# Patient Record
Sex: MALE | Race: WHITE | Employment: OTHER | URBAN - NONMETROPOLITAN AREA
[De-identification: names, ages, dates, MRNs, and addresses within clinical notes are randomized per-mention and may not be internally consistent; named-entity substitution may affect disease eponyms.]

---

## 2017-04-18 ENCOUNTER — HOSPITAL ENCOUNTER (OUTPATIENT)
Dept: CT IMAGING | Age: 62
Discharge: HOME | End: 2017-04-18
Payer: COMMERCIAL

## 2017-04-18 DIAGNOSIS — C65.9 MALIGNANT NEOPLASM OF RENAL PELVIS, UNSPECIFIED LATERALITY (HCC): ICD-10-CM

## 2017-04-18 LAB
GFR NON-AFRICAN AMERICAN: 44
PERFORMED ON: ABNORMAL
POC CREATININE: 1.6 MG/DL (ref 0.3–1.3)
POC SAMPLE TYPE: ABNORMAL

## 2017-04-18 PROCEDURE — 82565 ASSAY OF CREATININE: CPT

## 2017-04-18 PROCEDURE — 6360000004 HC RX CONTRAST MEDICATION: Performed by: INTERNAL MEDICINE

## 2017-04-18 PROCEDURE — 74177 CT ABD & PELVIS W/CONTRAST: CPT

## 2017-04-18 RX ADMIN — IOVERSOL 50 ML: 741 INJECTION INTRA-ARTERIAL; INTRAVENOUS at 09:45

## 2017-11-29 ENCOUNTER — HOSPITAL ENCOUNTER (OUTPATIENT)
Dept: CT IMAGING | Age: 62
Discharge: HOME | End: 2017-11-29
Payer: COMMERCIAL

## 2017-11-29 DIAGNOSIS — C65.9 MALIGNANT NEOPLASM OF RENAL PELVIS, UNSPECIFIED LATERALITY (HCC): ICD-10-CM

## 2017-11-29 LAB
GFR NON-AFRICAN AMERICAN: 41
PERFORMED ON: ABNORMAL
POC CREATININE: 1.7 MG/DL (ref 0.3–1.3)
POC SAMPLE TYPE: ABNORMAL

## 2017-11-29 PROCEDURE — 71260 CT THORAX DX C+: CPT

## 2017-11-29 PROCEDURE — 6360000004 HC RX CONTRAST MEDICATION: Performed by: INTERNAL MEDICINE

## 2017-11-29 PROCEDURE — 74177 CT ABD & PELVIS W/CONTRAST: CPT

## 2017-11-29 PROCEDURE — 82565 ASSAY OF CREATININE: CPT

## 2017-11-29 RX ADMIN — IOPAMIDOL 50 ML: 755 INJECTION, SOLUTION INTRAVENOUS at 08:24

## 2018-11-26 ENCOUNTER — HOSPITAL ENCOUNTER (OUTPATIENT)
Dept: CT IMAGING | Age: 63
Discharge: HOME | End: 2018-11-26
Payer: MEDICARE

## 2018-11-26 DIAGNOSIS — C65.1 MALIGNANT NEOPLASM OF RIGHT RENAL PELVIS (HCC): ICD-10-CM

## 2018-11-26 LAB
GFR NON-AFRICAN AMERICAN: 47
PERFORMED ON: ABNORMAL
POC CREATININE: 1.5 MG/DL (ref 0.3–1.3)
POC SAMPLE TYPE: ABNORMAL

## 2018-11-26 PROCEDURE — 71260 CT THORAX DX C+: CPT

## 2018-11-26 PROCEDURE — 82565 ASSAY OF CREATININE: CPT

## 2018-11-26 PROCEDURE — 74177 CT ABD & PELVIS W/CONTRAST: CPT

## 2018-11-26 PROCEDURE — 6360000004 HC RX CONTRAST MEDICATION: Performed by: INTERNAL MEDICINE

## 2018-11-26 RX ADMIN — IOPAMIDOL 50 ML: 755 INJECTION, SOLUTION INTRAVENOUS at 09:39

## 2019-05-07 ENCOUNTER — HOSPITAL ENCOUNTER (OUTPATIENT)
Dept: CT IMAGING | Age: 64
Discharge: HOME | End: 2019-05-07
Payer: MEDICARE

## 2019-05-07 DIAGNOSIS — C65.1 MALIGNANT NEOPLASM OF RIGHT RENAL PELVIS (HCC): ICD-10-CM

## 2019-05-07 PROCEDURE — 82565 ASSAY OF CREATININE: CPT

## 2019-05-07 PROCEDURE — 71260 CT THORAX DX C+: CPT

## 2019-05-07 PROCEDURE — 6360000004 HC RX CONTRAST MEDICATION: Performed by: INTERNAL MEDICINE

## 2019-05-07 PROCEDURE — 74177 CT ABD & PELVIS W/CONTRAST: CPT

## 2019-05-07 RX ADMIN — IOPAMIDOL 60 ML: 755 INJECTION, SOLUTION INTRAVENOUS at 09:44

## 2019-08-02 ENCOUNTER — HOSPITAL ENCOUNTER (OUTPATIENT)
Dept: INFUSION THERAPY | Age: 64
Discharge: HOME | End: 2019-08-02
Payer: MEDICARE

## 2019-08-20 ENCOUNTER — HOSPITAL ENCOUNTER (OUTPATIENT)
Dept: VASCULAR LAB | Age: 64
Discharge: HOME | End: 2019-08-20
Payer: MEDICARE

## 2019-08-20 DIAGNOSIS — I65.23 BILATERAL CAROTID ARTERY STENOSIS: Primary | ICD-10-CM

## 2019-08-20 PROCEDURE — 93880 EXTRACRANIAL BILAT STUDY: CPT

## 2019-11-11 ENCOUNTER — OFFICE VISIT (OUTPATIENT)
Dept: HEMATOLOGY | Age: 64
End: 2019-11-11
Payer: MEDICARE

## 2019-11-11 ENCOUNTER — HOSPITAL ENCOUNTER (OUTPATIENT)
Dept: INFUSION THERAPY | Age: 64
Discharge: HOME | End: 2019-11-11
Payer: MEDICARE

## 2019-11-11 VITALS
WEIGHT: 192.7 LBS | HEART RATE: 74 BPM | HEIGHT: 67 IN | SYSTOLIC BLOOD PRESSURE: 150 MMHG | BODY MASS INDEX: 30.25 KG/M2 | OXYGEN SATURATION: 96 % | DIASTOLIC BLOOD PRESSURE: 88 MMHG

## 2019-11-11 DIAGNOSIS — I87.8 POOR VENOUS ACCESS: Primary | ICD-10-CM

## 2019-11-11 DIAGNOSIS — C68.9 UROTHELIAL CANCER (HCC): ICD-10-CM

## 2019-11-11 DIAGNOSIS — C68.9 UROTHELIAL CANCER (HCC): Primary | ICD-10-CM

## 2019-11-11 DIAGNOSIS — Z08 ENCOUNTER FOR FOLLOW-UP SURVEILLANCE OF UROTHELIAL CARCINOMA OF UPPER URINARY TRACT: ICD-10-CM

## 2019-11-11 DIAGNOSIS — Z85.50 ENCOUNTER FOR FOLLOW-UP SURVEILLANCE OF UROTHELIAL CARCINOMA OF UPPER URINARY TRACT: ICD-10-CM

## 2019-11-11 PROCEDURE — G8599 NO ASA/ANTIPLAT THER USE RNG: HCPCS | Performed by: INTERNAL MEDICINE

## 2019-11-11 PROCEDURE — 4004F PT TOBACCO SCREEN RCVD TLK: CPT | Performed by: INTERNAL MEDICINE

## 2019-11-11 PROCEDURE — 85025 COMPLETE CBC W/AUTO DIFF WBC: CPT

## 2019-11-11 PROCEDURE — 4040F PNEUMOC VAC/ADMIN/RCVD: CPT | Performed by: INTERNAL MEDICINE

## 2019-11-11 PROCEDURE — 1123F ACP DISCUSS/DSCN MKR DOCD: CPT | Performed by: INTERNAL MEDICINE

## 2019-11-11 PROCEDURE — G8427 DOCREV CUR MEDS BY ELIG CLIN: HCPCS | Performed by: INTERNAL MEDICINE

## 2019-11-11 PROCEDURE — 2580000003 HC RX 258: Performed by: NURSE PRACTITIONER

## 2019-11-11 PROCEDURE — G8417 CALC BMI ABV UP PARAM F/U: HCPCS | Performed by: INTERNAL MEDICINE

## 2019-11-11 PROCEDURE — G8484 FLU IMMUNIZE NO ADMIN: HCPCS | Performed by: INTERNAL MEDICINE

## 2019-11-11 PROCEDURE — 6360000002 HC RX W HCPCS: Performed by: NURSE PRACTITIONER

## 2019-11-11 PROCEDURE — 3017F COLORECTAL CA SCREEN DOC REV: CPT | Performed by: INTERNAL MEDICINE

## 2019-11-11 PROCEDURE — 99213 OFFICE O/P EST LOW 20 MIN: CPT | Performed by: INTERNAL MEDICINE

## 2019-11-11 PROCEDURE — 96523 IRRIG DRUG DELIVERY DEVICE: CPT

## 2019-11-11 RX ORDER — HEPARIN SODIUM (PORCINE) LOCK FLUSH IV SOLN 100 UNIT/ML 100 UNIT/ML
500 SOLUTION INTRAVENOUS PRN
Status: DISCONTINUED | OUTPATIENT
Start: 2019-11-11 | End: 2019-11-12 | Stop reason: HOSPADM

## 2019-11-11 RX ORDER — HEPARIN SODIUM (PORCINE) LOCK FLUSH IV SOLN 100 UNIT/ML 100 UNIT/ML
500 SOLUTION INTRAVENOUS PRN
Status: CANCELLED | OUTPATIENT
Start: 2019-11-11

## 2019-11-11 RX ORDER — SODIUM CHLORIDE 0.9 % (FLUSH) 0.9 %
10 SYRINGE (ML) INJECTION PRN
Status: CANCELLED | OUTPATIENT
Start: 2019-11-11

## 2019-11-11 RX ORDER — SODIUM CHLORIDE 0.9 % (FLUSH) 0.9 %
20 SYRINGE (ML) INJECTION PRN
Status: DISCONTINUED | OUTPATIENT
Start: 2019-11-11 | End: 2019-11-12 | Stop reason: HOSPADM

## 2019-11-11 RX ORDER — ANTIOX #8/OM3/DHA/EPA/LUT/ZEAX 250-2.5 MG
CAPSULE ORAL 2 TIMES DAILY
COMMUNITY

## 2019-11-11 RX ORDER — SODIUM CHLORIDE 0.9 % (FLUSH) 0.9 %
20 SYRINGE (ML) INJECTION PRN
Status: CANCELLED | OUTPATIENT
Start: 2019-11-11

## 2019-11-11 RX ADMIN — Medication 20 ML: at 11:17

## 2019-11-11 RX ADMIN — Medication 500 UNITS: at 11:17

## 2020-02-04 VITALS
DIASTOLIC BLOOD PRESSURE: 80 MMHG | HEIGHT: 68 IN | BODY MASS INDEX: 28.95 KG/M2 | WEIGHT: 191 LBS | SYSTOLIC BLOOD PRESSURE: 134 MMHG | HEART RATE: 78 BPM

## 2020-02-04 RX ORDER — LIDOCAINE AND PRILOCAINE 25; 25 MG/G; MG/G
CREAM TOPICAL PRN
COMMUNITY
End: 2020-06-26 | Stop reason: SDUPTHER

## 2020-02-06 ENCOUNTER — HOSPITAL ENCOUNTER (OUTPATIENT)
Dept: INFUSION THERAPY | Age: 65
Discharge: HOME | End: 2020-02-06
Payer: MEDICARE

## 2020-02-06 DIAGNOSIS — I87.8 POOR VENOUS ACCESS: Primary | ICD-10-CM

## 2020-02-06 PROCEDURE — 6360000002 HC RX W HCPCS: Performed by: INTERNAL MEDICINE

## 2020-02-06 PROCEDURE — 2580000003 HC RX 258: Performed by: NURSE PRACTITIONER

## 2020-02-06 PROCEDURE — 96523 IRRIG DRUG DELIVERY DEVICE: CPT

## 2020-02-06 RX ORDER — SODIUM CHLORIDE 0.9 % (FLUSH) 0.9 %
20 SYRINGE (ML) INJECTION PRN
Status: CANCELLED | OUTPATIENT
Start: 2020-02-06

## 2020-02-06 RX ORDER — SODIUM CHLORIDE 0.9 % (FLUSH) 0.9 %
10 SYRINGE (ML) INJECTION PRN
Status: CANCELLED | OUTPATIENT
Start: 2020-02-06

## 2020-02-06 RX ORDER — SODIUM CHLORIDE 0.9 % (FLUSH) 0.9 %
20 SYRINGE (ML) INJECTION PRN
Status: DISCONTINUED | OUTPATIENT
Start: 2020-02-06 | End: 2020-02-07 | Stop reason: HOSPADM

## 2020-02-06 RX ORDER — HEPARIN SODIUM (PORCINE) LOCK FLUSH IV SOLN 100 UNIT/ML 100 UNIT/ML
300 SOLUTION INTRAVENOUS PRN
Status: DISCONTINUED | OUTPATIENT
Start: 2020-02-06 | End: 2020-02-07 | Stop reason: HOSPADM

## 2020-02-06 RX ORDER — HEPARIN SODIUM (PORCINE) LOCK FLUSH IV SOLN 100 UNIT/ML 100 UNIT/ML
500 SOLUTION INTRAVENOUS PRN
Status: CANCELLED | OUTPATIENT
Start: 2020-02-06

## 2020-02-06 RX ADMIN — HEPARIN 300 UNITS: 100 SYRINGE at 13:40

## 2020-02-06 RX ADMIN — SODIUM CHLORIDE, PRESERVATIVE FREE 20 ML: 5 INJECTION INTRAVENOUS at 13:40

## 2020-03-30 ENCOUNTER — HOSPITAL ENCOUNTER (OUTPATIENT)
Dept: CT IMAGING | Age: 65
Discharge: HOME | End: 2020-03-30
Payer: MEDICARE

## 2020-03-30 LAB
GFR NON-AFRICAN AMERICAN: 38
PERFORMED ON: ABNORMAL
POC CREATININE: 1.8 MG/DL (ref 0.3–1.3)
POC SAMPLE TYPE: ABNORMAL

## 2020-03-30 PROCEDURE — 74177 CT ABD & PELVIS W/CONTRAST: CPT

## 2020-03-30 PROCEDURE — 6360000004 HC RX CONTRAST MEDICATION: Performed by: INTERNAL MEDICINE

## 2020-03-30 PROCEDURE — 82565 ASSAY OF CREATININE: CPT

## 2020-03-30 PROCEDURE — 71260 CT THORAX DX C+: CPT

## 2020-03-30 RX ADMIN — IOPAMIDOL 75 ML: 755 INJECTION, SOLUTION INTRAVENOUS at 08:36

## 2020-04-03 ENCOUNTER — HOSPITAL ENCOUNTER (OUTPATIENT)
Dept: NUCLEAR MEDICINE | Age: 65
Discharge: HOME | End: 2020-04-05
Payer: MEDICARE

## 2020-04-03 LAB
GLUCOSE BLD-MCNC: 130 MG/DL (ref 70–99)
PERFORMED ON: ABNORMAL

## 2020-04-03 PROCEDURE — 82947 ASSAY GLUCOSE BLOOD QUANT: CPT

## 2020-04-03 PROCEDURE — 3430000000 HC RX DIAGNOSTIC RADIOPHARMACEUTICAL: Performed by: INTERNAL MEDICINE

## 2020-04-03 PROCEDURE — 78815 PET IMAGE W/CT SKULL-THIGH: CPT

## 2020-04-03 PROCEDURE — A9552 F18 FDG: HCPCS | Performed by: INTERNAL MEDICINE

## 2020-04-03 RX ORDER — FLUDEOXYGLUCOSE F 18 200 MCI/ML
10 INJECTION, SOLUTION INTRAVENOUS
Status: COMPLETED | OUTPATIENT
Start: 2020-04-03 | End: 2020-04-03

## 2020-04-03 RX ADMIN — FLUDEOXYGLUCOSE F 18 10 MILLICURIE: 200 INJECTION, SOLUTION INTRAVENOUS at 09:41

## 2020-04-03 NOTE — PROGRESS NOTES
Giuliano Duque   1955 4/6/2020     Chief Complaint   Patient presents with    Follow-up     Urothelial cancer        INTERVAL HISTORY/HISTORY OF PRESENT ILLNESS:  Diagnosis   High-grade urothelial carcinoma of the left kidney stage IV, 2010. Recurrence July 2013   Second recurrence October 2015  Treatment summary  1/5/2010-right radical nephrectomy   Relapse February 2013-retroperitoneal soft tissue mass. Gemcitabine/cisplatin completed July 2013 with good partial response, followed by RT 5040 cGy retroperitoneal residual disease, completed October 2013 October 2015- chest wall recurrence, treated with RT   Currently RADHIKA    Interval history  Mr Lexa Guerrero is a pleasant 75years old male with a history of urothelial carcinoma of the right renal pelvis. He was found to have an abnormal surveillance CT scan of the abdomen and therefore a PET scan was requested. The patient denies any abdominal symptoms. He denies any weight loss. He has a good appetite. He is here to discuss the results of his scans and further treatment decision. He has quit smoking more than 1 year ago. Weight has been stable. He denies any hematuria. Cancer history-High-grade Urothelial carcinoma left renal pelvis  Mr Lexa Guerrero was seen in initial oncology consultation on 2/2/2017 as a referral by Dr. Catalina Finnegan office to establish continuity of care of his history of urothelial carcinoma. 7/1/20090119-[id number] right renal cyst measuring 2.9 x 3.4 x 2.8 cm.   12/4/20092713-[id number] biopsy of a right kidney mass was consistent with poorly differentiated adenocarcinoma. 2/24/2010-the patient was evaluated by Dr. Aster Gamboa for a right renal tumor. 1/5/2010- He underwent a right radical nephrectomy with the findings of a poorly differentiated adenocarcinoma involving the mid pole of the right kidney measures 6.5 cm in greatest dimension.  The tumor extended beyond the renal capsule into the perinephric tissues, but did not extend beyond a course of RT to the retroperitoneal area receiving a total dose of 5040 cGy to the periaortic aortic lymph nodes. 11/18/2013-a CT scan of the abdomen pelvis showed slight diminishment in size in the in nodularity within the retrocaval region. 11/20/2015- He was again seen at [facility] and again resection was not recommended. 10/19/2015-a CT scan of the chest/abdomen pelvis showed new area of soft tissue abnormality in the posterior right chest wall. It measured 2.3 cm and was just medial to the right 11th rib. Another 2.7 cm density anterior to the right 12th rib suggest metastatic disease. 11/24/2015- the patient had a biopsy-proven recurrence on the right chest wall compatible with metastatic carcinoma with glandular differentiation, high-grade. Tissue was sent to [facility] and consistent with metastatic poorly differentiated renal cell carcinoma. Treated with radiation therapy only   12/16/2015- abnormal metabolic activity noted between right 11th rib as described on CT scan for October. Consistent metastatic disease.  -------------- Clinical/radiological remission April 2016 --------------  4/7/2016-patient had CT scans of the chest/abdomen/pelvis which did not show any evidence of new disease compatible with complete clinical remission. 6/8/2016- he was last seen by Dr. Tobin Castañeda on this date who planned for surveillance scans in October 2016.   10/07/2016- CT scan of the chest/abdomen and pelvis did not show any evidence of metastatic disease, compatible with ongoing complete clinical remission   2/2/2017- PET/CT scan requested and Insurance denied. 3/16/2017- PET/CT scan requested but declined again. 4/18/2017 CT scan of the abdomen and pelvis-showed a stable 1.6 cm retroperitoneal adenopathy. No new evidence of metastatic disease within the abdomen pelvis.    11/29/2017-CT chest/abdomen/pelvis showed no evidence of metastatic disease, compatible with ongoing complete [place] who requested imaging to be reviewed. CKD stage III-Rickey also has chronic kidney disease stage III. He continues to deny any urinary symptoms to include hematuria. His creatinine is stable at 1.8/GFR 38  Smoke cessation-He has quit and was appraised of his achievement    Plan  Mail CT /PET scan to [facility] today    I have seen, examined and reviewed this patient medication list, appropriate labs and imaging studies. I reviewed relevant medical records and others physicians notes. I discussed the plans of care with the patient. I answered all the questions to the patients satisfaction  (Please note that portions of this note were completed with a voice recognition program. Efforts were made to edit the dictations but occasionally words are mis-transcribed.)  Please note that portions of this note may have been completed with a voice recognition program. Efforts were made to edit the dictations, but occasionally words are missed transcribed. ?      Follow Up:     Return in about 8 weeks (around 6/1/2020) for port flush every 8 weeks, we will call with next Dr. Adrian grewal. [employer] Astudillo am scribing for Liza Feng MD. Electronically signed by Tram Astudillo on 4/6/2020 at 3:46 PM.   I, Dr Jesse Villagran, personally performed the services described in this documentation as scribed by Tram Astudillo MA in my presence and is both accurate and complete. Over 50% of the total visit time of 25 minutes in face to face encounter with the patient, out of which more than 50% of the time was spent in counseling patient or family and coordination of care. Counseling included but was not limited to time spent reviewing labs, imaging studies/ treatment plan and answering questions.

## 2020-04-06 ENCOUNTER — HOSPITAL ENCOUNTER (OUTPATIENT)
Dept: INFUSION THERAPY | Age: 65
Discharge: HOME | End: 2020-04-06
Payer: MEDICARE

## 2020-04-06 ENCOUNTER — OFFICE VISIT (OUTPATIENT)
Dept: HEMATOLOGY | Age: 65
End: 2020-04-06
Payer: MEDICARE

## 2020-04-06 VITALS
OXYGEN SATURATION: 98 % | SYSTOLIC BLOOD PRESSURE: 142 MMHG | WEIGHT: 189.1 LBS | DIASTOLIC BLOOD PRESSURE: 76 MMHG | BODY MASS INDEX: 29.68 KG/M2 | HEIGHT: 67 IN | HEART RATE: 78 BPM | TEMPERATURE: 98.4 F

## 2020-04-06 DIAGNOSIS — C68.9 UROTHELIAL CANCER (HCC): ICD-10-CM

## 2020-04-06 DIAGNOSIS — I87.8 POOR VENOUS ACCESS: Primary | ICD-10-CM

## 2020-04-06 PROCEDURE — G8427 DOCREV CUR MEDS BY ELIG CLIN: HCPCS | Performed by: INTERNAL MEDICINE

## 2020-04-06 PROCEDURE — G8417 CALC BMI ABV UP PARAM F/U: HCPCS | Performed by: INTERNAL MEDICINE

## 2020-04-06 PROCEDURE — 99214 OFFICE O/P EST MOD 30 MIN: CPT | Performed by: INTERNAL MEDICINE

## 2020-04-06 PROCEDURE — 4004F PT TOBACCO SCREEN RCVD TLK: CPT | Performed by: INTERNAL MEDICINE

## 2020-04-06 PROCEDURE — 85025 COMPLETE CBC W/AUTO DIFF WBC: CPT

## 2020-04-06 PROCEDURE — 4040F PNEUMOC VAC/ADMIN/RCVD: CPT | Performed by: INTERNAL MEDICINE

## 2020-04-06 PROCEDURE — 6360000002 HC RX W HCPCS: Performed by: INTERNAL MEDICINE

## 2020-04-06 PROCEDURE — 3017F COLORECTAL CA SCREEN DOC REV: CPT | Performed by: INTERNAL MEDICINE

## 2020-04-06 PROCEDURE — 96523 IRRIG DRUG DELIVERY DEVICE: CPT

## 2020-04-06 PROCEDURE — 1123F ACP DISCUSS/DSCN MKR DOCD: CPT | Performed by: INTERNAL MEDICINE

## 2020-04-06 PROCEDURE — 2580000003 HC RX 258: Performed by: NURSE PRACTITIONER

## 2020-04-06 RX ORDER — HEPARIN SODIUM (PORCINE) LOCK FLUSH IV SOLN 100 UNIT/ML 100 UNIT/ML
300 SOLUTION INTRAVENOUS PRN
Status: DISCONTINUED | OUTPATIENT
Start: 2020-04-06 | End: 2020-04-07 | Stop reason: HOSPADM

## 2020-04-06 RX ORDER — SODIUM CHLORIDE 0.9 % (FLUSH) 0.9 %
20 SYRINGE (ML) INJECTION PRN
Status: CANCELLED | OUTPATIENT
Start: 2020-04-06

## 2020-04-06 RX ORDER — SODIUM CHLORIDE 0.9 % (FLUSH) 0.9 %
10 SYRINGE (ML) INJECTION PRN
Status: DISCONTINUED | OUTPATIENT
Start: 2020-04-06 | End: 2020-04-07 | Stop reason: HOSPADM

## 2020-04-06 RX ORDER — SODIUM CHLORIDE 0.9 % (FLUSH) 0.9 %
10 SYRINGE (ML) INJECTION PRN
Status: CANCELLED | OUTPATIENT
Start: 2020-04-06

## 2020-04-06 RX ORDER — SODIUM CHLORIDE 0.9 % (FLUSH) 0.9 %
20 SYRINGE (ML) INJECTION PRN
Status: DISCONTINUED | OUTPATIENT
Start: 2020-04-06 | End: 2020-04-07 | Stop reason: HOSPADM

## 2020-04-06 RX ORDER — AMLODIPINE BESYLATE 5 MG/1
5 TABLET ORAL 2 TIMES DAILY
Qty: 30 TABLET | Status: SHIPPED | COMMUNITY
Start: 2020-04-06

## 2020-04-06 RX ORDER — HEPARIN SODIUM (PORCINE) LOCK FLUSH IV SOLN 100 UNIT/ML 100 UNIT/ML
300 SOLUTION INTRAVENOUS PRN
Status: CANCELLED | OUTPATIENT
Start: 2020-04-06

## 2020-04-06 RX ADMIN — HEPARIN 300 UNITS: 100 SYRINGE at 09:18

## 2020-04-06 RX ADMIN — SODIUM CHLORIDE, PRESERVATIVE FREE 20 ML: 5 INJECTION INTRAVENOUS at 09:17

## 2020-04-27 NOTE — PROGRESS NOTES
greatest dimension. The tumor extended beyond the renal capsule into the perinephric tissues, but did not extend beyond Gerota's fascia. Margins of resection were negative. Perineural invasion present. No lymph nodes were found in the specimen. No renal vein invasion. Right ureter negative for malignancy. Right adrenal gland negative for malignancy. No lymph nodes identified. Final pathologic staging [id number] stage III. IHC staining performed at [facility] and [facility] showed malignant cells to express PAX-8, p 504s and focal , with negative CK 7 and p63. The case was reviewed by Dr. Mckenna Brewer [facility], where GATA3 was performed and reported as positive in a significant number of cells. Thus, in his consultation report, Dr Michele Escamilla favored urothelial carcinoma over collecting duct carcinoma. 2/24/2010- he was seen by Dr. Valere Goodell and offer a consultation at [facility] for clinical trial, but declined. He was placed on surveillance follow-up with surveillance imaging. He had several follow-up CT scans performed in April 2010, October 2010, January 2011, July 2011, January 2012, August 2012 that were all unremarkable for disease recurrence. ------------------------------ Relapse February 2013--------------------------  2/7/2013-CT scan of the abdomen pelvis revealed retroperitoneal soft tissue density behind the inferior vena cava (2.8 x 2.7 cm) increased in size from prior CT scan on 7/27/2012 02/19/2013- PET/CT scan showed a mass extending along the pericaval lymph node chain from T12-L1 level with SUV 4. Extensive pericaval adenopathy (SUV 5.4). 3/27/2013-he was seen in consultation at [facility]. The retroperitoneal mass could not be resected. He was recommended chemotherapy.    He completed palliative chemotherapy with cisplatin/gemcitabine completed on 7/26/2013.   8/7/2013-interval improvement consistent with a positive response to pelvis. 11/29/2017-CT chest/abdomen/pelvis showed no evidence of metastatic disease, compatible with ongoing complete clinical remission. 11/26/2018-CT chest, abdomen, pelvis unremarkable for recurrent disease. 3/30/2020-Ct Chest with contrast A stable CT scan of the chest. No evidence of a neoplastic/metastatic disease. Severe atheromatous changes of coronary arteries similar to the previous study. No evidence of mediastinal or intrathoracic adenopathy. 3/30/2020-CT abdomen pelvis with contrast showed 21 x 17 mm aortocaval lymph node adjacent to the right nephrectomy bed. 4/3/2020- Pet scan showed metastatic lymphadenopathy in the right paraspinal level at T12 and in the aortocaval region. Maximum SUV is in the aortocaval lymph node and measures 14.2. 2. Indeterminate precarinal lymph node demonstrating a maximum SUV of 4.2. This can be followed with subsequent exams. 4/24/2020- EGD/[facility]US at [facility] and FNA biopsy consistent with recurrent metastatic urothelial carcinoma. IHC positive for PAX-8, PAULY-3 and AE1/AE3. Insufficient cellularity on cellblock for ancillary molecular studies. 4/29/2020-recommended palliative/definitive RT and immunotherapy with pembrolizumab.         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)     Metastatic disease (HCC)     Retinopathy     Urothelial cancer ([facility] [place] [address] 4/29/2013          PAST SURGICAL HISTORY:  Past Surgical History:   Procedure Laterality Date    COLONOSCOPY  2010    Dr Belle Rojas  12/04/2009    LEG SURGERY      PARTIAL NEPHRECTOMY Right 01/05/2012    VASCULAR SURGERY  04- [facility]    us guided cannulation of right internal jugular vein, right internal imaging studies/ treatment plan and answering questions.

## 2020-04-29 ENCOUNTER — TELEPHONE (OUTPATIENT)
Dept: HEMATOLOGY | Age: 65
End: 2020-04-29

## 2020-04-29 ENCOUNTER — TELEPHONE (OUTPATIENT)
Dept: RADIATION ONCOLOGY | Facility: HOSPITAL | Age: 65
End: 2020-04-29

## 2020-04-29 ENCOUNTER — HOSPITAL ENCOUNTER (OUTPATIENT)
Dept: INFUSION THERAPY | Age: 65
Discharge: HOME | End: 2020-04-29
Payer: MEDICARE

## 2020-04-29 ENCOUNTER — OFFICE VISIT (OUTPATIENT)
Dept: HEMATOLOGY | Age: 65
End: 2020-04-29
Payer: MEDICARE

## 2020-04-29 VITALS
WEIGHT: 182.9 LBS | TEMPERATURE: 98.5 F | SYSTOLIC BLOOD PRESSURE: 144 MMHG | HEART RATE: 71 BPM | OXYGEN SATURATION: 98 % | BODY MASS INDEX: 28.71 KG/M2 | DIASTOLIC BLOOD PRESSURE: 86 MMHG | HEIGHT: 67 IN

## 2020-04-29 PROCEDURE — 99213 OFFICE O/P EST LOW 20 MIN: CPT

## 2020-04-29 PROCEDURE — 4040F PNEUMOC VAC/ADMIN/RCVD: CPT | Performed by: INTERNAL MEDICINE

## 2020-04-29 PROCEDURE — 85025 COMPLETE CBC W/AUTO DIFF WBC: CPT

## 2020-04-29 PROCEDURE — 99214 OFFICE O/P EST MOD 30 MIN: CPT | Performed by: INTERNAL MEDICINE

## 2020-04-29 PROCEDURE — 4004F PT TOBACCO SCREEN RCVD TLK: CPT | Performed by: INTERNAL MEDICINE

## 2020-04-29 PROCEDURE — G8417 CALC BMI ABV UP PARAM F/U: HCPCS | Performed by: INTERNAL MEDICINE

## 2020-04-29 PROCEDURE — 3017F COLORECTAL CA SCREEN DOC REV: CPT | Performed by: INTERNAL MEDICINE

## 2020-04-29 PROCEDURE — 1123F ACP DISCUSS/DSCN MKR DOCD: CPT | Performed by: INTERNAL MEDICINE

## 2020-04-29 PROCEDURE — G8427 DOCREV CUR MEDS BY ELIG CLIN: HCPCS | Performed by: INTERNAL MEDICINE

## 2020-05-01 ENCOUNTER — HOSPITAL ENCOUNTER (OUTPATIENT)
Dept: RADIATION ONCOLOGY | Facility: HOSPITAL | Age: 65
Setting detail: RADIATION/ONCOLOGY SERIES
End: 2020-05-01

## 2020-05-03 PROBLEM — Z92.3 HISTORY OF RADIATION THERAPY: Status: ACTIVE | Noted: 2020-05-03

## 2020-05-03 PROBLEM — F17.200 CURRENT EVERY DAY SMOKER: Status: ACTIVE | Noted: 2020-05-03

## 2020-05-03 NOTE — PROGRESS NOTES
[facility] Bibiana Christensen BSN, PA-C  ________________________________________  [facility]  Department of Radiation Oncology  [address] [place] [id number]  Office:  [phone]  Fax: [phone]    DATE:  05/04/2020  PATIENT: Abebe Moncada  1955                         MEDICAL RECORD #:  [id number]                                                       REASON FOR CONSULTATION:  Abebe Moncada is a very pleasant male with a history of urothelial carcinoma of right kidney metastatic to abdominal nodes and now being referred to our office by Ethel Vega MD to discuss radiotherapy recommendations for adenopathy noted on CT scan. Denies activity change, appetite change, unexpected weight change, fatigue, nausea/vomiting, diarrhea, light-headedness, weakness, and headaches. He follows .     History of Present Illness:  Diagnosed in December 2009 with High-grade urothelial carcinoma of the right kidney. Underwent right radical nephrectomy with right adrenal gland on 01/05/2010, pathology revealed poorly differentiated adenocarcinoma involving mid pole of right kidney, 6.5 cm, tumor extends through the renal capsule into perinephric tissues but does not extend beyond Gerota's fascia, ureteral, vascular and parenchymal margins of surgical resection are negative, perineural invasion present, LVI negative. Right adrenal gland, negative for malignancy.  No lymph nodes identified. Final pathologic staging [id number] stage III.   · Recurrence:  02/07/2013 - CT Abdomen/Pelvis revealed a retroperitoneal soft tissue located posterior to IVC appears to be slightly increased, PET Scan was positive for paracaval lymph nodes present extending from T12-L1 level SUV 4.6-5 representing metastatic disease. Unresectable, recommended chemotherapy. Treated with palliative Cisplatin/Gemzar completed July 2013 with good partial response, followed by  RT 5040 cGy retroperitoneal residual disease, completed October 2013  · Recurrence: October 2015-CT scan of the chest showed 2.3 cm soft tissue abnormality in posterior right chest wall medial to right 11th rib and 2.7 cm density anterior to right 12th rib. Biopsy was positive for metastatic carcinoma with glandular differentiation, consistent with metastatic poorly differentiated renal cell carcinoma. Treated with radiation therapy  · Recurrence : 3/30/2020-CT abdomen pelvis showed 21 x 17 mm aortocaval lymph node adjacent to the right nephrectomy bed. Pet scan revealed metastatic lymphadenopathy in the right paraspinal level at T12 and in the aortocaval region. SUV 14.2, indeterminate precarinal lymph node demonstrating a maximum SUV of 4.2. Underwent EGD/EUS at [facility] on 4/24/2020, biopsy was consistent with recurrent metastatic urothelial carcinoma. IHC positive for PAX-8, HEIDE-3 and AE1/AE3. Follows Dr. Vega who recommends palliative/definitive RT and immunotherapy with pembrolizumab.    07/01/2009 - Bilateral Renal Ultrasound:  • Benign appearing right renal cyst measuring 2.9 x 3.4 x 3.8cm    12/04/2009 - Right kidney FNA and biopsy:  Right kidney biopsy:  • Positive for malignancy.  • Consistent with poorly differentiated adenocarcinoma (see outside [facility] report).  FNA kidney:  • Positive for malignancy.  • Consistent with poorly differentiated adenocarcinoma.    01/05/2010 - Radical nephrectomy specimen, right, with right adrenal gland (frozen section control):    • Poorly differentiated adenocarcinoma involving mid pole of right kidney (6.5 cm in greatest dimension).   • The tumor extends through the renal capsule into perinephric tissues but does not extend beyond Gerota's fascia.    • The ureteral, vascular and parenchymal margins of surgical resection are negative for malignancy.    • No renal vein invasion identified.    • Perineural invasion present.    • Chronic pyelonephritis.     • Arteriole-arteriolar nephrosclerosis.    • Right ureter, negative for malignancy.   • Right adrenal gland, negative for malignancy.    • No lymph nodes identified.    Comment: IHC staining performed at [facility] and [facility] [place] showed malignant cells to express PAX-8, p 504s and focal , with negative CK 7 and p63. The case was reviewed by Dr. Yayo Villasenor [facility], where GATA3 was performed and reported as positive in a significant number of cells. Thus, in his consultation report, Dr Bradley favored urothelial carcinoma over collecting duct carcinoma.    02/24/2010- Appointment with Dr. Cade Olvera:  • Offer a consultation at [place] for clinical trial, but declined.   • He was placed on surveillance follow-up with surveillance imaging.     04/28/2010 - CT Abdomen/Pelvis:  • Findings consistent with right nephrectomy and retroperitoneal lymph node dissection with soft tissue surrounding the surgical slips within right renal fossa - possibly scarring, continued close follow-up recommended.  • Stable low density lesions within the right and left hepatic lobes, possibly metastatic, cysts, or hemangiomas - close f/u recommended  • Diffuse bladder wall thickening of uncertain etiology, enlargement of the prostate suggesting underlying bladder outlet obstruction.      10/27/2010 - CT Abdomen/Pelvis:  • Bladder wall thickening stable  • Vague low attenutation defects in the left and right lobe of the liver, more prominent in right    01/27/2011 - CT Abdomen/Pelvis:  • Unremarkable, initially contrast ordered, patient refused    07/27/2011 - CT Abdomen/Pelvis:  • Multiboluated soft issue region within right nephrectomy bed, posterior to the inferior vena cava, stable over prior examinations, likely representing remnant of the renal vein rather than lymphadenopathy - close f/u recommended  • 2 regions within right hepatic lobe stable, benign process favored  • Bladder  wall thickening, chronic obstruction or inflammation     01/27/2012 - CT Abdomen/Pelvis:  • Density posterior to inferior vena cava essentially unchanged  • Hypodense lesions within right lobe of liver, unchanged - hemangiomas?  • Mild thickening of urinary bladder wall - chronic cystitis or mild bladder outlet obstruction    08/03/2012 - CT Abdomen/Pelvis:  • 2 small vague hypodense lesions in liver are stable - likely benign  • Minimal soft tissue posterior to inferior vena cava remains stable  • Continued mild bladder wall thickening     02/07/2013 - CT Abdomen/Pelvis:  • Retroperitoneal soft tissue located posterior to IVC appears to be slightly increased, difficult to accurately measure bc margins are difficult to differentiated from adjacent soft tissues, PET recommended    02/19/2013 - PET Scan:  • Paracaval lymph nodes present extending from T12-L1 level SUV 4.6-5 representing metastatic disease  • SUV 3 in left hip joint - muscular activity or degenerative change    03/22/2013 - Bone Scan:  • Single focus of suspicious uptake in left ilium at upper SI joint level - appears sclerotic  • No abnormal activity in left hip     03/27/2013 - Consultation at [facility]:  • The retroperitoneal mass could not be resected.   • He was recommended chemotherapy.     04/2013 - 07/26/2013 - Chemotherapy course:  • Palliative Cisplatin/Gemzar    05/02/2013 - Mediport placement    07/02/2013 - CT Chest:  • Negative    07/02/2013 - Abdomen/Pelvis:  • Stable hypodense regions in the liver back to 2010 likely benign, stable retroperitoneal lymphadenopathy, diffuse thickening of urinary bladder wall    08/07/2013 - PET Scan:  • Interval decrease in size and number of pericaval nodes indicating response to therapy    09/23/2013 - 10/30/2013 - Completed radiation therapy:  • Received 5040 cGy in 28 fractions to para-aortic lymph nodes via external beam radiation therapy.    02/04/2014 - CT Chest:  • Old granulomatous  disease, otherwise negative chest.   • No interval change from previous exams.    02/04/2014 - CT Abdomen/Pelvis:  • Overall stable appearance of the abdomen and pelvis from a previous study of 11/18/2013. There are two subtle hypodensities within the liver, as described above, as well as some soft tissue fullness in the retrocrural aspect of the right upper retroperitoneum. No new lesions are present. The patient is status post right nephrectomy.  • Atheromatous calcification of the abdominal aorta and iliac arteries with no evidence of aneurysm formation.  • Mild constipation.     06/25/2014 - CT Abdomen/Pelvis:  • No obvious acute pathology in the abdomen or pelvis. Findings in the region of the right diaphragm and liver appear stable.  • Atherosclerotic changes as described.  • Normal appendix.  • Stable left kidney and ureter.  • Prostate enlargement and calcification.    06/25/2014 - Chest x-ray:  • Infusion catheter insertion.  • No active disease seen.    12/10/2014 - CT Abdomen/Pelvis:  • Prior right nephrectomy.   • No significant interval change in the appearance of the abdomen and pelvis when compared to multiple prior exams.   • There is a stable subcentimeter low density in the posterior aspect of the right lobe of the liver.   • There is a stable soft tissuedensity seen posterior to the mandy of the right hemidiaphragm.    12/10/2014 - Chest x-ray:  • Port catheter present, no change from prior exam.     06/18/2015 - CT Abdomen/Pelvis:  • There has been interval development of a prominent interaortocaval node on image 36 of series 0. Although this remains below the threshold for a pathologic node by CT criteria it is more conspicuous than on the previous study and considering the history of renal cell neoplasm, would warrant follow-up on the subsequent CT. There is again some prominence of a retrocrural soft tissue density but that is unchanged from the previous exam. There is also a stable ill-defined  hypodensity within the posterior segment of the right lobe of the liver.  • The left kidney remains normal in appearance with homogeneous enhancement and no evidence of mass. No evidence of nephrolithiasis or obstructive uropathy.  • Heavy atheromatous calcification of the abdominal aorta and iliac arteries with no evidence of aneurysm.   • Mild to moderate constipation.  • Status post right nephrectomy.  • Small hiatal hernia is present.    06/18/2015 - CT Chest:  • 1 cm right paratracheal lymph node more conspicuous compared to the 2/4/2014 examination. Close imaging follow-up suggested.  • Remote granulomatous disease.  • Otherwise negative CT evaluation of the chest.    10/19/2015 - CT Chest:  • No metastatic disease is seen. A 1 cm right paratracheal lymph node is stable.  • Emphysematous appearance of the lungs.  • Please see the abdomen and pelvis CT report separately    10/19/2015 - CT Abdomen/Pelvis:  • New areas of soft tissue abnormality in the posterior right chest wall. One area measures 2.3 cm just medial to the right eleventh rib on image 24 of series 3. There is a 3.7 cm density anterior to the right twelfth rib on image 30 of series 3 and between the right twelfth rib and liver. These areas could be metastatic.  • A lymph node between the abdominal aorta and inferior vena cava may be minimally larger.  • A vague nodular area in the liver measuring less than 1 cm on image 19 of series 3 is stable. A metastatic lesion is possible but this may represent focal fatty infiltration. There is fatty infiltration of the liver.  • Moderate stool in the colon. Atheromatous disease of the aortoiliac vessels. Degenerative changes of the spine.    11/25/2015 - Fine needle core biopsies, posterior right wall chest mass:   • Metastatic carcinoma with glandular differentiation, high-grade.    Comment:  • The tumor is negative for TTF-1, mitigating against a lung primary.  • Immunohistochemical stain for HEIDE-3  performed at [facility] demonstrates foci of weak nuclear positivity.    • Immunohistochemical stain for Mammaglobin performed at [facility] demonstrates weak cytoplasmic positivity.   • An expanded panel of immunohistochemical stains was performed at [facility].    • Immunohistochemical stain for Uroplakin, a marker for transitional cell carcinoma, is negative.  The tumor is positive for PAX-8 and Vimentin.  This profile would favor a renal cell carcinoma although morphologically, this is not a conventional clear cell renal cell carcinoma. Clinical correlation is recommended.  Please refer to the complete pathology report from [facility] which is appended.    12/16/2015 - PET Scan:  • Abnormal metabolic activity is noted between the right 11th and 12th ribs. This appears to be a subtle soft tissue mass described on CT of October. This is consistent with metastatic disease.  • A single right para-aortic lymph node with abnormal metabolic activity. This is improved compared to February 19, 2013.  • A  single left iliac lymph node with abnormal SUV. This is also consistent with metastatic disease. This is unchanged from February 19, 2013  • Abnormal metabolic activity around a right hip prosthesis of undetermined etiology.    12/30/2015 - 02/11/2016 - Completed Radiation course:  • Received 6000 cGy in 30 fractions to right chest via external beam radiation therapy.    04/07/2016 - CT Abdomen/Pelvis with contrast:  • Indeterminate changes within a right 12th rib lesion.  • Decreased size of a retroperitoneal lymph node within the mid to lower abdomen.    04/07/2016 - CT Chest with contrast:  • Lungs remain clear. No new mediastinal or axillary lymphadenopathy is present.  • Slight expansion of the posterior right twelfth rib with a question of a nondisplaced fracture. This could simply be related to a previous injury or may be postoperative in nature with some mild thickening  noted adjacent to this fracture near the nephrectomy bed. Unfortunately, this was not included on the previous outside CT of the chest.    10/07/2016 - CT Chest with contrast:  • Unremarkable and stable CT scan of the chest.   • No evidence of a mediastinal, hilar or parenchymal mass or lymphadenopathy.    10/07/2016 - CT Abdomen/Pelvis with contrast:  • No acute abdominal or pelvic abnormality. No evidence of a neoplastic process/metastatic disease.  • The previously seen aortopulmonic retroperitoneal lymph node is barely visible.  • The deformity of the right 12th rib represents a healing/healed rib fracture. No evidence of bony destruction.  • Evidence of a right nephrectomy.    04/18/2017 - CT Abdomen/pelvis with contrast:  • Stable abdomen/pelvis CT.  • No new or increased abnormality.    11/29/2017 - CT Chest with contrast:  • No evidence of thoracic metastatic disease.    11/29/2017 - CT Abdomen/Pelvis with contrast:  • No evidence of recurrent or metastatic disease in the abdomen or pelvis.    11/26/2018 - CT Chest with contrast:  • No evidence of intrathoracic metastasis.  • Moderate vascular calcification.    11/26/2018 - CT Abdomen/pelvis with contrast:  • No evidence of recurrent or metastatic disease within the abdomen or pelvis. Status post right nephrectomy and likely right adrenalectomy.  • Streak artifact in the pelvis from the right hip prosthesis. Diffuse bladder wall thickening may be due to underdistention. Correlation for cystitis recommended.    05/07/2019 - CT Chest with contrast:  • No intrathoracic abnormality.   • No evidence of mediastinal, hilar or parenchymal mass or lymphadenopathy.   • No evidence of intrathoracic metastatic disease.    05/07/2019 - CT Abdomen/pelvis with contrast:  • No acute abnormality of the abdomen or pelvis.  • The stable tiny low-density nodules in the liver are too small to be further characterized.  • Evidence of right nephrectomy.   • No evidence of  recurrent neoplasm or a metastatic disease.    03/30/2020 - CT Chest with contast:  • A stable CT scan of the chest.   • No evidence of a neoplastic/metastatic disease.  • Severe atheromatous changes of coronary arteries similar to the previous study.    03/30/2020 - CT Abdomen/Pelvis with contrast:  • Stable liver with no new abnormality.  • Normal appearance of the gallbladder.  • No biliary dilation.  • Unchanged appearance of the atrophic pancreas.  • Normal spleen.  • Right nephrectomy.  • Normal left kidney.  • Diffuse aortic calcification with no aneurysm.  • No appendicitis or bowel dilation.  • Moderate fecal material throughout the colon.  • Aortocaval lymphadenopathy just below the level of the left renal artery (axial image 37/103).  • Lymph node = 21 x 17 mm. The lymph node appears larger than this based on the adjacent nonopacified IVC.  • No pelvic mass or fluid.  Impression:  • 21 x 17 mm aortocaval lymph node adjacent to the right nephrectomy bed.    04/03/2020 - PET Scan at [facility]:  • There is a 9 mm precarinal lymph node demonstrates a maximum SUV of 4.2.   • A 1.7 x 1 cm right paraspinal lymph node at the level of T12 demonstrates a maximum SUV of 6.7 and likely represents metastatic disease.   • The previously seen aortocaval lymph node measures 2.7 x 2.1 cm and demonstrates a maximum SUV of 14.2.   Impression:  • Metastatic lymphadenopathy in the right paraspinal level at T12 and in the aortocaval region. Maximum SUV is in the aortocaval lymph node and measures 14.2.  • Indeterminate precarinal lymph node demonstrating a maximum SUV of 4.2. This can be followed with subsequent exams.    04/24/2020 - LYMPH NODE, RETROPERITONEAL, FINE NEEDLE ASPIRATION WITH CELL BLOCK:    • METASTATIC CARCINOMA; FAVOR UROTHELIAL ORIGIN.       Comment:  • The malignant cells are positive for PAX-8, HEIDE-3, and AE1/AE3, a similar immunophenotype to the patient's previously resected renal tumor (see [id number]).    • There is insufficient tumor cellularity on the cell block for ancillary molecular studies.      04/29/2020 - Appointment with :  • Recommended palliative/definitive RT and immunotherapy with pembrolizumab.  • Pembrolizumab regimen:  o 400 mg IV every 6 weeks.  Plan:  • Referral to  , RT  • Start pembrolizumab in 2 weeks.  • RTC treatment room.    History obtained from  PATIENT and CHART    PAST MEDICAL HISTORY  Past Medical History:   Diagnosis Date   • Anxiety    • Anxiety    • BMI 30.0-30.9,adult    • Cancer of kidney (CMS/HCC)    • DM type 1 (diabetes mellitus, type 1) (CMS/HCC)    • History of chemotherapy    • HTN (hypertension)    • Hyperlipidemia    • Hypothyroidism    • Malignant neoplasm of right renal pelvis (CMS/HCC) 10/14/2016   • Metastatic disease (CMS/HCC)    • Proliferative diabetic retinopathy (CMS/HCC)    • Renal cell carcinoma (CMS/HCC)       PAST SURGICAL HISTORY  Past Surgical History:   Procedure Laterality Date   • CHEST WALL BIOPSY Right 11/24/2015   • COLONOSCOPY     • FRACTURE SURGERY Right 08/2015    Leg   • JOINT REPLACEMENT Right 08/2015    Hip   • NEPHRECTOMY RADICAL Right 01/05/2010   • PORTACATH PLACEMENT  05/02/2013    dr. Marroquin      FAMILY HISTORY  family history includes Cancer in his father and mother; No Known Problems in his brother, brother, and sister.     SOCIAL HISTORY  Social History     Tobacco Use   • Smoking status: Former Smoker     Types: Pipe   • Smokeless tobacco: Former User   • Tobacco comment: 1-2 daily, smoked at least 100 cigarettes during lifetime   Substance Use Topics   • Alcohol use: No   • Drug use: No     ALLERGIES  Patient has no known allergies.     MEDICATIONS    Current Outpatient Medications:   •  amLODIPine (NORVASC) 5 MG tablet, Take 5 mg by mouth 2 (Two) Times a Day., Disp: , Rfl:   •  Cetirizine HCl (ZYRTEC ALLERGY) 10 MG capsule, Take 1 capsule by mouth Daily., Disp: , Rfl:   •  ibuprofen (ADVIL,MOTRIN) 200 MG tablet,  "Take 1 tablet by mouth Every 6 (Six) Hours As Needed., Disp: , Rfl:   •  insulin detemir (LEVEMIR) 100 UNIT/ML injection, Inject 50 Units under the skin into the appropriate area as directed 2 (Two) Times a Day., Disp: , Rfl:   •  insulin glulisine (APIDRA) 100 UNIT/ML injection, Inject 5-8 Units under the skin into the appropriate area as directed 3 (Three) Times a Day Before Meals., Disp: , Rfl:   •  INVOKANA 100 MG tablet, Take 1 tablet by mouth Daily., Disp: , Rfl:   •  levothyroxine (SYNTHROID, LEVOTHROID) 125 MCG tablet, Take 125 mcg by mouth Daily., Disp: , Rfl:   •  multivitamins-minerals (PRESERVISION AREDS 2) capsule capsule, Take 1 capsule by mouth 2 (Two) Times a Day., Disp: , Rfl:   •  simvastatin (ZOCOR) 20 MG tablet, Take 1 tablet by mouth Daily., Disp: , Rfl:     The following portions of the patient's history were reviewed and updated as appropriate: allergies, current medications, past family history, past medical history, past social history, past surgical history and problem list.    Current outpatient and discharge medications have been reconciled for the patient.  Reviewed by: Thien Arora III, MD    REVIEW OF SYSTEMS  Review of Systems   Constitutional: Negative for appetite change, fatigue and unexpected weight change.   HENT: Negative.    Eyes:        Glasses  Retinopathy left eye   Respiratory: Negative.    Cardiovascular: Negative.    Gastrointestinal: Negative.    Endocrine: Negative.    Genitourinary: Negative.    Musculoskeletal: Negative.    Skin: Negative.    Allergic/Immunologic: Negative.    Neurological: Negative.    Hematological: Negative.    Psychiatric/Behavioral: Negative.      PHYSICAL EXAM  VITAL SIGNS:   Vitals:    05/04/20 1007   Weight: 82.6 kg (182 lb)   Height: 172.7 cm (68\")   PainSc: 0-No pain     General:  Alert and oriented, no acute distress, well developed, Vitals reviewed.  Head:  Normocephalic, without obvious abnormality    Nose/Sinuses:  Nares normal " externally, no sinus tenderness.  Mouth/Throat:  Mucosa moist, pharynx without erythema  Neck:  supple, No evidence of adenopathy in the cervical or supraclavicular areas.  Eyes: No gross abnormalities   Ears: Ears intact with no external abnormalities noted  Chest:  Respiratory efforts are normal and unlabored, chest is clear to auscultation.  Cardiovascular: Regular rate and rhythm   Abdomen:  Soft, non-tender, normal bowel sounds  Extremities:  BROWN well, warm to touch, no evidence of cyanosis or edema.  Skin: No suspicious lesions or rashes of concern  Neurologic:  Alert and oriented, non focal exam, strength and sensation grossly normal  Psych: Mood and affect are appropriate    Performance Status: ECOG (0) Fully active, able to carry on all predisease performance without restriction    Clinical Quality Measures  Abebe Moncada reports a pain score of 0. Given his pain assessment as noted, treatment options were discussed and the following options were decided upon as a follow-up plan to address the patient's pain: No pain, no plan given.    -Advanced Care Planning Advance Care Planning   ACP discussion was held with the patient during this visit. Patient does not have an advance directive, information provided.    -Body Mass Index Screening and Follow-Up Plan Patient's Body mass index is 27.67 kg/m². BMI is above normal parameters. Recommendations include: educational material.  -Tobacco Use: Screening and Cessation Intervention Social History    Tobacco Use      Smoking status: Former Smoker        Types: Pipe      Smokeless tobacco: Former User      Tobacco comment: 1-2 daily, smoked at least 100 cigarettes during lifetime   Smoking cessation information given in after visit summary.    ASSESSMENT AND PLAN  1. Metastatic urothelial carcinoma (CMS/HCC)    2. Adenopathy    3. History of radiation therapy    4. Current every day smoker      RECOMMENDATIONS: Abebe Moncada has been referred to our office today  to discuss radiotherapy recommendations for recurrent metastatic renal carcinoma.     Diagnosed in December 2009 with poorly differentiated adenocarcinoma of right kidney. Underwent right radical nephrectomy with right adrenal gland on 01/05/2010, pathology revealed poorly differentiated adenocarcinoma involving mid pole of right kidney, 6.5 cm, tumor extends through the renal capsule into perinephric tissues but does not extend beyond Gerota's fascia, ureteral, vascular and parenchymal margins of surgical resection are negative, perineural invasion present, LVI negative. Right adrenal gland, negative for malignancy.  No lymph nodes identified. Final pathologic staging [id number] stage III.   · Recurrence:  02/07/2013 - CT Abdomen/Pelvis revealed a retroperitoneal soft tissue located posterior to IVC appears to be slightly increased, PET Scan was positive for paracaval lymph nodes present extending from T12-L1 level SUV 4.6-5 representing metastatic disease. Unresectable, recommended chemotherapy. Treated with palliative Cisplatin/Gemzar completed July 2013 with good partial response, followed by RT 5040 cGy retroperitoneal residual disease, completed October 2013  · Recurrence: October 2015-CT scan of the chest showed 2.3 cm soft tissue abnormality in posterior right chest wall medial to right 11th rib and 2.7 cm density anterior to right 12th rib. Biopsy was positive for metastatic carcinoma with glandular differentiation, consistent with metastatic poorly differentiated renal cell carcinoma. Treated with radiation therapy  · Recurrence : 3/30/2020-CT abdomen pelvis showed 21 x 17 mm aortocaval lymph node adjacent to the right nephrectomy bed. Pet scan revealed metastatic lymphadenopathy in the right paraspinal level at T12 and in the aortocaval region. SUV 14.2, indeterminate precarinal lymph node demonstrating a maximum SUV of 4.2. Underwent EGD/EUS at [facility] on 4/24/2020, biopsy was consistent with recurrent  metastatic urothelial carcinoma. IHC positive for PAX-8, HEIDE-3 and AE1/AE3. Follows Dr. Vega who recommends palliative/definitive RT and immunotherapy with pembrolizumab.    We have discussed the indications and rationale of radiation therapy according to the NCCN Guidelines. I have extensively reviewed the risks, benefits and alternatives of therapy and progression of disease in spite of therapy with either local or systemic failure.  I have seen, examined and reviewed his medication list, appropriate labs and imaging studies as well as other physician notes. We discussed the goals/plans of care and answered all questions.     After careful consideration of the diagnostic data, review of previous radiation records, 5040 cGy retroperitoneal residual disease completed October 2013 and to right chest wall in 2015,  I would like to proceed with CT simulation, will fuse the images to see if we have additional room for more radiation to the lymphadenopathy. He is also scheduled to see Dr. Jane on the 6th of May in [place] for recommendations.  The patient and his wife verbalize understanding of this discussion and voice no further questions.    Abebe Moncada  reports that he has quit smoking. His smoking use included pipe. He has quit using smokeless tobacco.. I have educated him on the risk of diseases from using tobacco products such as cancer, COPD and heart diease. I advised him to quit and he is not willing to quit. I spent >10 minutes counseling the patient.    Thank you for allowing me to assist in this patients care.    Todays appointment time was spent in counseling and coordination of care as follows: diagnosis, intent of treatment discussing radiation therapy specifics: logistics, possible and probable side effects and after effects, staging of cancer, standard of care in for this stage of this cancer and treatment options  Thien Arora III, MD   05/04/2020

## 2020-05-04 ENCOUNTER — CONSULT (OUTPATIENT)
Dept: RADIATION ONCOLOGY | Facility: HOSPITAL | Age: 65
End: 2020-05-04

## 2020-05-04 ENCOUNTER — TELEPHONE (OUTPATIENT)
Dept: RADIATION ONCOLOGY | Facility: HOSPITAL | Age: 65
End: 2020-05-04

## 2020-05-04 VITALS — WEIGHT: 182 LBS | BODY MASS INDEX: 27.58 KG/M2 | HEIGHT: 68 IN

## 2020-05-04 DIAGNOSIS — C79.10 METASTATIC UROTHELIAL CARCINOMA (HCC): Primary | ICD-10-CM

## 2020-05-04 DIAGNOSIS — F17.200 CURRENT EVERY DAY SMOKER: ICD-10-CM

## 2020-05-04 DIAGNOSIS — Z92.3 HISTORY OF RADIATION THERAPY: ICD-10-CM

## 2020-05-04 DIAGNOSIS — R59.9 ADENOPATHY: ICD-10-CM

## 2020-05-04 PROBLEM — C66.9: Status: ACTIVE | Noted: 2020-05-04

## 2020-05-04 PROBLEM — E11.9 DIABETES MELLITUS (HCC): Status: ACTIVE | Noted: 2020-05-04

## 2020-05-04 PROCEDURE — 77290 THER RAD SIMULAJ FIELD CPLX: CPT | Performed by: RADIOLOGY

## 2020-05-04 PROCEDURE — 77334 RADIATION TREATMENT AID(S): CPT | Performed by: RADIOLOGY

## 2020-05-04 RX ORDER — CANAGLIFLOZIN 100 MG/1
1 TABLET, FILM COATED ORAL DAILY
COMMUNITY
Start: 2020-03-07

## 2020-05-04 RX ORDER — ANTIOX #8/OM3/DHA/EPA/LUT/ZEAX 250-2.5 MG
1 CAPSULE ORAL 2 TIMES DAILY
COMMUNITY

## 2020-05-04 RX ORDER — SIMVASTATIN 20 MG
1 TABLET ORAL DAILY
COMMUNITY
Start: 2020-04-25

## 2020-05-04 RX ORDER — IBUPROFEN 200 MG
1 TABLET ORAL EVERY 6 HOURS PRN
COMMUNITY

## 2020-05-05 PROBLEM — R59.9 ADENOPATHY: Status: ACTIVE | Noted: 2020-05-05

## 2020-05-05 NOTE — TELEPHONE ENCOUNTER
I spoke to this patient is wife this evening on the telephone to indicate that on review of his PET scan I am somewhat suspicious about the right paratracheal lymph node with a SUV of 4.2.  On review of the actual films this does appear somewhat suspicious and a biopsy via bronchoscopy may be indicated.    We will await the review by Dr. Jane at [facility] and consider further evaluation after his review.

## 2020-05-11 ENCOUNTER — TELEPHONE (OUTPATIENT)
Dept: RADIATION ONCOLOGY | Facility: HOSPITAL | Age: 65
End: 2020-05-11

## 2020-05-11 ENCOUNTER — DOCUMENTATION (OUTPATIENT)
Dept: RADIATION ONCOLOGY | Facility: HOSPITAL | Age: 65
End: 2020-05-11

## 2020-05-11 NOTE — PROGRESS NOTES
I have reviewed the fused CT simulation images with previous courses of therapy delivered in 2013 to the para-aortic lymphadenopathy as well as chest wall radiation in 2015.    Due to the previous high dose of radiation to this region I do not believe we can safely deliver any meaningful dose of additional radiation to the para-aortic lymphadenopathy.    I will discuss this with Dr. Vega but I suggest he continues on with immunotherapy.    I am also suspicious about the precarinal lymph node that measured 4.2 SUV activity on PET scan, and this may actually represent additional malignancy.

## 2020-05-11 NOTE — TELEPHONE ENCOUNTER
I spoke with Karol Moncada today and asked about their visit interaction with Dr. Jane in [place].    She reports that Dr. Jane does not have any further intervention to offer and suggested that we continue on with the treatment plan as developed here in [place].    Tentatively he is scheduled to start Keytruda at the end of this week and we will likewise plan to deliver whatever radiotherapy we can for his local progression of disease.    The lymph node in the thorax is indeterminant we will follow that with serial radiographs.

## 2020-05-12 ENCOUNTER — TELEPHONE (OUTPATIENT)
Dept: RADIATION ONCOLOGY | Facility: HOSPITAL | Age: 65
End: 2020-05-12

## 2020-05-12 ENCOUNTER — DOCUMENTATION (OUTPATIENT)
Dept: RADIATION ONCOLOGY | Facility: HOSPITAL | Age: 65
End: 2020-05-12

## 2020-05-12 NOTE — PROGRESS NOTES
I spoke with Dr. Vega this morning and notified him that we are not able to treat the periaortic lymphadenopathy due to the high-dose that he previously received this region in 2013 and 2015.    Dr. Vega expressed understanding and notes that he will proceed on with Keytruda systemic therapy.    If the mediastinal precarinal lymph node is also metastatic lesion the Keytruda should likewise treat that lesion.

## 2020-05-12 NOTE — TELEPHONE ENCOUNTER
I did speak with Dr. Vega this morning and we will defer any radiation to the high dose of radiation as previously received this area.  I relayed this information to his wife this morning by telephone.    At this time the plan is to proceed with Keytruda and no plans for radiation.  The Keytruda should also treat the mediastinal lymph node if that is indeed metastatic disease.

## 2020-05-14 NOTE — PROGRESS NOTES
initial oncology consultation on 2/2/2017 as a referral by Dr. Radha Feldman office to establish continuity of care of his history of urothelial carcinoma. 7/1/[id number]-[id number] right renal cyst measuring 2.9 x 3.4 x 2.8 cm.   12/4/[id number]-EH-ceitet biopsy of a right kidney mass was consistent with poorly differentiated adenocarcinoma. 2/24/2010-the patient was evaluated by Dr. Prince Murphy for a right renal tumor. 1/5/2010- He underwent a right radical nephrectomy with the findings of a poorly differentiated adenocarcinoma involving the mid pole of the right kidney measures 6.5 cm in greatest dimension. The tumor extended beyond the renal capsule into the perinephric tissues, but did not extend beyond Gerota's fascia. Margins of resection were negative. Perineural invasion present. No lymph nodes were found in the specimen. No renal vein invasion. Right ureter negative for malignancy. Right adrenal gland negative for malignancy. No lymph nodes identified. Final pathologic staging [id number] stage III. IHC staining performed at [facility] and [facility] showed malignant cells to express PAX-8, p 504s and focal , with negative CK 7 and p63. The case was reviewed by Dr. Jagdeep Carcamo [facility], where GATA3 was performed and reported as positive in a significant number of cells. Thus, in his consultation report, Dr Arturo Gilliland favored urothelial carcinoma over collecting duct carcinoma. 2/24/2010- he was seen by Dr. Jack Canavan and offer a consultation at [facility] for clinical trial, but declined. He was placed on surveillance follow-up with surveillance imaging. He had several follow-up CT scans performed in April 2010, October 2010, January 2011, July 2011, January 2012, August 2012 that were all unremarkable for disease recurrence.   ------------------------------ Relapse February 2013--------------------------  2/7/2013-CT scan of the abdomen pelvis revealed retroperitoneal soft tissue density behind the inferior vena cava (2.8 x 2.7 cm) increased in size from prior CT scan on 7/27/2012 02/19/2013- PET/CT scan showed a mass extending along the pericaval lymph node chain from T12-L1 level with SUV 4. Extensive pericaval adenopathy (SUV 5.4). 3/27/2013-he was seen in consultation at [facility]. The retroperitoneal mass could not be resected. He was recommended chemotherapy. He completed palliative chemotherapy with cisplatin/gemcitabine completed on 7/26/2013.   8/7/2013-interval improvement consistent with a positive response to chemotherapy. There was a decrease in size and number of the lymph nodes as well as decrease SUV. 10/30/2013-he completed a course of RT to the retroperitoneal area receiving a total dose of 5040 cGy to the periaortic aortic lymph nodes. 11/18/2013-a CT scan of the abdomen pelvis showed slight diminishment in size in the in nodularity within the retrocaval region. 11/20/2015- He was again seen at [facility] and again resection was not recommended. 10/19/2015-a CT scan of the chest/abdomen pelvis showed new area of soft tissue abnormality in the posterior right chest wall. It measured 2.3 cm and was just medial to the right 11th rib. Another 2.7 cm density anterior to the right 12th rib suggest metastatic disease. 11/24/2015- the patient had a biopsy-proven recurrence on the right chest wall compatible with metastatic carcinoma with glandular differentiation, high-grade. [facility] was [facility] and consistent with metastatic poorly differentiated renal cell carcinoma. Treated with radiation therapy only   12/16/2015- abnormal metabolic activity noted between right 11th rib as described on CT scan for October.  Consistent metastatic disease.  -------------- Clinical/radiological remission April 2016 --------------  4/7/2016-patient had CT scans of the chest/abdomen/pelvis which did not show any evidence of new Fear of current or ex partner: Not on file     Emotionally abused: Not on file     Physically abused: Not on file     Forced sexual activity: Not on file   Other Topics Concern    Not on file   Social History Narrative    Not on file       FAMILY HISTORY:  Family History   Problem Relation Age of Onset    Cervical Cancer Mother     Kidney Cancer Father         Current Outpatient Medications   Medication Sig Dispense Refill    amLODIPine (NORVASC) 5 MG tablet Take 1 tablet by mouth 2 times daily 30 tablet     lidocaine-prilocaine (EMLA) 2.5-2.5 % cream Apply topically as needed for Pain Apply topically as needed.  Multiple Vitamins-Minerals (PRESERVISION AREDS 2) CAPS Take by mouth 2 times daily      insulin glulisine (APIDRA) 100 UNIT/ML injection Inject  into the skin nightly.  insulin detemir (LEVEMIR) 100 UNIT/ML injection Inject  into the skin nightly.  levothyroxine (SYNTHROID) 125 MCG tablet Take 125 mcg by mouth Daily.  simvastatin (ZOCOR) 40 MG tablet Take 20 mg by mouth nightly       cetirizine (ZYRTEC) 10 MG tablet Take 10 mg by mouth daily. No current facility-administered medications for this visit. REVIEW OF SYSTEMS:    Constitutional: no fever, no night sweats, no fatigue;   HEENT: no blurring of vision, no double vision, no hearing difficulty, no tinnitus,no ulceration, no dysphagia  Lungs: no cough, no shortness of breath, no wheeze;   CVS: no palpitation, no chest pain, no shortness of breath;  GI: no abdominal pain, no nausea , no vomiting, no constipation;   RE: no dysuria, frequency and urgency, no hematuria, no kidney stones;   Musculoskeletal: no joint pain, swelling , stiffness;   Endocrine: no polyuria, polydypsia, no cold or heat intolerence; Hematology/lymphatic: no easy brusing or bleeding, no hx of clotting disorder; no peripheral adenopathy.   Dermatology: no skin rash, no eczema, no pruritis;   Psychiatry: no depression, no anxiety,no panic attacks, no suicide ideation; Neurology: no syncope, no seizures, no numbness or tingling of hands, no numbness or tingling of feet, no paresis;     PHYSICAL EXAM:    Vitals signs:  BP (!) 140/82   Pulse 78   Temp 98.3 °F (36.8 °C) (Tympanic)   Ht 5' 7\" (1.702 m)   Wt 183 lb 3.2 oz (83.1 kg)   SpO2 98%   BMI 28.69 kg/m²    Pain scale:        CONSTITUTIONAL: Alert, appropriate, no acute distress,   EYES: Non icteric, EOM intact, pupils equal round and reactive to light and accommodation. ENT: Oral mucus membranes moist, no oral pharyngeal lesions. External inspection of ears and nose are normal.   NECK: Supple, no masses. No palpable thyroid mass    CHEST/LUNGS: CTA bilaterally, normal respiratory effort   CARDIOVASCULAR: RRR, no murmurs. No lower extremity edema   ABDOMEN: soft non-tender, active bowel sounds, no hepatosplenomegaly. No palpable masses. EXTREMITIES: warm, Full ROM of all fours extremities. No focal weakness. SKIN: warm, dry with no rashes or lesions  LYMPH: No cervical, clavicular, axillary, or inguinal lymphadenopathy  NEUROLOGIC: follows commands, non focal.   PSYCH: mood and affect appropriate. Alert and oriented to time and place and person. Relevant Lab findings/reviewed by me:  CBC:5/15/2020  WBC-7.06  HGB-14.3  PLT-161,000  Neut-5.11    Relevant Imaging studies/reviewed by me:            ASSESSMENT    Orders Placed This Encounter   Procedures    CBC Auto Differential     Standing Status:   Future     Number of Occurrences:   1     Standing Expiration Date:   5/15/2021    Comprehensive Metabolic Panel     Standing Status:   Future     Number of Occurrences:   1     Standing Expiration Date:   5/15/2021    TSH without Reflex     Standing Status:   Future     Number of Occurrences:   1     Standing Expiration Date:   5/15/2021      Vonda Echols was seen today for cancer.     Diagnoses and all orders for this visit:    Urothelial cancer ([facility] [address]  -     CBC Auto Differential; Future  - Comprehensive Metabolic Panel; Future  -     Cancel: TSH without Reflex; Standing  -     Cancel: TSH without Reflex    Encounter for follow-up surveillance of urothelial carcinoma of upper urinary tract  -     CBC Auto Differential; Future  -     Comprehensive Metabolic Panel; Future  -     Cancel: TSH without Reflex; Standing  -     Cancel: TSH without Reflex    Fatigue, unspecified type  -     CBC Auto Differential; Future  -     Comprehensive Metabolic Panel; Future  -     Cancel: TSH without Reflex; Standing  -     Cancel: TSH without Reflex  -     TSH without Reflex; Future       Urothelial carcinoma upper urinary tract. CT abdomen and PET scan showed likely recurrent disease. Discussed with the patient at length about the findings and plan. Discussed with surgeon on-call at [address] [place] [address] who requested imaging to be reviewed. 4/24/2020- EGD/EUS at [address] [place] [address] and FNA biopsy consistent with recurrent metastatic urothelial carcinoma. IHC positive for PAX-8, PAULY-3 and AE1/AE3. Insufficient cellularity on cellblock for ancillary molecular studies. 4/29/2020-recommended palliative pembrolizumab. Pembrolizumab regimen:  400 mg IV every 6 weeks. We discussed about results of a recent study regarding addition of chemotherapy to his immunotherapy. Will evaluate and assess PDL 1 status. CKD stage III-Rickey also has chronic kidney disease stage III. He continues to deny any urinary symptoms to include hematuria. His creatinine is stable at 1.3/GFR 55  Smoke cessation-He has quit and was appraised of his achievement    Plan  Pembrolizumab in 2 weeks. PDL 1 requested. C treatment room in 6 weeks      I have seen, examined and reviewed this patient medication list, appropriate labs and imaging studies. I reviewed relevant medical records and others physicians notes. I discussed the plans of care with the patient.  I answered all the questions to the patients satisfaction  (Please note that portions of

## 2020-05-15 ENCOUNTER — HOSPITAL ENCOUNTER (OUTPATIENT)
Dept: INFUSION THERAPY | Age: 65
Discharge: HOME | End: 2020-05-15
Payer: MEDICARE

## 2020-05-15 ENCOUNTER — OFFICE VISIT (OUTPATIENT)
Dept: HEMATOLOGY | Age: 65
End: 2020-05-15
Payer: MEDICARE

## 2020-05-15 VITALS
DIASTOLIC BLOOD PRESSURE: 82 MMHG | HEART RATE: 78 BPM | TEMPERATURE: 98.3 F | WEIGHT: 183.2 LBS | OXYGEN SATURATION: 98 % | SYSTOLIC BLOOD PRESSURE: 140 MMHG | HEIGHT: 67 IN | BODY MASS INDEX: 28.75 KG/M2

## 2020-05-15 DIAGNOSIS — C65.1 MALIGNANT NEOPLASM OF RIGHT RENAL PELVIS (HCC): ICD-10-CM

## 2020-05-15 DIAGNOSIS — Z85.50 ENCOUNTER FOR FOLLOW-UP SURVEILLANCE OF UROTHELIAL CARCINOMA OF UPPER URINARY TRACT: ICD-10-CM

## 2020-05-15 DIAGNOSIS — C68.9 UROTHELIAL CANCER (HCC): Primary | ICD-10-CM

## 2020-05-15 DIAGNOSIS — R53.83 OTHER FATIGUE: ICD-10-CM

## 2020-05-15 DIAGNOSIS — R53.83 FATIGUE, UNSPECIFIED TYPE: ICD-10-CM

## 2020-05-15 DIAGNOSIS — Z08 ENCOUNTER FOR FOLLOW-UP SURVEILLANCE OF UROTHELIAL CARCINOMA OF UPPER URINARY TRACT: ICD-10-CM

## 2020-05-15 LAB
ALBUMIN SERPL-MCNC: 4.6 G/DL (ref 3.5–5.2)
ALP BLD-CCNC: 123 U/L (ref 40–130)
ALT SERPL-CCNC: 16 U/L (ref 21–72)
ANION GAP SERPL CALCULATED.3IONS-SCNC: 14 MMOL/L (ref 7–19)
AST SERPL-CCNC: 31 U/L (ref 17–59)
BASOPHILS ABSOLUTE: 0.03 K/UL (ref 0.01–0.08)
BASOPHILS RELATIVE PERCENT: 0.4 % (ref 0.1–1.2)
BILIRUB SERPL-MCNC: 0.5 MG/DL (ref 0.2–1.3)
BUN BLDV-MCNC: 24 MG/DL (ref 9–20)
CALCIUM SERPL-MCNC: 9.1 MG/DL (ref 8.4–10.2)
CHLORIDE BLD-SCNC: 99 MMOL/L (ref 98–111)
CO2: 22 MMOL/L (ref 22–29)
CREAT SERPL-MCNC: 1.3 MG/DL (ref 0.6–1.2)
EOSINOPHILS ABSOLUTE: 0.24 K/UL (ref 0.04–0.54)
EOSINOPHILS RELATIVE PERCENT: 3.4 % (ref 0.7–7)
GFR NON-AFRICAN AMERICAN: 55
GLUCOSE BLD-MCNC: 219 MG/DL (ref 74–106)
HCT VFR BLD CALC: 40.3 % (ref 40.1–51)
HEMOGLOBIN: 14.3 G/DL (ref 13.7–17.5)
LYMPHOCYTES ABSOLUTE: 1.09 K/UL (ref 1.18–3.74)
LYMPHOCYTES RELATIVE PERCENT: 15.4 % (ref 19.3–53.1)
MCH RBC QN AUTO: 30.6 PG (ref 25.7–32.2)
MCHC RBC AUTO-ENTMCNC: 35.5 G/DL (ref 32.3–36.5)
MCV RBC AUTO: 86.1 FL (ref 79–92.2)
MONOCYTES ABSOLUTE: 0.59 K/UL (ref 0.24–0.82)
MONOCYTES RELATIVE PERCENT: 8.4 % (ref 4.7–12.5)
NEUTROPHILS ABSOLUTE: 5.11 K/UL (ref 1.56–6.13)
NEUTROPHILS RELATIVE PERCENT: 72.4 % (ref 34–71.1)
PDW BLD-RTO: 12.6 % (ref 11.6–14.4)
PLATELET # BLD: 161 K/UL (ref 163–337)
PMV BLD AUTO: 10.1 FL (ref 7.4–10.4)
POTASSIUM SERPL-SCNC: 4.9 MMOL/L (ref 3.5–5.1)
RBC # BLD: 4.68 M/UL (ref 4.63–6.08)
SODIUM BLD-SCNC: 135 MMOL/L (ref 137–145)
TOTAL PROTEIN: 7.6 G/DL (ref 6.3–8.2)
TSH SERPL DL<=0.05 MIU/L-ACNC: 0.09 UIU/ML (ref 0.47–4.68)
WBC # BLD: 7.06 K/UL (ref 4.23–9.07)

## 2020-05-15 PROCEDURE — 6360000002 HC RX W HCPCS: Performed by: INTERNAL MEDICINE

## 2020-05-15 PROCEDURE — 4040F PNEUMOC VAC/ADMIN/RCVD: CPT | Performed by: INTERNAL MEDICINE

## 2020-05-15 PROCEDURE — 1123F ACP DISCUSS/DSCN MKR DOCD: CPT | Performed by: INTERNAL MEDICINE

## 2020-05-15 PROCEDURE — G8427 DOCREV CUR MEDS BY ELIG CLIN: HCPCS | Performed by: INTERNAL MEDICINE

## 2020-05-15 PROCEDURE — 2580000003 HC RX 258: Performed by: INTERNAL MEDICINE

## 2020-05-15 PROCEDURE — 4004F PT TOBACCO SCREEN RCVD TLK: CPT | Performed by: INTERNAL MEDICINE

## 2020-05-15 PROCEDURE — 99214 OFFICE O/P EST MOD 30 MIN: CPT | Performed by: INTERNAL MEDICINE

## 2020-05-15 PROCEDURE — 84443 ASSAY THYROID STIM HORMONE: CPT

## 2020-05-15 PROCEDURE — 80053 COMPREHEN METABOLIC PANEL: CPT

## 2020-05-15 PROCEDURE — 85025 COMPLETE CBC W/AUTO DIFF WBC: CPT

## 2020-05-15 PROCEDURE — G8417 CALC BMI ABV UP PARAM F/U: HCPCS | Performed by: INTERNAL MEDICINE

## 2020-05-15 PROCEDURE — 96413 CHEMO IV INFUSION 1 HR: CPT

## 2020-05-15 PROCEDURE — 6360000002 HC RX W HCPCS

## 2020-05-15 PROCEDURE — 3017F COLORECTAL CA SCREEN DOC REV: CPT | Performed by: INTERNAL MEDICINE

## 2020-05-15 RX ORDER — HEPARIN SODIUM (PORCINE) LOCK FLUSH IV SOLN 100 UNIT/ML 100 UNIT/ML
500 SOLUTION INTRAVENOUS PRN
Status: DISCONTINUED | OUTPATIENT
Start: 2020-05-15 | End: 2020-05-16 | Stop reason: HOSPADM

## 2020-05-15 RX ORDER — METHYLPREDNISOLONE SODIUM SUCCINATE 125 MG/2ML
125 INJECTION, POWDER, LYOPHILIZED, FOR SOLUTION INTRAMUSCULAR; INTRAVENOUS ONCE
Status: CANCELLED | OUTPATIENT
Start: 2020-05-15

## 2020-05-15 RX ORDER — SODIUM CHLORIDE 0.9 % (FLUSH) 0.9 %
5 SYRINGE (ML) INJECTION PRN
Status: CANCELLED | OUTPATIENT
Start: 2020-05-15

## 2020-05-15 RX ORDER — EPINEPHRINE 1 MG/ML
0.3 INJECTION, SOLUTION, CONCENTRATE INTRAVENOUS PRN
Status: CANCELLED | OUTPATIENT
Start: 2020-05-15

## 2020-05-15 RX ORDER — SODIUM CHLORIDE 9 MG/ML
INJECTION, SOLUTION INTRAVENOUS CONTINUOUS
Status: CANCELLED | OUTPATIENT
Start: 2020-05-15

## 2020-05-15 RX ORDER — SODIUM CHLORIDE 0.9 % (FLUSH) 0.9 %
10 SYRINGE (ML) INJECTION PRN
Status: DISCONTINUED | OUTPATIENT
Start: 2020-05-15 | End: 2020-05-16 | Stop reason: HOSPADM

## 2020-05-15 RX ORDER — SODIUM CHLORIDE 9 MG/ML
20 INJECTION, SOLUTION INTRAVENOUS ONCE
Status: CANCELLED | OUTPATIENT
Start: 2020-05-15

## 2020-05-15 RX ORDER — DIPHENHYDRAMINE HYDROCHLORIDE 50 MG/ML
50 INJECTION INTRAMUSCULAR; INTRAVENOUS ONCE
Status: CANCELLED | OUTPATIENT
Start: 2020-05-15

## 2020-05-15 RX ORDER — SODIUM CHLORIDE 0.9 % (FLUSH) 0.9 %
10 SYRINGE (ML) INJECTION PRN
Status: CANCELLED | OUTPATIENT
Start: 2020-05-15

## 2020-05-15 RX ORDER — HEPARIN SODIUM (PORCINE) LOCK FLUSH IV SOLN 100 UNIT/ML 100 UNIT/ML
500 SOLUTION INTRAVENOUS PRN
Status: CANCELLED | OUTPATIENT
Start: 2020-05-15

## 2020-05-15 RX ORDER — MEPERIDINE HYDROCHLORIDE 50 MG/ML
12.5 INJECTION INTRAMUSCULAR; INTRAVENOUS; SUBCUTANEOUS ONCE
Status: CANCELLED | OUTPATIENT
Start: 2020-05-15

## 2020-05-15 RX ADMIN — HEPARIN 500 UNITS: 100 SYRINGE at 12:18

## 2020-05-15 RX ADMIN — SODIUM CHLORIDE, PRESERVATIVE FREE 10 ML: 5 INJECTION INTRAVENOUS at 12:17

## 2020-05-15 RX ADMIN — SODIUM CHLORIDE 400 MG: 900 INJECTION, SOLUTION INTRAVENOUS at 11:45

## 2020-05-19 ENCOUNTER — LAB REQUISITION (OUTPATIENT)
Dept: LAB | Facility: HOSPITAL | Age: 65
End: 2020-05-19

## 2020-05-19 DIAGNOSIS — Z00.00 ENCOUNTER FOR GENERAL ADULT MEDICAL EXAMINATION WITHOUT ABNORMAL FINDINGS: ICD-10-CM

## 2020-06-01 ENCOUNTER — HOSPITAL ENCOUNTER (OUTPATIENT)
Dept: RADIATION ONCOLOGY | Facility: HOSPITAL | Age: 65
Setting detail: RADIATION/ONCOLOGY SERIES
End: 2020-06-01

## 2020-06-10 LAB
LAB AP CASE REPORT: NORMAL
PATH REPORT.FINAL DX SPEC: NORMAL

## 2020-06-25 NOTE — PROGRESS NOTES
left renal pelvis  Mr Cassandra Smith was seen in initial oncology consultation on 2/2/2017 as a referral by Dr. Crystal Martínez office to establish continuity of care of his history of urothelial carcinoma. 7/1/20096546-wywhcj-wilpvuluv right renal cyst measuring 2.9 x 3.4 x 2.8 cm.   12/4/20090736-XR-hzpdrb biopsy of a right kidney mass was consistent with poorly differentiated adenocarcinoma. 2/24/2010-the patient was evaluated by Dr. Tobin Castañeda for a right renal tumor. 1/5/2010- He underwent a right radical nephrectomy with the findings of a poorly differentiated adenocarcinoma involving the mid pole of the right kidney measures 6.5 cm in greatest dimension. The tumor extended beyond the renal capsule into the perinephric tissues, but did not extend beyond Gerota's fascia. Margins of resection were negative. Perineural invasion present. No lymph nodes were found in the specimen. No renal vein invasion. Right ureter negative for malignancy. Right adrenal gland negative for malignancy. No lymph nodes identified. Final pathologic staging [id number] stage III. IHC staining performed at [facility] showed malignant cells to express PAX-8, p 504s and focal , with negative CK 7 and p63. The case was reviewed by Dr. Pattie Thomas [facility], where GATA3 was performed and reported as positive in a significant number of cells. Thus, in his consultation report, Dr Dionte Mueller favored urothelial carcinoma over collecting duct carcinoma. 2/24/2010- he was seen by Dr. Dulce Mendoza and offer a consultation at [facility] for clinical trial, but declined. He was placed on surveillance follow-up with surveillance imaging. He had several follow-up CT scans performed in April 2010, October 2010, January 2011, July 2011, January 2012, August 2012 that were all unremarkable for disease recurrence.   ------------------------------ Relapse February 2013--------------------------  2/7/2013-CT scan of the file    Intimate partner violence     Fear of current or ex partner: Not on file     Emotionally abused: Not on file     Physically abused: Not on file     Forced sexual activity: Not on file   Other Topics Concern    Not on file   Social History Narrative    Not on file       FAMILY HISTORY:  Family History   Problem Relation Age of Onset    Cervical Cancer Mother     Kidney Cancer Father         Current Outpatient Medications   Medication Sig Dispense Refill    amLODIPine (NORVASC) 5 MG tablet Take 1 tablet by mouth 2 times daily 30 tablet     lidocaine-prilocaine (EMLA) 2.5-2.5 % cream Apply topically as needed for Pain Apply topically as needed.  Multiple Vitamins-Minerals (PRESERVISION AREDS 2) CAPS Take by mouth 2 times daily      insulin glulisine (APIDRA) 100 UNIT/ML injection Inject  into the skin nightly.  insulin detemir (LEVEMIR) 100 UNIT/ML injection Inject  into the skin nightly.  levothyroxine (SYNTHROID) 125 MCG tablet Take 125 mcg by mouth Daily.  simvastatin (ZOCOR) 40 MG tablet Take 20 mg by mouth nightly       cetirizine (ZYRTEC) 10 MG tablet Take 10 mg by mouth daily. No current facility-administered medications for this visit. REVIEW OF SYSTEMS:    Constitutional: no fever, no night sweats, fatigue;   HEENT: no blurring of vision, no double vision, no hearing difficulty, no tinnitus,no ulceration, no dysphagia  Lungs: no cough, no shortness of breath, no wheeze;   CVS: no palpitation, no chest pain, no shortness of breath;  GI: no abdominal pain, no nausea , no vomiting, no constipation;   RE: no dysuria, frequency and urgency, no hematuria, no kidney stones;   Musculoskeletal: no joint pain, swelling , stiffness;   Endocrine: no polyuria, polydypsia, no cold or heat intolerence; Hematology/lymphatic: no easy brusing or bleeding, no hx of clotting disorder; no peripheral adenopathy.   Dermatology: no skin rash, no eczema, no pruritis;   Psychiatry: no depression, no anxiety,no panic attacks, no suicide ideation; Neurology: no syncope, no seizures, no numbness or tingling of hands, no numbness or tingling of feet, no paresis;     PHYSICAL EXAM:    Vitals signs:  BP (!) 140/78   Pulse 73   Temp 98.3 °F (36.8 °C)   Wt 176 lb 14.4 oz (80.2 kg)   SpO2 98%   BMI 27.71 kg/m²    Pain scale:        CONSTITUTIONAL: Alert, appropriate, no acute distress,   EYES: Non icteric, EOM intact, pupils equal round and reactive to light and accommodation. ENT: Oral mucus membranes moist, no oral pharyngeal lesions. External inspection of ears and nose are normal.   NECK: Supple, no masses. No palpable thyroid mass    CHEST/LUNGS: CTA bilaterally, normal respiratory effort   CARDIOVASCULAR: RRR, no murmurs. No lower extremity edema   ABDOMEN: soft non-tender, active bowel sounds, no hepatosplenomegaly. No palpable masses. EXTREMITIES: warm, Full ROM of all fours extremities. No focal weakness. SKIN: warm, dry with no rashes or lesions  LYMPH: No cervical, clavicular, axillary, or inguinal lymphadenopathy  NEUROLOGIC: follows commands, non focal.   PSYCH: mood and affect appropriate. Alert and oriented to time and place and person. Relevant Lab findings/reviewed by me:  CBC:  WBC-7.4  HGB-13.5  PLT-154  Neut-5.9    5/15/2020  BUN-24  Creatinine-1.3  Alk Phos-123  ALT-16  AST-31  MUE-35  Sodium-135  Glucose-219  TSH-0.089    Relevant Imaging studies/reviewed by me:        ASSESSMENT    No orders of the defined types were placed in this encounter. Rebeca Magaña was seen today for cancer.     Diagnoses and all orders for this visit:    Malignant neoplasm of right renal pelvis ([facility] [address]    Other fatigue     Chemotherapy management, encounter for    Encounter for antineoplastic immunotherapy    Adverse effect of chemotherapy, subsequent encounter    Care plan discussed with patient       Urothelial carcinoma upper urinary tract, PDL-1 100%   CT abdomen and PET scan showed likely

## 2020-06-26 ENCOUNTER — HOSPITAL ENCOUNTER (OUTPATIENT)
Dept: INFUSION THERAPY | Age: 65
Discharge: HOME | End: 2020-06-26
Payer: MEDICARE

## 2020-06-26 ENCOUNTER — OFFICE VISIT (OUTPATIENT)
Dept: HEMATOLOGY | Age: 65
End: 2020-06-26
Payer: MEDICARE

## 2020-06-26 VITALS — HEIGHT: 67 IN | BODY MASS INDEX: 27.71 KG/M2

## 2020-06-26 VITALS
HEART RATE: 73 BPM | OXYGEN SATURATION: 98 % | BODY MASS INDEX: 27.71 KG/M2 | DIASTOLIC BLOOD PRESSURE: 78 MMHG | SYSTOLIC BLOOD PRESSURE: 140 MMHG | WEIGHT: 176.9 LBS | TEMPERATURE: 98.3 F

## 2020-06-26 DIAGNOSIS — C68.9 UROTHELIAL CANCER (HCC): ICD-10-CM

## 2020-06-26 DIAGNOSIS — R53.83 OTHER FATIGUE: ICD-10-CM

## 2020-06-26 DIAGNOSIS — C65.1 MALIGNANT NEOPLASM OF RIGHT RENAL PELVIS (HCC): Primary | ICD-10-CM

## 2020-06-26 LAB
ALBUMIN SERPL-MCNC: 4 G/DL (ref 3.5–5.2)
ALP BLD-CCNC: 110 U/L (ref 40–130)
ALT SERPL-CCNC: 125 U/L (ref 21–72)
ANION GAP SERPL CALCULATED.3IONS-SCNC: 7 MMOL/L (ref 7–19)
AST SERPL-CCNC: 165 U/L (ref 17–59)
BILIRUB SERPL-MCNC: 0.6 MG/DL (ref 0.2–1.3)
BUN BLDV-MCNC: 22 MG/DL (ref 9–20)
CALCIUM SERPL-MCNC: 8.6 MG/DL (ref 8.4–10.2)
CHLORIDE BLD-SCNC: 100 MMOL/L (ref 98–111)
CO2: 27 MMOL/L (ref 22–29)
CREAT SERPL-MCNC: 1.2 MG/DL (ref 0.6–1.2)
GFR NON-AFRICAN AMERICAN: >60
GLOBULIN: 2.8 G/DL
GLUCOSE BLD-MCNC: 244 MG/DL (ref 74–106)
HCT VFR BLD CALC: 38.8 % (ref 40.1–51)
HEMOGLOBIN: 13.5 G/DL (ref 13.7–17.5)
MCH RBC QN AUTO: 30.9 PG (ref 25.7–32.2)
MCHC RBC AUTO-ENTMCNC: 34.8 G/DL (ref 32.3–36.5)
MCV RBC AUTO: 88.8 FL (ref 79–92.2)
PDW BLD-RTO: 12.6 % (ref 11.6–14.4)
PLATELET # BLD: 154 K/UL (ref 163–337)
PMV BLD AUTO: 9.8 FL (ref 7.4–10.4)
POTASSIUM SERPL-SCNC: 4.7 MMOL/L (ref 3.5–5.1)
RBC # BLD: 4.37 M/UL (ref 4.63–6.08)
SODIUM BLD-SCNC: 134 MMOL/L (ref 137–145)
TOTAL PROTEIN: 6.8 G/DL (ref 6.3–8.2)
TSH SERPL DL<=0.05 MIU/L-ACNC: 0.14 UIU/ML (ref 0.47–4.68)
WBC # BLD: 7.4 K/UL (ref 4.23–9.07)

## 2020-06-26 PROCEDURE — 84443 ASSAY THYROID STIM HORMONE: CPT

## 2020-06-26 PROCEDURE — 85027 COMPLETE CBC AUTOMATED: CPT

## 2020-06-26 PROCEDURE — 96413 CHEMO IV INFUSION 1 HR: CPT

## 2020-06-26 PROCEDURE — G8427 DOCREV CUR MEDS BY ELIG CLIN: HCPCS | Performed by: INTERNAL MEDICINE

## 2020-06-26 PROCEDURE — 96417 CHEMO IV INFUS EACH ADDL SEQ: CPT

## 2020-06-26 PROCEDURE — 2580000003 HC RX 258: Performed by: INTERNAL MEDICINE

## 2020-06-26 PROCEDURE — 1123F ACP DISCUSS/DSCN MKR DOCD: CPT | Performed by: INTERNAL MEDICINE

## 2020-06-26 PROCEDURE — 4040F PNEUMOC VAC/ADMIN/RCVD: CPT | Performed by: INTERNAL MEDICINE

## 2020-06-26 PROCEDURE — 96375 TX/PRO/DX INJ NEW DRUG ADDON: CPT

## 2020-06-26 PROCEDURE — G8417 CALC BMI ABV UP PARAM F/U: HCPCS | Performed by: INTERNAL MEDICINE

## 2020-06-26 PROCEDURE — 96367 TX/PROPH/DG ADDL SEQ IV INF: CPT

## 2020-06-26 PROCEDURE — 99214 OFFICE O/P EST MOD 30 MIN: CPT | Performed by: INTERNAL MEDICINE

## 2020-06-26 PROCEDURE — 4004F PT TOBACCO SCREEN RCVD TLK: CPT | Performed by: INTERNAL MEDICINE

## 2020-06-26 PROCEDURE — 96415 CHEMO IV INFUSION ADDL HR: CPT

## 2020-06-26 PROCEDURE — 3017F COLORECTAL CA SCREEN DOC REV: CPT | Performed by: INTERNAL MEDICINE

## 2020-06-26 PROCEDURE — 6360000002 HC RX W HCPCS: Performed by: INTERNAL MEDICINE

## 2020-06-26 PROCEDURE — 80053 COMPREHEN METABOLIC PANEL: CPT

## 2020-06-26 PROCEDURE — 96366 THER/PROPH/DIAG IV INF ADDON: CPT

## 2020-06-26 RX ORDER — PALONOSETRON 0.05 MG/ML
0.25 INJECTION, SOLUTION INTRAVENOUS ONCE
Status: COMPLETED | OUTPATIENT
Start: 2020-06-26 | End: 2020-06-26

## 2020-06-26 RX ORDER — HEPARIN SODIUM (PORCINE) LOCK FLUSH IV SOLN 100 UNIT/ML 100 UNIT/ML
500 SOLUTION INTRAVENOUS PRN
Status: CANCELLED | OUTPATIENT
Start: 2020-06-26

## 2020-06-26 RX ORDER — SODIUM CHLORIDE 9 MG/ML
20 INJECTION, SOLUTION INTRAVENOUS ONCE
Status: CANCELLED | OUTPATIENT
Start: 2020-06-26

## 2020-06-26 RX ORDER — SODIUM CHLORIDE 0.9 % (FLUSH) 0.9 %
10 SYRINGE (ML) INJECTION PRN
Status: DISCONTINUED | OUTPATIENT
Start: 2020-06-26 | End: 2020-06-27 | Stop reason: HOSPADM

## 2020-06-26 RX ORDER — PROMETHAZINE HYDROCHLORIDE 25 MG/1
25 TABLET ORAL EVERY 6 HOURS PRN
Qty: 30 TABLET | Refills: 1 | Status: SHIPPED | OUTPATIENT
Start: 2020-06-26 | End: 2020-07-03

## 2020-06-26 RX ORDER — SODIUM CHLORIDE 0.9 % (FLUSH) 0.9 %
10 SYRINGE (ML) INJECTION PRN
Status: CANCELLED | OUTPATIENT
Start: 2020-06-26

## 2020-06-26 RX ORDER — HEPARIN SODIUM (PORCINE) LOCK FLUSH IV SOLN 100 UNIT/ML 100 UNIT/ML
500 SOLUTION INTRAVENOUS PRN
Status: CANCELLED | OUTPATIENT
Start: 2020-07-03

## 2020-06-26 RX ORDER — SODIUM CHLORIDE 0.9 % (FLUSH) 0.9 %
5 SYRINGE (ML) INJECTION PRN
Status: CANCELLED | OUTPATIENT
Start: 2020-06-26

## 2020-06-26 RX ORDER — HEPARIN SODIUM (PORCINE) LOCK FLUSH IV SOLN 100 UNIT/ML 100 UNIT/ML
500 SOLUTION INTRAVENOUS PRN
Status: DISCONTINUED | OUTPATIENT
Start: 2020-06-26 | End: 2020-06-27 | Stop reason: HOSPADM

## 2020-06-26 RX ORDER — SODIUM CHLORIDE 9 MG/ML
20 INJECTION, SOLUTION INTRAVENOUS ONCE
Status: CANCELLED | OUTPATIENT
Start: 2020-07-03

## 2020-06-26 RX ORDER — PALONOSETRON 0.05 MG/ML
0.25 INJECTION, SOLUTION INTRAVENOUS ONCE
Status: CANCELLED | OUTPATIENT
Start: 2020-06-26

## 2020-06-26 RX ORDER — DIPHENHYDRAMINE HYDROCHLORIDE 50 MG/ML
50 INJECTION INTRAMUSCULAR; INTRAVENOUS ONCE
Status: CANCELLED | OUTPATIENT
Start: 2020-07-03

## 2020-06-26 RX ORDER — DIPHENHYDRAMINE HYDROCHLORIDE 50 MG/ML
50 INJECTION INTRAMUSCULAR; INTRAVENOUS ONCE
Status: CANCELLED | OUTPATIENT
Start: 2020-06-26

## 2020-06-26 RX ORDER — SODIUM CHLORIDE 9 MG/ML
INJECTION, SOLUTION INTRAVENOUS CONTINUOUS
Status: CANCELLED | OUTPATIENT
Start: 2020-06-26

## 2020-06-26 RX ORDER — DEXAMETHASONE SODIUM PHOSPHATE 10 MG/ML
10 INJECTION, SOLUTION INTRAMUSCULAR; INTRAVENOUS ONCE
Status: DISCONTINUED | OUTPATIENT
Start: 2020-06-26 | End: 2020-06-26

## 2020-06-26 RX ORDER — METHYLPREDNISOLONE SODIUM SUCCINATE 125 MG/2ML
125 INJECTION, POWDER, LYOPHILIZED, FOR SOLUTION INTRAMUSCULAR; INTRAVENOUS ONCE
Status: CANCELLED | OUTPATIENT
Start: 2020-07-03

## 2020-06-26 RX ORDER — SODIUM CHLORIDE 0.9 % (FLUSH) 0.9 %
5 SYRINGE (ML) INJECTION PRN
Status: CANCELLED | OUTPATIENT
Start: 2020-07-03

## 2020-06-26 RX ORDER — METHYLPREDNISOLONE SODIUM SUCCINATE 125 MG/2ML
125 INJECTION, POWDER, LYOPHILIZED, FOR SOLUTION INTRAMUSCULAR; INTRAVENOUS ONCE
Status: CANCELLED | OUTPATIENT
Start: 2020-06-26

## 2020-06-26 RX ORDER — LIDOCAINE AND PRILOCAINE 25; 25 MG/G; MG/G
CREAM TOPICAL PRN
Qty: 1 TUBE | Refills: 1 | Status: SHIPPED | OUTPATIENT
Start: 2020-06-26 | End: 2022-04-06 | Stop reason: SDUPTHER

## 2020-06-26 RX ORDER — SODIUM CHLORIDE 9 MG/ML
INJECTION, SOLUTION INTRAVENOUS CONTINUOUS
Status: CANCELLED | OUTPATIENT
Start: 2020-07-03

## 2020-06-26 RX ORDER — EPINEPHRINE 1 MG/ML
0.3 INJECTION, SOLUTION, CONCENTRATE INTRAVENOUS PRN
Status: CANCELLED | OUTPATIENT
Start: 2020-07-03

## 2020-06-26 RX ORDER — EPINEPHRINE 1 MG/ML
0.3 INJECTION, SOLUTION, CONCENTRATE INTRAVENOUS PRN
Status: CANCELLED | OUTPATIENT
Start: 2020-06-26

## 2020-06-26 RX ORDER — SODIUM CHLORIDE 0.9 % (FLUSH) 0.9 %
10 SYRINGE (ML) INJECTION PRN
Status: CANCELLED | OUTPATIENT
Start: 2020-07-03

## 2020-06-26 RX ADMIN — SODIUM CHLORIDE, PRESERVATIVE FREE 10 ML: 5 INJECTION INTRAVENOUS at 11:28

## 2020-06-26 RX ADMIN — GEMCITABINE HYDROCHLORIDE 1980 MG: 1 INJECTION, SOLUTION INTRAVENOUS at 10:22

## 2020-06-26 RX ADMIN — SODIUM CHLORIDE 200 MG: 9 INJECTION, SOLUTION INTRAVENOUS at 09:49

## 2020-06-26 RX ADMIN — PALONOSETRON 0.25 MG: 0.05 INJECTION, SOLUTION INTRAVENOUS at 09:14

## 2020-06-26 RX ADMIN — SODIUM CHLORIDE 150 MG: 900 INJECTION, SOLUTION INTRAVENOUS at 09:18

## 2020-06-26 RX ADMIN — HEPARIN 500 UNITS: 100 SYRINGE at 11:28

## 2020-06-26 RX ADMIN — CARBOPLATIN 372 MG: 10 INJECTION, SOLUTION INTRAVENOUS at 10:53

## 2020-07-01 ENCOUNTER — HOSPITAL ENCOUNTER (OUTPATIENT)
Dept: RADIATION ONCOLOGY | Facility: HOSPITAL | Age: 65
Setting detail: RADIATION/ONCOLOGY SERIES
End: 2020-07-01

## 2020-07-14 NOTE — PROGRESS NOTES
Mike Chen   1955 7/17/2020     Chief Complaint   Patient presents with    Follow-up     malignant neoplasm of right renal pelvis       INTERVAL HISTORY/HISTORY OF PRESENT ILLNESS:  Diagnosis   High-grade urothelial carcinoma of the left kidney stage IV, 2010. Recurrence July 2013   Second recurrence October 2015  Treatment summary  1/5/2010-right radical nephrectomy   Relapse February 2013-retroperitoneal soft tissue mass. Gemcitabine/cisplatin completed July 2013 with good partial response, followed by RT 5040 cGy retroperitoneal residual disease, completed October 2013 October 2015- chest wall recurrence, treated with RT   Palliative pembrolizumab  Refer to radiation    Interval history  The patient is a very pleasant 77years old male who has a history of urothelial carcinoma of the right renal pelvis diagnosed in 2010. He was a stage III at presentation. Unfortunately, he had a recurrence in July 2013 treated with Gemzar/cisplatin with a good partial response and received adjuvant radiation to the retroperitoneal residual disease completed in October 2013. He remained disease-free until October 2015 when he had a chest wall recurrence, biopsy-proven. This was treated with local radiation therapy with a good response. He remained disease-free for another 5 years but unfortunately on follow-up CT scans showed enlarged retroperitoneal lymph nodes. Biopsy at [facility] consistent with recurrent metastatic urothelial carcinoma. The patient was seen by urology/oncology at [facility] and was not deemed a good surgical candidate for resection. He was also seen by radiation oncology and unfortunately he has maximized dose of radiation in the past.  Therefore, the patient was offered palliative treatment with immunotherapy, Keytruda. Carboplatin Gemzar was added with plans to deliver 4 cycles. He tolerated treatment quite well. He had very mild fatigue that lasts for couple of days.   He denies any skin rash, GI symptoms or respiratory symptoms. He denies abdominal pain. He has been eating well. Cancer history-High-grade Urothelial carcinoma left renal pelvis  Mr Hawk Will was seen in initial oncology consultation on 2/2/2017 as a referral by Dr. Lennox Rider office to establish continuity of care of his history of urothelial carcinoma. 7/1/20099850-[id number] right renal cyst measuring 2.9 x 3.4 x 2.8 cm.   12/4/[id number] biopsy of a right kidney mass was consistent with poorly differentiated adenocarcinoma. 2/24/2010-the patient was evaluated by Dr. Rebecca Beckford for a right renal tumor. 1/5/2010- He underwent a right radical nephrectomy with the findings of a poorly differentiated adenocarcinoma involving the mid pole of the right kidney measures 6.5 cm in greatest dimension. The tumor extended beyond the renal capsule into the perinephric tissues, but did not extend beyond Gerota's fascia. Margins of resection were negative. Perineural invasion present. No lymph nodes were found in the specimen. No renal vein invasion. Right ureter negative for malignancy. Right adrenal gland negative for malignancy. No lymph nodes identified. Final pathologic staging [id number] stage III. IHC staining performed at [facility] and [facility] showed malignant cells to express PAX-8, p 504s and focal , with negative CK 7 and p63. The case was reviewed by Dr. Miranda [facility], where GATA3 was performed and reported as positive in a significant number of cells. Thus, in his consultation report, Dr Fatuma Lemons favored urothelial carcinoma over collecting duct carcinoma. 2/24/2010- he was seen by Dr. Edward Henson and offer a consultation at [facility] for clinical trial, but declined. He was placed on surveillance follow-up with surveillance imaging.  He had several follow-up CT scans performed in April 2010, October 2010, January 2011, July 2011, January 2012, August 2012 that were all unremarkable for disease recurrence. ------------------------------ Relapse February 2013--------------------------  2/7/2013-CT scan of the abdomen pelvis revealed retroperitoneal soft tissue density behind the inferior vena cava (2.8 x 2.7 cm) increased in size from prior CT scan on 7/27/2012 02/19/2013- PET/CT scan showed a mass extending along the pericaval lymph node chain from T12-L1 level with SUV 4. Extensive pericaval adenopathy (SUV 5.4). 3/27/2013-he was seen in consultation at [facility]. The retroperitoneal mass could not be resected. He was recommended chemotherapy. He completed palliative chemotherapy with cisplatin/gemcitabine completed on 7/26/2013.   8/7/2013-interval improvement consistent with a positive response to chemotherapy. There was a decrease in size and number of the lymph nodes as well as decrease SUV. 10/30/2013-he completed a course of RT to the retroperitoneal area receiving a total dose of 5040 cGy to the periaortic aortic lymph nodes. 11/18/2013-a CT scan of the abdomen pelvis showed slight diminishment in size in the in nodularity within the retrocaval region. 11/20/2015- He was again seen at [facility] and again resection was not recommended. 10/19/2015-a CT scan of the chest/abdomen pelvis showed new area of soft tissue abnormality in the posterior right chest wall. It measured 2.3 cm and was just medial to the right 11th rib. Another 2.7 cm density anterior to the right 12th rib suggest metastatic disease. 11/24/2015- the patient had a biopsy-proven recurrence on the right chest wall compatible with metastatic carcinoma with glandular differentiation, high-grade. Tissue was sent to [facility] and consistent with metastatic poorly differentiated renal cell carcinoma. Treated with radiation therapy only   12/16/2015- abnormal metabolic activity noted between right 11th rib as described on CT scan for October.  Consistent metastatic disease.  -------------- Clinical/radiological remission April 2016 --------------  4/7/2016-patient had CT scans of the chest/abdomen/pelvis which did not show any evidence of new disease compatible with complete clinical remission. 6/8/2016- he was last seen by Dr. Hessie Gitelman on this date who planned for surveillance scans in October 2016.   10/07/2016- CT scan of the chest/abdomen and pelvis did not show any evidence of metastatic disease, compatible with ongoing complete clinical remission   2/2/2017- PET/CT scan requested and Insurance denied. 3/16/2017- PET/CT scan requested but declined again. 4/18/2017 CT scan of the abdomen and pelvis-showed a stable 1.6 cm retroperitoneal adenopathy. No new evidence of metastatic disease within the abdomen pelvis. 11/29/2017-CT chest/abdomen/pelvis showed no evidence of metastatic disease, compatible with ongoing complete clinical remission. 11/26/2018-CT chest, abdomen, pelvis unremarkable for recurrent disease. 3/30/2020-Ct Chest with contrast A stable CT scan of the chest. No evidence of a neoplastic/metastatic disease. Severe atheromatous changes of coronary arteries similar to the previous study. No evidence of mediastinal or intrathoracic adenopathy. 3/30/2020-CT abdomen pelvis with contrast showed 21 x 17 mm aortocaval lymph node adjacent to the right nephrectomy bed. 4/3/2020- Pet scan showed metastatic lymphadenopathy in the right paraspinal level at T12 and in the aortocaval region. Maximum SUV is in the aortocaval lymph node and measures 14.2. 2. Indeterminate precarinal lymph node demonstrating a maximum SUV of 4.2. This can be followed with subsequent exams. 4/24/2020- EGD[facility] at [facility] and FNA biopsy consistent with recurrent metastatic urothelial carcinoma. IHC positive for PAX-8, PAULY-3 and AE1/AE3. Insufficient cellularity on cellblock for ancillary molecular studies.   4/29/2020-recommended palliative/definitive RT and immunotherapy with pembrolizumab.  5/15/2020-unfortunately not a surgical candidate and not a candidate for palliative or definitive RT. Started on immunotherapy with Eugune Baston only.         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)     Metastatic disease (HCC)     Retinopathy     Urothelial cancer ([facility] [address] 4/29/2013          PAST SURGICAL HISTORY:  Past Surgical History:   Procedure Laterality Date    COLONOSCOPY  2010    Dr Doshi Cassette  12/04/2009    LEG SURGERY      PARTIAL NEPHRECTOMY Right 01/05/2012    VASCULAR SURGERY  04- [facility]    us guided cannulation of right internal jugular vein, right internal jugular vein single lumen port placement [employer]    WRIST SURGERY Right         SOCIAL HISTORY:  Social History     Socioeconomic History    Marital status:      Spouse name: Not on file    Number of children: Not on file    Years of education: Not on file    Highest education level: Not on file   Occupational History    Not on file   Social Needs    Financial resource strain: Not on file    Food insecurity     Worry: Not on file     Inability: Not on file    Transportation needs     Medical: Not on file     Non-medical: Not on file   Tobacco Use    Smoking status: Former Smoker     Types: Cigars    Smokeless tobacco: Current User   Substance and Sexual Activity    Alcohol use: No    Drug use: No    Sexual activity: Not on file   Lifestyle    Physical activity     Days per week: Not on file     Minutes per session: Not on file    Stress: Not on file   Relationships    Social connections     Talks on phone: Not on file     Gets together: Not on file     Attends [employer] service: Not on file     Active member of club or organization: Not on file     Attends meetings of clubs or organizations: Not on file     Relationship status: Not on file    Intimate partner violence     Fear of current or ex partner: Not on file     Emotionally abused: Not on file     Physically abused: Not on file     Forced sexual activity: Not on file   Other Topics Concern    Not on file   Social History Narrative    Not on file       FAMILY HISTORY:  Family History   Problem Relation Age of Onset    Cervical Cancer Mother     Kidney Cancer Father         Current Outpatient Medications   Medication Sig Dispense Refill    canagliflozin (INVOKANA) 100 MG TABS tablet Take 100 mg by mouth every morning (before breakfast)      lidocaine-prilocaine (EMLA) 2.5-2.5 % cream Apply topically as needed for Pain Apply topically as needed. 1 Tube 1    amLODIPine (NORVASC) 5 MG tablet Take 1 tablet by mouth 2 times daily 30 tablet     Multiple Vitamins-Minerals (PRESERVISION AREDS 2) CAPS Take by mouth 2 times daily      insulin glulisine (APIDRA) 100 UNIT/ML injection Inject  into the skin nightly.  insulin detemir (LEVEMIR) 100 UNIT/ML injection Inject  into the skin nightly.  levothyroxine (SYNTHROID) 125 MCG tablet Take 125 mcg by mouth Daily.  simvastatin (ZOCOR) 40 MG tablet Take 20 mg by mouth nightly       cetirizine (ZYRTEC) 10 MG tablet Take 10 mg by mouth daily. No current facility-administered medications for this visit.          REVIEW OF SYSTEMS:    Constitutional: no fever, no night sweats,  fatigue;   HEENT: no blurring of vision, no double vision, no hearing difficulty, no tinnitus,no ulceration, no dysphagia  Lungs: no cough, no shortness of breath, no wheeze;   CVS: no palpitation, no chest pain, no shortness of breath;  GI: no abdominal pain, no nausea , no vomiting, no constipation;   RE: no dysuria, frequency and urgency, no hematuria, no kidney stones; Musculoskeletal: no joint pain, swelling , stiffness;   Endocrine: no polyuria, polydypsia, no cold or heat intolerence; Hematology/lymphatic: no easy brusing or bleeding, no hx of clotting disorder; no peripheral adenopathy. Dermatology: no skin rash, no eczema, no pruritis;   Psychiatry: no depression, no anxiety,no panic attacks, no suicide ideation; Neurology: no syncope, no seizures, no numbness or tingling of hands, no numbness or tingling of feet, no paresis;     PHYSICAL EXAM:    Vitals signs:  BP (!) 150/80   Pulse 75   Temp 98.5 °F (36.9 °C)   Resp 18   Wt 174 lb 14.4 oz (79.3 kg)   SpO2 98%   BMI 27.39 kg/m²    Pain scale:  Pain Score:   0 - No pain     CONSTITUTIONAL: Alert, appropriate, no acute distress,   EYES: Non icteric, EOM intact, pupils equal round and reactive to light and accommodation. ENT: Oral mucus membranes moist, no oral pharyngeal lesions. External inspection of ears and nose are normal.   NECK: Supple, no masses. No palpable thyroid mass    CHEST/LUNGS: CTA bilaterally, normal respiratory effort   CARDIOVASCULAR: RRR, no murmurs. No lower extremity edema   ABDOMEN: soft non-tender, active bowel sounds, no hepatosplenomegaly. No palpable masses. EXTREMITIES: warm, Full ROM of all fours extremities. No focal weakness. SKIN: warm, dry with no rashes or lesions  LYMPH: No cervical, clavicular, axillary, or inguinal lymphadenopathy  NEUROLOGIC: follows commands, non focal.   PSYCH: mood and affect appropriate. Alert and oriented to time and place and person. Relevant Lab findings/reviewed by me:  CBC: 7/17/2020  WBC- 3.42  HGB- 12.8  PLT- 199,000  Neut- 1.97    6/26/2020  Na- 134 (L)  BUN-22 (H)  Creatinine-1.2  GFR- >60  Alk Phos-110  ALT-125 (H)  AST-165 (H)  Glucose-244 (H)  TSH-0.144 (L)    Relevant Imaging studies/reviewed by me:  No results found.       ASSESSMENT    Orders Placed This Encounter   Procedures    CT ABDOMEN PELVIS W IV CONTRAST Additional Contrast? Oral     Standing Status:   Future     Standing Expiration Date:   7/17/2021     Order Specific Question:   Additional Contrast?     Answer:   Oral    Comprehensive Metabolic Panel     Standing Status:   Future     Standing Expiration Date:   7/17/2021      Virginie Patino was seen today for follow-up. Diagnoses and all orders for this visit:    Malignant neoplasm of right renal pelvis ([facility] [address]  -     Comprehensive Metabolic Panel; Future  -     CT ABDOMEN PELVIS W IV CONTRAST Additional Contrast? Oral; Future    Other fatigue     Chemotherapy management, encounter for    Encounter for antineoplastic immunotherapy    Adverse effect of chemotherapy, subsequent encounter       Urothelial carcinoma upper urinary tract, PDL-1 100%   CT abdomen and PET scan showed likely recurrent disease. Discussed with the patient at length about the findings and plan. Discussed with surgeon on-call at [address] [place] [address] who requested imaging to be reviewed. 4/24/2020- EGD/EUS at [address] [place] [address] and FNA biopsy consistent with recurrent metastatic urothelial carcinoma. IHC positive for PAX-8, PAULY-3 and AE1/AE3. Insufficient cellularity on cellblock for ancillary molecular studies. PDL-1 100%  4/29/2020-recommended palliative pembrolizumab. Pembrolizumab regimen:  200 mg IV every 6 weeks. Carboplatin AUC 4  Gemzar 1000mg/m2  X 4 cycles    PDL-1 100%      Proceed cycle #2 carboplatin/Gemzar    At risk thyroid disorder-TSH was suppressed 0.08. Repeat TSH today. CKD stage III-Rickey also has chronic kidney disease stage III. He continues to deny any urinary symptoms to include hematuria. His creatinine is stable at 1.3/GFR 55. Smoke cessation-He has quit and was appraised of his achievement  Physician's attestation/substantial contribution:  I, Dr Jose A Houston, independently performed an evaluation on Millard Dance.  I have reviewed relevant medical information/data to include but not limited to medication list, relevant appropriate labs next visit      I, Linus Moreno am scribing for Katie Jacome MD. Electronically signed by Linus Moreno on 7/17/2020 at 3:46 PM.   I, Dr Evangelista Raygoza, personally performed the services described in this documentation as scribed by Linus Moreno MA in my presence and is both accurate and complete. Over 50% of the total visit time of 25 minutes in face to face encounter with the patient, out of which more than 50% of the time was spent in counseling patient or family and coordination of care. Counseling included but was not limited to time spent reviewing labs, imaging studies/ treatment plan and answering questions.

## 2020-07-17 ENCOUNTER — HOSPITAL ENCOUNTER (OUTPATIENT)
Dept: INFUSION THERAPY | Age: 65
Discharge: HOME | End: 2020-07-17
Payer: MEDICARE

## 2020-07-17 ENCOUNTER — OFFICE VISIT (OUTPATIENT)
Dept: HEMATOLOGY | Age: 65
End: 2020-07-17
Payer: MEDICARE

## 2020-07-17 VITALS
DIASTOLIC BLOOD PRESSURE: 80 MMHG | SYSTOLIC BLOOD PRESSURE: 150 MMHG | WEIGHT: 174.9 LBS | OXYGEN SATURATION: 98 % | RESPIRATION RATE: 18 BRPM | BODY MASS INDEX: 27.39 KG/M2 | TEMPERATURE: 98.5 F | HEART RATE: 75 BPM

## 2020-07-17 DIAGNOSIS — R53.83 OTHER FATIGUE: Primary | ICD-10-CM

## 2020-07-17 DIAGNOSIS — C65.1 MALIGNANT NEOPLASM OF RIGHT RENAL PELVIS (HCC): ICD-10-CM

## 2020-07-17 DIAGNOSIS — C68.9 UROTHELIAL CANCER (HCC): ICD-10-CM

## 2020-07-17 LAB
ALBUMIN SERPL-MCNC: 4.1 G/DL (ref 3.5–5.2)
ALP BLD-CCNC: 123 U/L (ref 40–130)
ALT SERPL-CCNC: 39 U/L (ref 21–72)
ANION GAP SERPL CALCULATED.3IONS-SCNC: 8 MMOL/L (ref 7–19)
AST SERPL-CCNC: 62 U/L (ref 17–59)
BASOPHILS ABSOLUTE: 0.06 K/UL (ref 0.01–0.08)
BASOPHILS RELATIVE PERCENT: 1.8 % (ref 0.1–1.2)
BILIRUB SERPL-MCNC: 0.4 MG/DL (ref 0.2–1.3)
BUN BLDV-MCNC: 19 MG/DL (ref 9–20)
CALCIUM SERPL-MCNC: 9.1 MG/DL (ref 8.4–10.2)
CHLORIDE BLD-SCNC: 102 MMOL/L (ref 98–111)
CO2: 26 MMOL/L (ref 22–29)
CREAT SERPL-MCNC: 1.3 MG/DL (ref 0.6–1.2)
EOSINOPHILS ABSOLUTE: 0.12 K/UL (ref 0.04–0.54)
EOSINOPHILS RELATIVE PERCENT: 3.5 % (ref 0.7–7)
GFR NON-AFRICAN AMERICAN: 55
GLOBULIN: 2.8 G/DL
GLUCOSE BLD-MCNC: 192 MG/DL (ref 74–106)
HCT VFR BLD CALC: 36.5 % (ref 40.1–51)
HEMOGLOBIN: 12.8 G/DL (ref 13.7–17.5)
LYMPHOCYTES ABSOLUTE: 0.72 K/UL (ref 1.18–3.74)
LYMPHOCYTES RELATIVE PERCENT: 21.1 % (ref 19.3–53.1)
MCH RBC QN AUTO: 31.5 PG (ref 25.7–32.2)
MCHC RBC AUTO-ENTMCNC: 35.1 G/DL (ref 32.3–36.5)
MCV RBC AUTO: 89.9 FL (ref 79–92.2)
MONOCYTES ABSOLUTE: 0.55 K/UL (ref 0.24–0.82)
MONOCYTES RELATIVE PERCENT: 16.1 % (ref 4.7–12.5)
NEUTROPHILS ABSOLUTE: 1.97 K/UL (ref 1.56–6.13)
NEUTROPHILS RELATIVE PERCENT: 57.5 % (ref 34–71.1)
PDW BLD-RTO: 13.4 % (ref 11.6–14.4)
PLATELET # BLD: 199 K/UL (ref 163–337)
PMV BLD AUTO: 9.2 FL (ref 7.4–10.4)
POTASSIUM SERPL-SCNC: 4.7 MMOL/L (ref 3.5–5.1)
RBC # BLD: 4.06 M/UL (ref 4.63–6.08)
SODIUM BLD-SCNC: 136 MMOL/L (ref 137–145)
TOTAL PROTEIN: 6.9 G/DL (ref 6.3–8.2)
TSH SERPL DL<=0.05 MIU/L-ACNC: 0.14 UIU/ML (ref 0.47–4.68)
WBC # BLD: 3.42 K/UL (ref 4.23–9.07)

## 2020-07-17 PROCEDURE — 84443 ASSAY THYROID STIM HORMONE: CPT

## 2020-07-17 PROCEDURE — G8417 CALC BMI ABV UP PARAM F/U: HCPCS | Performed by: INTERNAL MEDICINE

## 2020-07-17 PROCEDURE — 4004F PT TOBACCO SCREEN RCVD TLK: CPT | Performed by: INTERNAL MEDICINE

## 2020-07-17 PROCEDURE — 6360000002 HC RX W HCPCS: Performed by: INTERNAL MEDICINE

## 2020-07-17 PROCEDURE — 96413 CHEMO IV INFUSION 1 HR: CPT

## 2020-07-17 PROCEDURE — 96415 CHEMO IV INFUSION ADDL HR: CPT

## 2020-07-17 PROCEDURE — 3017F COLORECTAL CA SCREEN DOC REV: CPT | Performed by: INTERNAL MEDICINE

## 2020-07-17 PROCEDURE — 96417 CHEMO IV INFUS EACH ADDL SEQ: CPT

## 2020-07-17 PROCEDURE — 80053 COMPREHEN METABOLIC PANEL: CPT

## 2020-07-17 PROCEDURE — G8427 DOCREV CUR MEDS BY ELIG CLIN: HCPCS | Performed by: INTERNAL MEDICINE

## 2020-07-17 PROCEDURE — 99214 OFFICE O/P EST MOD 30 MIN: CPT | Performed by: INTERNAL MEDICINE

## 2020-07-17 PROCEDURE — 96367 TX/PROPH/DG ADDL SEQ IV INF: CPT

## 2020-07-17 PROCEDURE — 2580000003 HC RX 258: Performed by: INTERNAL MEDICINE

## 2020-07-17 PROCEDURE — 85025 COMPLETE CBC W/AUTO DIFF WBC: CPT

## 2020-07-17 PROCEDURE — 4040F PNEUMOC VAC/ADMIN/RCVD: CPT | Performed by: INTERNAL MEDICINE

## 2020-07-17 PROCEDURE — 96375 TX/PRO/DX INJ NEW DRUG ADDON: CPT

## 2020-07-17 PROCEDURE — 1123F ACP DISCUSS/DSCN MKR DOCD: CPT | Performed by: INTERNAL MEDICINE

## 2020-07-17 RX ORDER — DIPHENHYDRAMINE HYDROCHLORIDE 50 MG/ML
50 INJECTION INTRAMUSCULAR; INTRAVENOUS ONCE
Status: CANCELLED | OUTPATIENT
Start: 2020-07-24

## 2020-07-17 RX ORDER — HEPARIN SODIUM (PORCINE) LOCK FLUSH IV SOLN 100 UNIT/ML 100 UNIT/ML
500 SOLUTION INTRAVENOUS PRN
Status: CANCELLED | OUTPATIENT
Start: 2020-07-24

## 2020-07-17 RX ORDER — METHYLPREDNISOLONE SODIUM SUCCINATE 125 MG/2ML
125 INJECTION, POWDER, LYOPHILIZED, FOR SOLUTION INTRAMUSCULAR; INTRAVENOUS ONCE
Status: CANCELLED | OUTPATIENT
Start: 2020-07-17

## 2020-07-17 RX ORDER — SODIUM CHLORIDE 9 MG/ML
20 INJECTION, SOLUTION INTRAVENOUS ONCE
Status: CANCELLED | OUTPATIENT
Start: 2020-07-24

## 2020-07-17 RX ORDER — SODIUM CHLORIDE 0.9 % (FLUSH) 0.9 %
10 SYRINGE (ML) INJECTION PRN
Status: CANCELLED | OUTPATIENT
Start: 2020-07-24

## 2020-07-17 RX ORDER — SODIUM CHLORIDE 9 MG/ML
INJECTION, SOLUTION INTRAVENOUS CONTINUOUS
Status: CANCELLED | OUTPATIENT
Start: 2020-07-17

## 2020-07-17 RX ORDER — EPINEPHRINE 1 MG/ML
0.3 INJECTION, SOLUTION, CONCENTRATE INTRAVENOUS PRN
Status: CANCELLED | OUTPATIENT
Start: 2020-07-24

## 2020-07-17 RX ORDER — SODIUM CHLORIDE 0.9 % (FLUSH) 0.9 %
5 SYRINGE (ML) INJECTION PRN
Status: CANCELLED | OUTPATIENT
Start: 2020-07-17

## 2020-07-17 RX ORDER — SODIUM CHLORIDE 0.9 % (FLUSH) 0.9 %
10 SYRINGE (ML) INJECTION PRN
Status: CANCELLED | OUTPATIENT
Start: 2020-07-17

## 2020-07-17 RX ORDER — SODIUM CHLORIDE 0.9 % (FLUSH) 0.9 %
5 SYRINGE (ML) INJECTION PRN
Status: CANCELLED | OUTPATIENT
Start: 2020-07-24

## 2020-07-17 RX ORDER — PALONOSETRON 0.05 MG/ML
0.25 INJECTION, SOLUTION INTRAVENOUS ONCE
Status: COMPLETED | OUTPATIENT
Start: 2020-07-17 | End: 2020-07-17

## 2020-07-17 RX ORDER — HEPARIN SODIUM (PORCINE) LOCK FLUSH IV SOLN 100 UNIT/ML 100 UNIT/ML
500 SOLUTION INTRAVENOUS PRN
Status: DISCONTINUED | OUTPATIENT
Start: 2020-07-17 | End: 2020-07-18 | Stop reason: HOSPADM

## 2020-07-17 RX ORDER — SODIUM CHLORIDE 9 MG/ML
INJECTION, SOLUTION INTRAVENOUS CONTINUOUS
Status: CANCELLED | OUTPATIENT
Start: 2020-07-24

## 2020-07-17 RX ORDER — SODIUM CHLORIDE 9 MG/ML
20 INJECTION, SOLUTION INTRAVENOUS ONCE
Status: CANCELLED | OUTPATIENT
Start: 2020-07-17

## 2020-07-17 RX ORDER — CANAGLIFLOZIN 100 MG/1
100 TABLET, FILM COATED ORAL
COMMUNITY

## 2020-07-17 RX ORDER — PALONOSETRON 0.05 MG/ML
0.25 INJECTION, SOLUTION INTRAVENOUS ONCE
Status: CANCELLED | OUTPATIENT
Start: 2020-07-17

## 2020-07-17 RX ORDER — SODIUM CHLORIDE 0.9 % (FLUSH) 0.9 %
10 SYRINGE (ML) INJECTION PRN
Status: DISCONTINUED | OUTPATIENT
Start: 2020-07-17 | End: 2020-07-18 | Stop reason: HOSPADM

## 2020-07-17 RX ORDER — EPINEPHRINE 1 MG/ML
0.3 INJECTION, SOLUTION, CONCENTRATE INTRAVENOUS PRN
Status: CANCELLED | OUTPATIENT
Start: 2020-07-17

## 2020-07-17 RX ORDER — DIPHENHYDRAMINE HYDROCHLORIDE 50 MG/ML
50 INJECTION INTRAMUSCULAR; INTRAVENOUS ONCE
Status: CANCELLED | OUTPATIENT
Start: 2020-07-17

## 2020-07-17 RX ORDER — HEPARIN SODIUM (PORCINE) LOCK FLUSH IV SOLN 100 UNIT/ML 100 UNIT/ML
500 SOLUTION INTRAVENOUS PRN
Status: CANCELLED | OUTPATIENT
Start: 2020-07-17

## 2020-07-17 RX ORDER — METHYLPREDNISOLONE SODIUM SUCCINATE 125 MG/2ML
125 INJECTION, POWDER, LYOPHILIZED, FOR SOLUTION INTRAMUSCULAR; INTRAVENOUS ONCE
Status: CANCELLED | OUTPATIENT
Start: 2020-07-24

## 2020-07-17 RX ADMIN — CARBOPLATIN 368 MG: 10 INJECTION, SOLUTION INTRAVENOUS at 11:16

## 2020-07-17 RX ADMIN — SODIUM CHLORIDE 200 MG: 9 INJECTION, SOLUTION INTRAVENOUS at 10:47

## 2020-07-17 RX ADMIN — PALONOSETRON 0.25 MG: 0.05 INJECTION, SOLUTION INTRAVENOUS at 10:11

## 2020-07-17 RX ADMIN — Medication 10 ML: at 11:15

## 2020-07-17 RX ADMIN — GEMCITABINE HYDROCHLORIDE 1980 MG: 1 INJECTION, SOLUTION INTRAVENOUS at 11:50

## 2020-07-17 RX ADMIN — HEPARIN 500 UNITS: 100 SYRINGE at 12:25

## 2020-07-17 RX ADMIN — SODIUM CHLORIDE 150 MG: 900 INJECTION, SOLUTION INTRAVENOUS at 10:11

## 2020-07-29 ENCOUNTER — HOSPITAL ENCOUNTER (OUTPATIENT)
Dept: CT IMAGING | Age: 65
Discharge: HOME | End: 2020-07-29
Payer: MEDICARE

## 2020-07-29 PROCEDURE — 74177 CT ABD & PELVIS W/CONTRAST: CPT

## 2020-07-29 PROCEDURE — 6360000004 HC RX CONTRAST MEDICATION: Performed by: INTERNAL MEDICINE

## 2020-07-29 RX ADMIN — IOPAMIDOL 50 ML: 755 INJECTION, SOLUTION INTRAVENOUS at 08:55

## 2020-07-30 NOTE — PROGRESS NOTES
Trevor Mccabe   1955 8/7/2020     Chief Complaint   Patient presents with    Cancer     Urothelial Carcinoma       INTERVAL HISTORY/HISTORY OF PRESENT ILLNESS:  Diagnosis   High-grade urothelial carcinoma of the left kidney stage IV, 2010. Recurrence July 2013   Second recurrence October 2015  Treatment summary  1/5/2010-right radical nephrectomy   Relapse February 2013-retroperitoneal soft tissue mass. Gemcitabine/cisplatin completed July 2013 with good partial response, followed by RT 5040 cGy retroperitoneal residual disease, completed October 2013 October 2015- chest wall recurrence, treated with RT   Palliative pembrolizumab  Refer to radiation    Interval history  The patient is a very pleasant 77years old male who has a history of urothelial carcinoma of the right renal pelvis diagnosed in 2010. He was a stage III at presentation. Unfortunately, he had a recurrence in July 2013 treated with Gemzar/cisplatin with a good partial response and received adjuvant radiation to the retroperitoneal residual disease completed in October 2013. He remained disease-free until October 2015 when he had a chest wall recurrence, biopsy-proven. This was treated with local radiation therapy with a good response. He remained disease-free for another 5 years but unfortunately on follow-up CT scans showed enlarged retroperitoneal lymph nodes. Biopsy at [facility] consistent with recurrent metastatic urothelial carcinoma. The patient was seen by urology/oncology at [facility] and was not deemed a good surgical candidate for resection. He was also seen by radiation oncology and unfortunately he has maximized dose of radiation in the past.  Therefore, the patient was offered palliative treatment with immunotherapy, Keytruda. Carboplatin Gemzar was added with plans to deliver 4 cycles. He very treatment quite well except for fatigue for couple of days.   He has also noticed a significant increase his blood sugar after a premedication with Decadron. Decadron will be discontinued today. Otherwise the patient denies any skin rash, GI symptoms or respiratory symptoms. He denies abdominal pain. He has been eating well and gained 4 pounds since last visit. His appetite has improved. Cancer history-High-grade Urothelial carcinoma left renal pelvis  Mr Claudetta Junker was seen in initial oncology consultation on 2/2/2017 as a referral by Dr. Diego Burns office to establish continuity of care of his history of urothelial carcinoma. 7/1/[id number] right renal cyst measuring 2.9 x 3.4 x 2.8 cm.   12/4/[id number]-xzupea biopsy of a right kidney mass was consistent with poorly differentiated adenocarcinoma. 2/24/2010-the patient was evaluated by Dr. Stacy Hodgkins for a right renal tumor. 1/5/2010- He underwent a right radical nephrectomy with the findings of a poorly differentiated adenocarcinoma involving the mid pole of the right kidney measures 6.5 cm in greatest dimension. The tumor extended beyond the renal capsule into the perinephric tissues, but did not extend beyond Gerota's fascia. Margins of resection were negative. Perineural invasion present. No lymph nodes were found in the specimen. No renal vein invasion. Right ureter negative for malignancy. Right adrenal gland negative for malignancy. No lymph nodes identified. Final pathologic staging [id number] stage III. IHC staining performed at [facility] showed malignant cells to express PAX-8, p 504s and focal , with negative CK 7 and p63. The case was reviewed by Dr. Yuri Arzola [facility], where GATA3 was performed and reported as positive in a significant number of cells. Thus, in his consultation report, Dr Nixon Tijerina favored urothelial carcinoma over collecting duct carcinoma. 2/24/2010- he was seen by Dr. Christi Goodpasture and offer a consultation at [facility] for clinical trial, but declined.    He was placed on surveillance follow-up with surveillance imaging. He had several follow-up CT scans performed in April 2010, October 2010, January 2011, July 2011, January 2012, August 2012 that were all unremarkable for disease recurrence. ------------------------------ Relapse February 2013--------------------------  2/7/2013-CT scan of the abdomen pelvis revealed retroperitoneal soft tissue density behind the inferior vena cava (2.8 x 2.7 cm) increased in size from prior CT scan on 7/27/2012 02/19/2013- PET/CT scan showed a mass extending along the pericaval lymph node chain from T12-L1 level with SUV 4. Extensive pericaval adenopathy (SUV 5.4). 3/27/2013-he was seen in consultation at [facility]. The retroperitoneal mass could not be resected. He was recommended chemotherapy. He completed palliative chemotherapy with cisplatin/gemcitabine completed on 7/26/2013.   8/7/2013-interval improvement consistent with a positive response to chemotherapy. There was a decrease in size and number of the lymph nodes as well as decrease SUV. 10/30/2013-he completed a course of RT to the retroperitoneal area receiving a total dose of 5040 cGy to the periaortic aortic lymph nodes. 11/18/2013-a CT scan of the abdomen pelvis showed slight diminishment in size in the in nodularity within the retrocaval region. 11/20/2015- He was again seen at [facility] and again resection was not recommended. 10/19/2015-a CT scan of the chest/abdomen pelvis showed new area of soft tissue abnormality in the posterior right chest wall. It measured 2.3 cm and was just medial to the right 11th rib. Another 2.7 cm density anterior to the right 12th rib suggest metastatic disease. 11/24/2015- the patient had a biopsy-proven recurrence on the right chest wall compatible with metastatic carcinoma with glandular differentiation, high-grade.  Tissue was sent to [facility] and consistent with metastatic poorly differentiated renal cell carcinoma. Treated with radiation therapy only   12/16/2015- abnormal metabolic activity noted between right 11th rib as described on CT scan for October. Consistent metastatic disease.  -------------- Clinical/radiological remission April 2016 --------------  4/7/2016-patient had CT scans of the chest/abdomen/pelvis which did not show any evidence of new disease compatible with complete clinical remission. 6/8/2016- he was last seen by Dr. John Pennington on this date who planned for surveillance scans in October 2016.   10/07/2016- CT scan of the chest/abdomen and pelvis did not show any evidence of metastatic disease, compatible with ongoing complete clinical remission   2/2/2017- PET/CT scan requested and Insurance denied. 3/16/2017- PET/CT scan requested but declined again. 4/18/2017 CT scan of the abdomen and pelvis-showed a stable 1.6 cm retroperitoneal adenopathy. No new evidence of metastatic disease within the abdomen pelvis. 11/29/2017-CT chest/abdomen/pelvis showed no evidence of metastatic disease, compatible with ongoing complete clinical remission. 11/26/2018-CT chest, abdomen, pelvis unremarkable for recurrent disease. 3/30/2020-Ct Chest with contrast A stable CT scan of the chest. No evidence of a neoplastic/metastatic disease. Severe atheromatous changes of coronary arteries similar to the previous study. No evidence of mediastinal or intrathoracic adenopathy. 3/30/2020-CT abdomen pelvis with contrast showed 21 x 17 mm aortocaval lymph node adjacent to the right nephrectomy bed. 4/3/2020- Pet scan showed metastatic lymphadenopathy in the right paraspinal level at T12 and in the aortocaval region. Maximum SUV is in the aortocaval lymph node and measures 14.2. 2. Indeterminate precarinal lymph node demonstrating a maximum SUV of 4.2. This can be followed with subsequent exams. 4/24/2020- EGD/EUS at [address] [place] [address] and FNA biopsy consistent with recurrent metastatic urothelial carcinoma. IHC positive for PAX-8, PAULY-3 and AE1/AE3. Insufficient cellularity on cellblock for ancillary molecular studies. 4/29/2020-recommended palliative/definitive RT and immunotherapy with pembrolizumab.  5/15/2020-unfortunately not a surgical candidate and not a candidate for palliative or definitive RT. Started on immunotherapy with Aida Barnacle only. 7/29/2020- Ct Abdomen Pelvis W Contrast Interval decrease in size of an aortocaval lymph node near the right nephrectomy bed. Stable hypodense lesion in the right hepatic lobe. Atherosclerotic disease. Fecal stasis.          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)     Metastatic disease (HCC)     Retinopathy     Urothelial cancer ([facility] [address] 4/29/2013          PAST SURGICAL HISTORY:  Past Surgical History:   Procedure Laterality Date    COLONOSCOPY  2010    Dr Felizardo Fleischer  12/04/2009    LEG SURGERY      PARTIAL NEPHRECTOMY Right 01/05/2012    VASCULAR SURGERY  04- [facility]    us guided cannulation of right internal jugular vein, right internal jugular vein single lumen port placement [employer]    WRIST SURGERY Right         SOCIAL HISTORY:  Social History     Socioeconomic History    Marital status:      Spouse name: None    Number of children: None    Years of education: None    Highest education level: None   Occupational History    None   Social Needs    Financial resource strain: None    Food insecurity     Worry: None     Inability: None    Transportation needs     Medical: None     Non-medical: None   Tobacco Use    Smoking status: Former Smoker     Types: Cigars    Smokeless tobacco: Current User   Substance and Sexual Activity    Alcohol use: No    Drug use: No    Sexual activity: None   Lifestyle    Physical activity     Days per week: None     Minutes per session: None    Stress: None   Relationships    Social connections     Talks on phone: None     Gets together: None     Attends Christianity service: None     Active member of club or organization: None     Attends meetings of clubs or organizations: None     Relationship status: None    Intimate partner violence     Fear of current or ex partner: None     Emotionally abused: None     Physically abused: None     Forced sexual activity: None   Other Topics Concern    None   Social History Narrative    None       FAMILY HISTORY:  Family History   Problem Relation Age of Onset    Cervical Cancer Mother     Kidney Cancer Father         Current Outpatient Medications   Medication Sig Dispense Refill    canagliflozin (INVOKANA) 100 MG TABS tablet Take 100 mg by mouth every morning (before breakfast)      lidocaine-prilocaine (EMLA) 2.5-2.5 % cream Apply topically as needed for Pain Apply topically as needed. 1 Tube 1    amLODIPine (NORVASC) 5 MG tablet Take 1 tablet by mouth 2 times daily 30 tablet     Multiple Vitamins-Minerals (PRESERVISION AREDS 2) CAPS Take by mouth 2 times daily      insulin glulisine (APIDRA) 100 UNIT/ML injection Inject  into the skin nightly.  insulin detemir (LEVEMIR) 100 UNIT/ML injection Inject  into the skin nightly.  levothyroxine (SYNTHROID) 125 MCG tablet Take 125 mcg by mouth Daily.  simvastatin (ZOCOR) 40 MG tablet Take 20 mg by mouth nightly       cetirizine (ZYRTEC) 10 MG tablet Take 10 mg by mouth daily. No current facility-administered medications for this visit.       Facility-Administered Medications Ordered in Other Visits   Medication Dose Route Frequency Provider Last Rate Last Dose    fosaprepitant (EMEND) 150 mg in sodium chloride 0.9 % 150 mL IVPB  150 mg Intravenous Once Michael Ansari  mL/hr at active bowel sounds, no hepatosplenomegaly. No palpable masses. EXTREMITIES: warm, Full ROM of all fours extremities. No focal weakness. SKIN: warm, dry with no rashes or lesions  LYMPH: No cervical, clavicular, axillary, or inguinal lymphadenopathy  NEUROLOGIC: follows commands, non focal.   PSYCH: mood and affect appropriate. Alert and oriented to time and place and person. Relevant Lab findings/reviewed by me:  CBC: 8/7/2020  WBC- 5.03  HGB- 12  PLT- 163  Neut- 2.97    7/17/2020  Glucose- 192 (H)  BUN-19  Creatinine- 1.3 (H)  GFR- 55 (L)  Alk Phos-123  ALT- 39  AST- 62 (H)  TSH-0.138 (L)    Relevant Imaging studies/reviewed by me:  7/29/2020- Ct Abdomen Pelvis W Contrast Interval decrease in size of an aortocaval lymph node near the right nephrectomy bed. Stable hypodense lesion in the right hepatic lobe. Atherosclerotic disease. Fecal stasis. ASSESSMENT    No orders of the defined types were placed in this encounter. Ceola Rubinstein was seen today for cancer. Diagnoses and all orders for this visit:    Chemotherapy management, encounter for    Encounter for antineoplastic immunotherapy    Adverse effect of chemotherapy, subsequent encounter    Care plan discussed with patient    Malignant neoplasm of right renal pelvis (HCC)    Chronic kidney disease (CKD), stage III (moderate) (HCC)      Urothelial carcinoma upper urinary tract, PDL-1 100%     Pembrolizumab regimen:  200 mg IV every 6 weeks. Carboplatin AUC 4  Gemzar 1000mg/m2  X 4 cycles    PDL-1 100%      Proceed cycle #3 carboplatin/Gemzar and Keytruda    At risk thyroid disorder-TSH was suppressed 0.138 is adjusted CMV for him today . CKD stage III-Rickey also has chronic kidney disease stage III. He continues to deny any urinary symptoms to include hematuria. His creatinine is stable at 1.3/GFR 55. Smoke cessation-He has quit and was appraised of his achievement    Mr. Kody Swann.   AST 62, ALT 40      Plan  Pembrolizumab q 3 weeks  Carboplatin AUC 4 , Gemzar 1000mg/m2 q 3 weeks. Proceed cycle #3/4  RTC 3 weeks  We will switch to pembrolizumab 400 mg every 6 weeks after completion of chemotherapy  CMP today  TSH during next visit. I have seen, examined and reviewed this patient medication list, appropriate labs and imaging studies. I reviewed relevant medical records and others physicians notes. I discussed the plans of care with the patient. I answered all the questions to the patients satisfaction  (Please note that portions of this note were completed with a voice recognition program. Efforts were made to edit the dictations but occasionally words are mis-transcribed.)  Please note that portions of this note may have been completed with a voice recognition program. Efforts were made to edit the dictations, but occasionally words are missed transcribed. Over 50% of the total visit time of 25 minutes in face to face encounter with the patient, out of which more than 50% of the time was spent in counseling patient or family and coordination of care. Counseling included but was not limited to time spent reviewing labs, imaging studies/ treatment plan and answering questions. ?  Follow Up:     Return in about 3 weeks (around 8/28/2020) for Treatment Visit and see Dr. Terry Guevara in [address] [place] [address]. Shadia Wise same day      I, Woody Johnson, am scribing for Shawanda Mckeon MD. Electronically signed by Woody Johnson on 8/7/2020 at 3:46 PM.   I, Dr Kal Dubon, personally performed the services described in this documentation as scribed by Woody Johnson MA in my presence and is both accurate and complete. Over 50% of the total visit time of 25 minutes in face to face encounter with the patient, out of which more than 50% of the time was spent in counseling patient or family and coordination of care.  Counseling included but was not limited to time spent reviewing labs, imaging studies/ treatment plan and answering questions.

## 2020-08-07 ENCOUNTER — HOSPITAL ENCOUNTER (OUTPATIENT)
Dept: INFUSION THERAPY | Age: 65
Discharge: HOME | End: 2020-08-07
Payer: MEDICARE

## 2020-08-07 ENCOUNTER — OFFICE VISIT (OUTPATIENT)
Dept: HEMATOLOGY | Age: 65
End: 2020-08-07
Payer: MEDICARE

## 2020-08-07 VITALS
BODY MASS INDEX: 27.94 KG/M2 | WEIGHT: 178 LBS | TEMPERATURE: 97.7 F | OXYGEN SATURATION: 98 % | SYSTOLIC BLOOD PRESSURE: 150 MMHG | DIASTOLIC BLOOD PRESSURE: 86 MMHG | HEART RATE: 71 BPM | HEIGHT: 67 IN

## 2020-08-07 DIAGNOSIS — C65.1 MALIGNANT NEOPLASM OF RIGHT RENAL PELVIS (HCC): Primary | ICD-10-CM

## 2020-08-07 DIAGNOSIS — C68.9 UROTHELIAL CANCER (HCC): ICD-10-CM

## 2020-08-07 DIAGNOSIS — R53.83 OTHER FATIGUE: ICD-10-CM

## 2020-08-07 LAB
ALBUMIN SERPL-MCNC: 4.7 G/DL (ref 3.5–5.2)
ALP BLD-CCNC: 106 U/L (ref 40–130)
ALT SERPL-CCNC: 37 U/L (ref 21–72)
ANION GAP SERPL CALCULATED.3IONS-SCNC: 9 MMOL/L (ref 7–19)
AST SERPL-CCNC: 62 U/L (ref 17–59)
BASOPHILS ABSOLUTE: 0.05 K/UL (ref 0.01–0.08)
BASOPHILS RELATIVE PERCENT: 1 % (ref 0.1–1.2)
BILIRUB SERPL-MCNC: 0.4 MG/DL (ref 0.2–1.3)
BUN BLDV-MCNC: 21 MG/DL (ref 9–20)
CALCIUM SERPL-MCNC: 8.7 MG/DL (ref 8.4–10.2)
CHLORIDE BLD-SCNC: 101 MMOL/L (ref 98–111)
CO2: 26 MMOL/L (ref 22–29)
CREAT SERPL-MCNC: 1.3 MG/DL (ref 0.6–1.2)
EOSINOPHILS ABSOLUTE: 0.16 K/UL (ref 0.04–0.54)
EOSINOPHILS RELATIVE PERCENT: 3.2 % (ref 0.7–7)
GFR NON-AFRICAN AMERICAN: 55
GLOBULIN: 3.1 G/DL
GLUCOSE BLD-MCNC: 289 MG/DL (ref 74–106)
HCT VFR BLD CALC: 33.8 % (ref 40.1–51)
HEMOGLOBIN: 12 G/DL (ref 13.7–17.5)
LYMPHOCYTES ABSOLUTE: 0.79 K/UL (ref 1.18–3.74)
LYMPHOCYTES RELATIVE PERCENT: 15.7 % (ref 19.3–53.1)
MCH RBC QN AUTO: 32.4 PG (ref 25.7–32.2)
MCHC RBC AUTO-ENTMCNC: 35.5 G/DL (ref 32.3–36.5)
MCV RBC AUTO: 91.4 FL (ref 79–92.2)
MONOCYTES ABSOLUTE: 1.06 K/UL (ref 0.24–0.82)
MONOCYTES RELATIVE PERCENT: 21.1 % (ref 4.7–12.5)
NEUTROPHILS ABSOLUTE: 2.97 K/UL (ref 1.56–6.13)
NEUTROPHILS RELATIVE PERCENT: 59 % (ref 34–71.1)
PDW BLD-RTO: 14.7 % (ref 11.6–14.4)
PLATELET # BLD: 163 K/UL (ref 163–337)
PMV BLD AUTO: 9.7 FL (ref 7.4–10.4)
POTASSIUM SERPL-SCNC: 4.7 MMOL/L (ref 3.5–5.1)
RBC # BLD: 3.7 M/UL (ref 4.63–6.08)
SODIUM BLD-SCNC: 136 MMOL/L (ref 137–145)
TOTAL PROTEIN: 7.8 G/DL (ref 6.3–8.2)
TSH SERPL DL<=0.05 MIU/L-ACNC: 0.85 UIU/ML (ref 0.47–4.68)
WBC # BLD: 5.03 K/UL (ref 4.23–9.07)

## 2020-08-07 PROCEDURE — 6360000002 HC RX W HCPCS: Performed by: INTERNAL MEDICINE

## 2020-08-07 PROCEDURE — G8417 CALC BMI ABV UP PARAM F/U: HCPCS | Performed by: INTERNAL MEDICINE

## 2020-08-07 PROCEDURE — 1123F ACP DISCUSS/DSCN MKR DOCD: CPT | Performed by: INTERNAL MEDICINE

## 2020-08-07 PROCEDURE — 4040F PNEUMOC VAC/ADMIN/RCVD: CPT | Performed by: INTERNAL MEDICINE

## 2020-08-07 PROCEDURE — 99214 OFFICE O/P EST MOD 30 MIN: CPT | Performed by: INTERNAL MEDICINE

## 2020-08-07 PROCEDURE — 84443 ASSAY THYROID STIM HORMONE: CPT

## 2020-08-07 PROCEDURE — 96367 TX/PROPH/DG ADDL SEQ IV INF: CPT

## 2020-08-07 PROCEDURE — 96375 TX/PRO/DX INJ NEW DRUG ADDON: CPT

## 2020-08-07 PROCEDURE — 96417 CHEMO IV INFUS EACH ADDL SEQ: CPT

## 2020-08-07 PROCEDURE — G8427 DOCREV CUR MEDS BY ELIG CLIN: HCPCS | Performed by: INTERNAL MEDICINE

## 2020-08-07 PROCEDURE — 96413 CHEMO IV INFUSION 1 HR: CPT

## 2020-08-07 PROCEDURE — 85025 COMPLETE CBC W/AUTO DIFF WBC: CPT

## 2020-08-07 PROCEDURE — 4004F PT TOBACCO SCREEN RCVD TLK: CPT | Performed by: INTERNAL MEDICINE

## 2020-08-07 PROCEDURE — 36415 COLL VENOUS BLD VENIPUNCTURE: CPT

## 2020-08-07 PROCEDURE — 3017F COLORECTAL CA SCREEN DOC REV: CPT | Performed by: INTERNAL MEDICINE

## 2020-08-07 PROCEDURE — 80053 COMPREHEN METABOLIC PANEL: CPT

## 2020-08-07 PROCEDURE — 2580000003 HC RX 258: Performed by: INTERNAL MEDICINE

## 2020-08-07 RX ORDER — SODIUM CHLORIDE 0.9 % (FLUSH) 0.9 %
10 SYRINGE (ML) INJECTION PRN
Status: DISCONTINUED | OUTPATIENT
Start: 2020-08-07 | End: 2020-08-08 | Stop reason: HOSPADM

## 2020-08-07 RX ORDER — EPINEPHRINE 1 MG/ML
0.3 INJECTION, SOLUTION, CONCENTRATE INTRAVENOUS PRN
Status: CANCELLED | OUTPATIENT
Start: 2020-08-14

## 2020-08-07 RX ORDER — DIPHENHYDRAMINE HYDROCHLORIDE 50 MG/ML
50 INJECTION INTRAMUSCULAR; INTRAVENOUS ONCE
Status: CANCELLED | OUTPATIENT
Start: 2020-08-07

## 2020-08-07 RX ORDER — PALONOSETRON 0.05 MG/ML
0.25 INJECTION, SOLUTION INTRAVENOUS ONCE
Status: COMPLETED | OUTPATIENT
Start: 2020-08-07 | End: 2020-08-07

## 2020-08-07 RX ORDER — HEPARIN SODIUM (PORCINE) LOCK FLUSH IV SOLN 100 UNIT/ML 100 UNIT/ML
500 SOLUTION INTRAVENOUS PRN
Status: DISCONTINUED | OUTPATIENT
Start: 2020-08-07 | End: 2020-08-08 | Stop reason: HOSPADM

## 2020-08-07 RX ORDER — SODIUM CHLORIDE 9 MG/ML
20 INJECTION, SOLUTION INTRAVENOUS ONCE
Status: CANCELLED | OUTPATIENT
Start: 2020-08-14

## 2020-08-07 RX ORDER — SODIUM CHLORIDE 9 MG/ML
INJECTION, SOLUTION INTRAVENOUS CONTINUOUS
Status: CANCELLED | OUTPATIENT
Start: 2020-08-14

## 2020-08-07 RX ORDER — HEPARIN SODIUM (PORCINE) LOCK FLUSH IV SOLN 100 UNIT/ML 100 UNIT/ML
500 SOLUTION INTRAVENOUS PRN
Status: CANCELLED | OUTPATIENT
Start: 2020-08-14

## 2020-08-07 RX ORDER — SODIUM CHLORIDE 9 MG/ML
INJECTION, SOLUTION INTRAVENOUS CONTINUOUS
Status: CANCELLED | OUTPATIENT
Start: 2020-08-07

## 2020-08-07 RX ORDER — DEXAMETHASONE SODIUM PHOSPHATE 10 MG/ML
8 INJECTION, SOLUTION INTRAMUSCULAR; INTRAVENOUS ONCE
Status: CANCELLED | OUTPATIENT
Start: 2020-08-14

## 2020-08-07 RX ORDER — METHYLPREDNISOLONE SODIUM SUCCINATE 125 MG/2ML
125 INJECTION, POWDER, LYOPHILIZED, FOR SOLUTION INTRAMUSCULAR; INTRAVENOUS ONCE
Status: CANCELLED | OUTPATIENT
Start: 2020-08-07

## 2020-08-07 RX ORDER — SODIUM CHLORIDE 0.9 % (FLUSH) 0.9 %
5 SYRINGE (ML) INJECTION PRN
Status: CANCELLED | OUTPATIENT
Start: 2020-08-07

## 2020-08-07 RX ORDER — DEXAMETHASONE SODIUM PHOSPHATE 10 MG/ML
10 INJECTION, SOLUTION INTRAMUSCULAR; INTRAVENOUS ONCE
Status: DISCONTINUED | OUTPATIENT
Start: 2020-08-07 | End: 2020-08-09 | Stop reason: HOSPADM

## 2020-08-07 RX ORDER — SODIUM CHLORIDE 0.9 % (FLUSH) 0.9 %
10 SYRINGE (ML) INJECTION PRN
Status: CANCELLED | OUTPATIENT
Start: 2020-08-14

## 2020-08-07 RX ORDER — EPINEPHRINE 1 MG/ML
0.3 INJECTION, SOLUTION, CONCENTRATE INTRAVENOUS PRN
Status: CANCELLED | OUTPATIENT
Start: 2020-08-07

## 2020-08-07 RX ORDER — SODIUM CHLORIDE 0.9 % (FLUSH) 0.9 %
5 SYRINGE (ML) INJECTION PRN
Status: CANCELLED | OUTPATIENT
Start: 2020-08-14

## 2020-08-07 RX ORDER — METHYLPREDNISOLONE SODIUM SUCCINATE 125 MG/2ML
125 INJECTION, POWDER, LYOPHILIZED, FOR SOLUTION INTRAMUSCULAR; INTRAVENOUS ONCE
Status: CANCELLED | OUTPATIENT
Start: 2020-08-14

## 2020-08-07 RX ORDER — DIPHENHYDRAMINE HYDROCHLORIDE 50 MG/ML
50 INJECTION INTRAMUSCULAR; INTRAVENOUS ONCE
Status: CANCELLED | OUTPATIENT
Start: 2020-08-14

## 2020-08-07 RX ORDER — SODIUM CHLORIDE 9 MG/ML
20 INJECTION, SOLUTION INTRAVENOUS ONCE
Status: CANCELLED | OUTPATIENT
Start: 2020-08-07

## 2020-08-07 RX ADMIN — SODIUM CHLORIDE, PRESERVATIVE FREE 10 ML: 5 INJECTION INTRAVENOUS at 11:13

## 2020-08-07 RX ADMIN — SODIUM CHLORIDE 200 MG: 9 INJECTION, SOLUTION INTRAVENOUS at 09:30

## 2020-08-07 RX ADMIN — GEMCITABINE HYDROCHLORIDE 1980 MG: 1 INJECTION, SOLUTION INTRAVENOUS at 10:03

## 2020-08-07 RX ADMIN — PALONOSETRON 0.25 MG: 0.05 INJECTION, SOLUTION INTRAVENOUS at 08:52

## 2020-08-07 RX ADMIN — CARBOPLATIN 350 MG: 10 INJECTION, SOLUTION INTRAVENOUS at 10:39

## 2020-08-07 RX ADMIN — SODIUM CHLORIDE 150 MG: 900 INJECTION, SOLUTION INTRAVENOUS at 08:52

## 2020-08-07 RX ADMIN — HEPARIN 500 UNITS: 100 SYRINGE at 11:13

## 2020-08-28 ENCOUNTER — HOSPITAL ENCOUNTER (OUTPATIENT)
Dept: INFUSION THERAPY | Age: 65
Discharge: HOME | End: 2020-08-28
Payer: MEDICARE

## 2020-08-28 ENCOUNTER — OFFICE VISIT (OUTPATIENT)
Dept: HEMATOLOGY | Age: 65
End: 2020-08-28
Payer: MEDICARE

## 2020-08-28 VITALS
DIASTOLIC BLOOD PRESSURE: 80 MMHG | WEIGHT: 176.2 LBS | HEART RATE: 75 BPM | SYSTOLIC BLOOD PRESSURE: 180 MMHG | TEMPERATURE: 97.3 F | BODY MASS INDEX: 27.6 KG/M2 | RESPIRATION RATE: 17 BRPM

## 2020-08-28 DIAGNOSIS — C65.1 MALIGNANT NEOPLASM OF RIGHT RENAL PELVIS (HCC): Primary | ICD-10-CM

## 2020-08-28 DIAGNOSIS — C68.9 UROTHELIAL CANCER (HCC): ICD-10-CM

## 2020-08-28 DIAGNOSIS — R53.83 FATIGUE, UNSPECIFIED TYPE: ICD-10-CM

## 2020-08-28 DIAGNOSIS — R53.83 OTHER FATIGUE: ICD-10-CM

## 2020-08-28 LAB
ALBUMIN SERPL-MCNC: 4.2 G/DL (ref 3.5–5.2)
ALP BLD-CCNC: 102 U/L (ref 40–130)
ALT SERPL-CCNC: 26 U/L (ref 21–72)
ANION GAP SERPL CALCULATED.3IONS-SCNC: 9 MMOL/L (ref 7–19)
AST SERPL-CCNC: 45 U/L (ref 17–59)
BASOPHILS ABSOLUTE: 0.02 K/UL (ref 0.01–0.08)
BASOPHILS RELATIVE PERCENT: 0.5 % (ref 0.1–1.2)
BILIRUB SERPL-MCNC: 0.5 MG/DL (ref 0.2–1.3)
BUN BLDV-MCNC: 18 MG/DL (ref 9–20)
CALCIUM SERPL-MCNC: 8.8 MG/DL (ref 8.4–10.2)
CHLORIDE BLD-SCNC: 103 MMOL/L (ref 98–111)
CO2: 25 MMOL/L (ref 22–29)
CREAT SERPL-MCNC: 1.3 MG/DL (ref 0.6–1.2)
EOSINOPHILS ABSOLUTE: 0.08 K/UL (ref 0.04–0.54)
EOSINOPHILS RELATIVE PERCENT: 2.1 % (ref 0.7–7)
GFR NON-AFRICAN AMERICAN: 55
GLOBULIN: 3.6 G/DL
GLUCOSE BLD-MCNC: 250 MG/DL (ref 74–106)
HCT VFR BLD CALC: 33.3 % (ref 40.1–51)
HEMOGLOBIN: 11.7 G/DL (ref 13.7–17.5)
LYMPHOCYTES ABSOLUTE: 0.62 K/UL (ref 1.18–3.74)
LYMPHOCYTES RELATIVE PERCENT: 16.5 % (ref 19.3–53.1)
MCH RBC QN AUTO: 32.8 PG (ref 25.7–32.2)
MCHC RBC AUTO-ENTMCNC: 35.1 G/DL (ref 32.3–36.5)
MCV RBC AUTO: 93.3 FL (ref 79–92.2)
MONOCYTES ABSOLUTE: 0.55 K/UL (ref 0.24–0.82)
MONOCYTES RELATIVE PERCENT: 14.7 % (ref 4.7–12.5)
NEUTROPHILS ABSOLUTE: 2.48 K/UL (ref 1.56–6.13)
NEUTROPHILS RELATIVE PERCENT: 66.2 % (ref 34–71.1)
PDW BLD-RTO: 16.1 % (ref 11.6–14.4)
PLATELET # BLD: 141 K/UL (ref 163–337)
PMV BLD AUTO: 9.2 FL (ref 7.4–10.4)
POTASSIUM SERPL-SCNC: 4.3 MMOL/L (ref 3.5–5.1)
RBC # BLD: 3.57 M/UL (ref 4.63–6.08)
SODIUM BLD-SCNC: 137 MMOL/L (ref 137–145)
TOTAL PROTEIN: 7.8 G/DL (ref 6.3–8.2)
TSH SERPL DL<=0.05 MIU/L-ACNC: 0.48 UIU/ML (ref 0.47–4.68)
WBC # BLD: 3.75 K/UL (ref 4.23–9.07)

## 2020-08-28 PROCEDURE — 96417 CHEMO IV INFUS EACH ADDL SEQ: CPT

## 2020-08-28 PROCEDURE — 96375 TX/PRO/DX INJ NEW DRUG ADDON: CPT

## 2020-08-28 PROCEDURE — 1123F ACP DISCUSS/DSCN MKR DOCD: CPT | Performed by: INTERNAL MEDICINE

## 2020-08-28 PROCEDURE — 6360000002 HC RX W HCPCS: Performed by: INTERNAL MEDICINE

## 2020-08-28 PROCEDURE — 36591 DRAW BLOOD OFF VENOUS DEVICE: CPT

## 2020-08-28 PROCEDURE — G8427 DOCREV CUR MEDS BY ELIG CLIN: HCPCS | Performed by: INTERNAL MEDICINE

## 2020-08-28 PROCEDURE — 80053 COMPREHEN METABOLIC PANEL: CPT

## 2020-08-28 PROCEDURE — 4004F PT TOBACCO SCREEN RCVD TLK: CPT | Performed by: INTERNAL MEDICINE

## 2020-08-28 PROCEDURE — 4040F PNEUMOC VAC/ADMIN/RCVD: CPT | Performed by: INTERNAL MEDICINE

## 2020-08-28 PROCEDURE — G8417 CALC BMI ABV UP PARAM F/U: HCPCS | Performed by: INTERNAL MEDICINE

## 2020-08-28 PROCEDURE — 3017F COLORECTAL CA SCREEN DOC REV: CPT | Performed by: INTERNAL MEDICINE

## 2020-08-28 PROCEDURE — 96413 CHEMO IV INFUSION 1 HR: CPT

## 2020-08-28 PROCEDURE — 84443 ASSAY THYROID STIM HORMONE: CPT

## 2020-08-28 PROCEDURE — 99214 OFFICE O/P EST MOD 30 MIN: CPT | Performed by: INTERNAL MEDICINE

## 2020-08-28 PROCEDURE — 2580000003 HC RX 258: Performed by: INTERNAL MEDICINE

## 2020-08-28 PROCEDURE — 85025 COMPLETE CBC W/AUTO DIFF WBC: CPT

## 2020-08-28 PROCEDURE — 96366 THER/PROPH/DIAG IV INF ADDON: CPT

## 2020-08-28 PROCEDURE — 96367 TX/PROPH/DG ADDL SEQ IV INF: CPT

## 2020-08-28 RX ORDER — EPINEPHRINE 1 MG/ML
0.3 INJECTION, SOLUTION, CONCENTRATE INTRAVENOUS PRN
Status: CANCELLED | OUTPATIENT
Start: 2020-09-18

## 2020-08-28 RX ORDER — SODIUM CHLORIDE 9 MG/ML
INJECTION, SOLUTION INTRAVENOUS CONTINUOUS
Status: CANCELLED | OUTPATIENT
Start: 2020-09-18

## 2020-08-28 RX ORDER — SODIUM CHLORIDE 9 MG/ML
20 INJECTION, SOLUTION INTRAVENOUS ONCE
Status: COMPLETED | OUTPATIENT
Start: 2020-08-28 | End: 2020-08-28

## 2020-08-28 RX ORDER — SODIUM CHLORIDE 0.9 % (FLUSH) 0.9 %
5 SYRINGE (ML) INJECTION PRN
Status: CANCELLED | OUTPATIENT
Start: 2020-09-18

## 2020-08-28 RX ORDER — HEPARIN SODIUM (PORCINE) LOCK FLUSH IV SOLN 100 UNIT/ML 100 UNIT/ML
500 SOLUTION INTRAVENOUS PRN
Status: DISCONTINUED | OUTPATIENT
Start: 2020-08-28 | End: 2020-08-29 | Stop reason: HOSPADM

## 2020-08-28 RX ORDER — DIPHENHYDRAMINE HYDROCHLORIDE 50 MG/ML
50 INJECTION INTRAMUSCULAR; INTRAVENOUS ONCE
Status: CANCELLED | OUTPATIENT
Start: 2020-09-18

## 2020-08-28 RX ORDER — METHYLPREDNISOLONE SODIUM SUCCINATE 125 MG/2ML
125 INJECTION, POWDER, LYOPHILIZED, FOR SOLUTION INTRAMUSCULAR; INTRAVENOUS ONCE
Status: CANCELLED | OUTPATIENT
Start: 2020-08-28

## 2020-08-28 RX ORDER — PALONOSETRON 0.05 MG/ML
0.25 INJECTION, SOLUTION INTRAVENOUS ONCE
Status: COMPLETED | OUTPATIENT
Start: 2020-08-28 | End: 2020-08-28

## 2020-08-28 RX ORDER — SODIUM CHLORIDE 0.9 % (FLUSH) 0.9 %
10 SYRINGE (ML) INJECTION PRN
Status: CANCELLED | OUTPATIENT
Start: 2020-09-18

## 2020-08-28 RX ORDER — SODIUM CHLORIDE 9 MG/ML
20 INJECTION, SOLUTION INTRAVENOUS ONCE
Status: CANCELLED | OUTPATIENT
Start: 2020-09-18

## 2020-08-28 RX ORDER — DIPHENHYDRAMINE HYDROCHLORIDE 50 MG/ML
50 INJECTION INTRAMUSCULAR; INTRAVENOUS ONCE
Status: CANCELLED | OUTPATIENT
Start: 2020-08-28

## 2020-08-28 RX ORDER — HEPARIN SODIUM (PORCINE) LOCK FLUSH IV SOLN 100 UNIT/ML 100 UNIT/ML
500 SOLUTION INTRAVENOUS PRN
Status: CANCELLED | OUTPATIENT
Start: 2020-09-18

## 2020-08-28 RX ORDER — SODIUM CHLORIDE 0.9 % (FLUSH) 0.9 %
5 SYRINGE (ML) INJECTION PRN
Status: CANCELLED | OUTPATIENT
Start: 2020-08-28

## 2020-08-28 RX ORDER — EPINEPHRINE 1 MG/ML
0.3 INJECTION, SOLUTION, CONCENTRATE INTRAVENOUS PRN
Status: CANCELLED | OUTPATIENT
Start: 2020-08-28

## 2020-08-28 RX ORDER — METHYLPREDNISOLONE SODIUM SUCCINATE 125 MG/2ML
125 INJECTION, POWDER, LYOPHILIZED, FOR SOLUTION INTRAMUSCULAR; INTRAVENOUS ONCE
Status: CANCELLED | OUTPATIENT
Start: 2020-09-18

## 2020-08-28 RX ORDER — SODIUM CHLORIDE 0.9 % (FLUSH) 0.9 %
10 SYRINGE (ML) INJECTION PRN
Status: CANCELLED | OUTPATIENT
Start: 2020-08-28

## 2020-08-28 RX ORDER — SODIUM CHLORIDE 9 MG/ML
20 INJECTION, SOLUTION INTRAVENOUS ONCE
Status: CANCELLED | OUTPATIENT
Start: 2020-08-28

## 2020-08-28 RX ORDER — SODIUM CHLORIDE 0.9 % (FLUSH) 0.9 %
10 SYRINGE (ML) INJECTION PRN
Status: DISCONTINUED | OUTPATIENT
Start: 2020-08-28 | End: 2020-08-29 | Stop reason: HOSPADM

## 2020-08-28 RX ORDER — PALONOSETRON 0.05 MG/ML
0.25 INJECTION, SOLUTION INTRAVENOUS ONCE
Status: CANCELLED | OUTPATIENT
Start: 2020-08-28

## 2020-08-28 RX ORDER — SODIUM CHLORIDE 9 MG/ML
INJECTION, SOLUTION INTRAVENOUS CONTINUOUS
Status: CANCELLED | OUTPATIENT
Start: 2020-08-28

## 2020-08-28 RX ORDER — HEPARIN SODIUM (PORCINE) LOCK FLUSH IV SOLN 100 UNIT/ML 100 UNIT/ML
500 SOLUTION INTRAVENOUS PRN
Status: CANCELLED | OUTPATIENT
Start: 2020-08-28

## 2020-08-28 RX ORDER — PALONOSETRON 0.05 MG/ML
0.25 INJECTION, SOLUTION INTRAVENOUS ONCE
Status: CANCELLED
Start: 2020-09-18

## 2020-08-28 RX ADMIN — GEMCITABINE HYDROCHLORIDE 1980 MG: 1 INJECTION, SOLUTION INTRAVENOUS at 11:08

## 2020-08-28 RX ADMIN — SODIUM CHLORIDE, PRESERVATIVE FREE 10 ML: 5 INJECTION INTRAVENOUS at 12:25

## 2020-08-28 RX ADMIN — HEPARIN 500 UNITS: 100 SYRINGE at 12:25

## 2020-08-28 RX ADMIN — SODIUM CHLORIDE 150 MG: 900 INJECTION, SOLUTION INTRAVENOUS at 10:00

## 2020-08-28 RX ADMIN — SODIUM CHLORIDE 200 MG: 9 INJECTION, SOLUTION INTRAVENOUS at 10:36

## 2020-08-28 RX ADMIN — PALONOSETRON 0.25 MG: 0.05 INJECTION, SOLUTION INTRAVENOUS at 09:58

## 2020-08-28 RX ADMIN — CARBOPLATIN 348 MG: 10 INJECTION, SOLUTION INTRAVENOUS at 11:49

## 2020-08-28 RX ADMIN — SODIUM CHLORIDE 20 ML/HR: 9 INJECTION, SOLUTION INTRAVENOUS at 09:55

## 2020-08-28 NOTE — PROGRESS NOTES
Cassi Tinoco   1955 8/28/2020     Chief Complaint   Patient presents with    Cancer       INTERVAL HISTORY/HISTORY OF PRESENT ILLNESS:  Diagnosis   High-grade urothelial carcinoma of the left kidney stage IV, 2010. Recurrence July 2013   Second recurrence October 2015  Treatment summary  1/5/2010-right radical nephrectomy   Relapse February 2013-retroperitoneal soft tissue mass. Gemcitabine/cisplatin completed July 2013 with good partial response, followed by RT 5040 cGy retroperitoneal residual disease, completed October 2013 October 2015- chest wall recurrence, treated with RT   Palliative pembrolizumab  Refer to radiation    Interval history:  The patient is a very pleasant 77years old male who has a history of urothelial carcinoma of the right renal pelvis diagnosed in 2010. He was a stage III at presentation. Unfortunately, he had a recurrence in July 2013 treated with Gemzar/cisplatin with a good partial response and received adjuvant radiation to the retroperitoneal residual disease completed in October 2013. He remained disease-free until October 2015 when he had a chest wall recurrence, biopsy-proven. This was treated with local radiation therapy with a good response. He remained disease-free for another 5 years but unfortunately on follow-up CT scans showed enlarged retroperitoneal lymph nodes. Biopsy at [facility] consistent with recurrent metastatic urothelial carcinoma. The patient was seen by urology/oncology at [facility] and was not deemed a good surgical candidate for resection. He was also seen by radiation oncology and unfortunately he has maximized dose of radiation in the past.  Therefore, the patient was offered palliative treatment with immunotherapy, Keytruda. Carboplatin Gemzar was added with plans to deliver 4 cycles. Otherwise, the patient denies any skin rash, GI symptoms. He denies abdominal pain. He has persistent complaints of fatigue.         Cancer history-High-grade Urothelial carcinoma left renal pelvis  Mr Claudetta Junker was seen in initial oncology consultation on 2/2/2017 as a referral by Dr. Diego Burns office to establish continuity of care of his history of urothelial carcinoma. 7/1/20096023-[id number] right renal cyst measuring 2.9 x 3.4 x 2.8 cm.   12/4/20091682-[id number] biopsy of a right kidney mass was consistent with poorly differentiated adenocarcinoma. 2/24/2010-the patient was evaluated by Dr. Stacy Hodgkins for a right renal tumor. 1/5/2010- He underwent a right radical nephrectomy with the findings of a poorly differentiated adenocarcinoma involving the mid pole of the right kidney measures 6.5 cm in greatest dimension. The tumor extended beyond the renal capsule into the perinephric tissues, but did not extend beyond Gerota's fascia. Margins of resection were negative. Perineural invasion present. No lymph nodes were found in the specimen. No renal vein invasion. Right ureter negative for malignancy. Right adrenal gland negative for malignancy. No lymph nodes identified. Final pathologic staging [id number] stage III. IHC staining performed at [facility] and [facility] showed malignant cells to express PAX-8, p 504s and focal , with negative CK 7 and p63. The case was reviewed by Dr. Yuri Arzola [facility], where GATA3 was performed and reported as positive in a significant number of cells. Thus, in his consultation report, Dr Nixon Tijerina favored urothelial carcinoma over collecting duct carcinoma. 2/24/2010- he was seen by Dr. Christi Goodpasture and offer a consultation at [facility] for clinical trial, but declined. He was placed on surveillance follow-up with surveillance imaging. He had several follow-up CT scans performed in April 2010, October 2010, January 2011, July 2011, January 2012, August 2012 that were all unremarkable for disease recurrence.   ------------------------------ Relapse February 2013--------------------------  2/7/2013-CT scan of the abdomen pelvis revealed retroperitoneal soft tissue density behind the inferior vena cava (2.8 x 2.7 cm) increased in size from prior CT scan on 7/27/2012 02/19/2013- PET/CT scan showed a mass extending along the pericaval lymph node chain from T12-L1 level with SUV 4. Extensive pericaval adenopathy (SUV 5.4). 3/27/2013-he was seen in consultation at [facility]. The retroperitoneal mass could not be resected. He was recommended chemotherapy. He completed palliative chemotherapy with cisplatin/gemcitabine completed on 7/26/2013.   8/7/2013-interval improvement consistent with a positive response to chemotherapy. There was a decrease in size and number of the lymph nodes as well as decrease SUV. 10/30/2013-he completed a course of RT to the retroperitoneal area receiving a total dose of 5040 cGy to the periaortic aortic lymph nodes. 11/18/2013-a CT scan of the abdomen pelvis showed slight diminishment in size in the in nodularity within the retrocaval region. 11/20/2015- He was again seen at [facility] and again resection was not recommended. 10/19/2015-a CT scan of the chest/abdomen pelvis showed new area of soft tissue abnormality in the posterior right chest wall. It measured 2.3 cm and was just medial to the right 11th rib. Another 2.7 cm density anterior to the right 12th rib suggest metastatic disease. 11/24/2015- the patient had a biopsy-proven recurrence on the right chest wall compatible with metastatic carcinoma with glandular differentiation, high-grade. Tissue was sent to [facility] and consistent with metastatic poorly differentiated renal cell carcinoma. Treated with radiation therapy only   12/16/2015- abnormal metabolic activity noted between right 11th rib as described on CT scan for October.  Consistent metastatic disease.  -------------- Clinical/radiological remission April 2016 --------------  4/7/2016-patient had CT scans of the chest/abdomen/pelvis which did not show any evidence of new disease compatible with complete clinical remission. 6/8/2016- he was last seen by Dr. Mary Jo Moran on this date who planned for surveillance scans in October 2016.   10/07/2016- CT scan of the chest/abdomen and pelvis did not show any evidence of metastatic disease, compatible with ongoing complete clinical remission   2/2/2017- PET/CT scan requested and Insurance denied. 3/16/2017- PET/CT scan requested but declined again. 4/18/2017 CT scan of the abdomen and pelvis-showed a stable 1.6 cm retroperitoneal adenopathy. No new evidence of metastatic disease within the abdomen pelvis. 11/29/2017-CT chest/abdomen/pelvis showed no evidence of metastatic disease, compatible with ongoing complete clinical remission. 11/26/2018-CT chest, abdomen, pelvis unremarkable for recurrent disease. 3/30/2020-Ct Chest with contrast A stable CT scan of the chest. No evidence of a neoplastic/metastatic disease. Severe atheromatous changes of coronary arteries similar to the previous study. No evidence of mediastinal or intrathoracic adenopathy. 3/30/2020-CT abdomen pelvis with contrast showed 21 x 17 mm aortocaval lymph node adjacent to the right nephrectomy bed. 4/3/2020- Pet scan showed metastatic lymphadenopathy in the right paraspinal level at T12 and in the aortocaval region. Maximum SUV is in the aortocaval lymph node and measures 14.2. 2. Indeterminate precarinal lymph node demonstrating a maximum SUV of 4.2. This can be followed with subsequent exams. 4/24/2020- EGD/EUS at [facility] and FNA biopsy consistent with recurrent metastatic urothelial carcinoma. IHC positive for PAX-8, PAULY-3 and AE1/AE3. Insufficient cellularity on cellblock for ancillary molecular studies.   4/29/2020-recommended palliative/definitive RT and immunotherapy with pembrolizumab.  5/15/2020-unfortunately not a surgical candidate and Attends [employer] service: None     Active member of club or organization: None     Attends meetings of clubs or organizations: None     Relationship status: None    Intimate partner violence     Fear of current or ex partner: None     Emotionally abused: None     Physically abused: None     Forced sexual activity: None   Other Topics Concern    None   Social History Narrative    None       FAMILY HISTORY:  Family History   Problem Relation Age of Onset    Cervical Cancer Mother     Kidney Cancer Father         Current Outpatient Medications   Medication Sig Dispense Refill    canagliflozin (INVOKANA) 100 MG TABS tablet Take 100 mg by mouth every morning (before breakfast)      lidocaine-prilocaine (EMLA) 2.5-2.5 % cream Apply topically as needed for Pain Apply topically as needed. 1 Tube 1    amLODIPine (NORVASC) 5 MG tablet Take 1 tablet by mouth 2 times daily 30 tablet     Multiple Vitamins-Minerals (PRESERVISION AREDS 2) CAPS Take by mouth 2 times daily      insulin glulisine (APIDRA) 100 UNIT/ML injection Inject  into the skin nightly.  insulin detemir (LEVEMIR) 100 UNIT/ML injection Inject  into the skin nightly.  levothyroxine (SYNTHROID) 125 MCG tablet Take 125 mcg by mouth Daily.  simvastatin (ZOCOR) 40 MG tablet Take 20 mg by mouth nightly       cetirizine (ZYRTEC) 10 MG tablet Take 10 mg by mouth daily. No current facility-administered medications for this visit.          REVIEW OF SYSTEMS:    Constitutional: no fever, no night sweats, fatigue;   HEENT: no blurring of vision, no double vision, no hearing difficulty, no tinnitus,no ulceration, no dysphagia  Lungs: no cough, shortness of breath, no wheeze;   CVS: no palpitation, no chest pain,  shortness of breath;  GI: no abdominal pain, no nausea , no vomiting, no constipation;   RE: no dysuria, frequency and urgency, no hematuria, no kidney stones;   Musculoskeletal: no joint pain, swelling , stiffness;   Endocrine: no polyuria, polydypsia, no cold or heat intolerence; Hematology/lymphatic: no easy brusing or bleeding, no hx of clotting disorder; no peripheral adenopathy. Dermatology: no skin rash, no eczema, no pruritis;   Psychiatry: no depression, no anxiety,no panic attacks, no suicide ideation; Neurology: no syncope, no seizures, no numbness or tingling of hands, no numbness or tingling of feet, no paresis;     PHYSICAL EXAM:    Vitals signs:  BP (!) 180/80   Pulse 75   Temp 97.3 °F (36.3 °C)   Resp 17   Wt 176 lb 3.2 oz (79.9 kg)   BMI 27.60 kg/m²    Pain scale:0    CONSTITUTIONAL: Alert, appropriate, no acute distress,   EYES: Non icteric, EOM intact, pupils equal round and reactive to light and accommodation. ENT: Oral mucus membranes moist, no oral pharyngeal lesions. External inspection of ears and nose are normal.   NECK: Supple, no masses. No palpable thyroid mass    CHEST/LUNGS: CTA bilaterally, normal respiratory effort   CARDIOVASCULAR: RRR, no murmurs. No lower extremity edema   ABDOMEN: soft non-tender, active bowel sounds, no hepatosplenomegaly. No palpable masses. EXTREMITIES: warm, Full ROM of all fours extremities. No focal weakness. SKIN: warm, dry with no rashes or lesions  LYMPH: No cervical, clavicular, axillary, or inguinal lymphadenopathy  NEUROLOGIC: follows commands, non focal.   PSYCH: mood and affect appropriate. Alert and oriented to time and place and person.     Relevant Lab findings/reviewed by me:  Lab Results   Component Value Date    WBC 3.75 (L) 08/28/2020    HGB 11.7 (L) 08/28/2020    HCT 33.3 (L) 08/28/2020    MCV 93.3 (H) 08/28/2020     (L) 08/28/2020     Lab Results   Component Value Date    NEUTROABS 2.48 08/28/2020     Lab Results   Component Value Date     08/28/2020    K 4.3 08/28/2020     08/28/2020    CO2 25 08/28/2020    BUN 18 08/28/2020    CREATININE 1.3 (H) 08/28/2020    GLUCOSE 250 (H) 08/28/2020    CALCIUM 8.8 08/28/2020    PROT 7.8 08/28/2020

## 2020-09-18 ENCOUNTER — OFFICE VISIT (OUTPATIENT)
Dept: HEMATOLOGY | Age: 65
End: 2020-09-18
Payer: MEDICARE

## 2020-09-18 ENCOUNTER — HOSPITAL ENCOUNTER (OUTPATIENT)
Dept: INFUSION THERAPY | Age: 65
Discharge: HOME | End: 2020-09-18
Payer: MEDICARE

## 2020-09-18 VITALS
SYSTOLIC BLOOD PRESSURE: 148 MMHG | BODY MASS INDEX: 27.78 KG/M2 | HEART RATE: 71 BPM | DIASTOLIC BLOOD PRESSURE: 72 MMHG | TEMPERATURE: 96.4 F | WEIGHT: 177 LBS | HEIGHT: 67 IN

## 2020-09-18 DIAGNOSIS — C65.1 MALIGNANT NEOPLASM OF RIGHT RENAL PELVIS (HCC): Primary | ICD-10-CM

## 2020-09-18 DIAGNOSIS — R53.83 OTHER FATIGUE: ICD-10-CM

## 2020-09-18 DIAGNOSIS — C68.9 UROTHELIAL CANCER (HCC): ICD-10-CM

## 2020-09-18 LAB
ALBUMIN SERPL-MCNC: 4.6 G/DL (ref 3.5–5.2)
ALP BLD-CCNC: 111 U/L (ref 40–130)
ALT SERPL-CCNC: 26 U/L (ref 21–72)
ANION GAP SERPL CALCULATED.3IONS-SCNC: 11 MMOL/L (ref 7–19)
AST SERPL-CCNC: 48 U/L (ref 17–59)
BASOPHILS ABSOLUTE: 0.04 K/UL (ref 0.01–0.08)
BASOPHILS RELATIVE PERCENT: 1 % (ref 0.1–1.2)
BILIRUB SERPL-MCNC: 0.5 MG/DL (ref 0.2–1.3)
BUN BLDV-MCNC: 22 MG/DL (ref 9–20)
CALCIUM SERPL-MCNC: 8.9 MG/DL (ref 8.4–10.2)
CHLORIDE BLD-SCNC: 101 MMOL/L (ref 98–111)
CO2: 23 MMOL/L (ref 22–29)
CREAT SERPL-MCNC: 1.4 MG/DL (ref 0.6–1.2)
EOSINOPHILS ABSOLUTE: 0.09 K/UL (ref 0.04–0.54)
EOSINOPHILS RELATIVE PERCENT: 2.2 % (ref 0.7–7)
GFR NON-AFRICAN AMERICAN: 50
GLOBULIN: 3.4 G/DL
GLUCOSE BLD-MCNC: 318 MG/DL (ref 74–106)
HCT VFR BLD CALC: 32.3 % (ref 40.1–51)
HEMOGLOBIN: 11.4 G/DL (ref 13.7–17.5)
LYMPHOCYTES ABSOLUTE: 0.6 K/UL (ref 1.18–3.74)
LYMPHOCYTES RELATIVE PERCENT: 14.4 % (ref 19.3–53.1)
MCH RBC QN AUTO: 33.7 PG (ref 25.7–32.2)
MCHC RBC AUTO-ENTMCNC: 35.3 G/DL (ref 32.3–36.5)
MCV RBC AUTO: 95.6 FL (ref 79–92.2)
MONOCYTES ABSOLUTE: 0.79 K/UL (ref 0.24–0.82)
MONOCYTES RELATIVE PERCENT: 18.9 % (ref 4.7–12.5)
NEUTROPHILS ABSOLUTE: 2.65 K/UL (ref 1.56–6.13)
NEUTROPHILS RELATIVE PERCENT: 63.5 % (ref 34–71.1)
PDW BLD-RTO: 16.1 % (ref 11.6–14.4)
PLATELET # BLD: 172 K/UL (ref 163–337)
PMV BLD AUTO: 9.6 FL (ref 7.4–10.4)
POTASSIUM SERPL-SCNC: 4.5 MMOL/L (ref 3.5–5.1)
RBC # BLD: 3.38 M/UL (ref 4.63–6.08)
SODIUM BLD-SCNC: 135 MMOL/L (ref 137–145)
TOTAL PROTEIN: 7.9 G/DL (ref 6.3–8.2)
TSH SERPL DL<=0.05 MIU/L-ACNC: 0.33 UIU/ML (ref 0.47–4.68)
WBC # BLD: 4.17 K/UL (ref 4.23–9.07)

## 2020-09-18 PROCEDURE — 96413 CHEMO IV INFUSION 1 HR: CPT

## 2020-09-18 PROCEDURE — 84443 ASSAY THYROID STIM HORMONE: CPT

## 2020-09-18 PROCEDURE — G8427 DOCREV CUR MEDS BY ELIG CLIN: HCPCS | Performed by: INTERNAL MEDICINE

## 2020-09-18 PROCEDURE — 80053 COMPREHEN METABOLIC PANEL: CPT

## 2020-09-18 PROCEDURE — 99214 OFFICE O/P EST MOD 30 MIN: CPT | Performed by: INTERNAL MEDICINE

## 2020-09-18 PROCEDURE — 1123F ACP DISCUSS/DSCN MKR DOCD: CPT | Performed by: INTERNAL MEDICINE

## 2020-09-18 PROCEDURE — 4004F PT TOBACCO SCREEN RCVD TLK: CPT | Performed by: INTERNAL MEDICINE

## 2020-09-18 PROCEDURE — 3017F COLORECTAL CA SCREEN DOC REV: CPT | Performed by: INTERNAL MEDICINE

## 2020-09-18 PROCEDURE — 85025 COMPLETE CBC W/AUTO DIFF WBC: CPT

## 2020-09-18 PROCEDURE — 4040F PNEUMOC VAC/ADMIN/RCVD: CPT | Performed by: INTERNAL MEDICINE

## 2020-09-18 PROCEDURE — 2580000003 HC RX 258: Performed by: INTERNAL MEDICINE

## 2020-09-18 PROCEDURE — G8417 CALC BMI ABV UP PARAM F/U: HCPCS | Performed by: INTERNAL MEDICINE

## 2020-09-18 PROCEDURE — 6360000002 HC RX W HCPCS: Performed by: INTERNAL MEDICINE

## 2020-09-18 RX ORDER — METHYLPREDNISOLONE SODIUM SUCCINATE 125 MG/2ML
125 INJECTION, POWDER, LYOPHILIZED, FOR SOLUTION INTRAMUSCULAR; INTRAVENOUS ONCE
Status: CANCELLED | OUTPATIENT
Start: 2020-09-18

## 2020-09-18 RX ORDER — HEPARIN SODIUM (PORCINE) LOCK FLUSH IV SOLN 100 UNIT/ML 100 UNIT/ML
500 SOLUTION INTRAVENOUS PRN
Status: CANCELLED | OUTPATIENT
Start: 2020-09-18

## 2020-09-18 RX ORDER — HEPARIN SODIUM (PORCINE) LOCK FLUSH IV SOLN 100 UNIT/ML 100 UNIT/ML
500 SOLUTION INTRAVENOUS PRN
Status: DISCONTINUED | OUTPATIENT
Start: 2020-09-18 | End: 2020-09-19 | Stop reason: HOSPADM

## 2020-09-18 RX ORDER — DIPHENHYDRAMINE HYDROCHLORIDE 50 MG/ML
50 INJECTION INTRAMUSCULAR; INTRAVENOUS ONCE
Status: CANCELLED | OUTPATIENT
Start: 2020-09-18

## 2020-09-18 RX ORDER — EPINEPHRINE 1 MG/ML
0.3 INJECTION, SOLUTION, CONCENTRATE INTRAVENOUS PRN
Status: CANCELLED | OUTPATIENT
Start: 2020-09-18

## 2020-09-18 RX ORDER — SODIUM CHLORIDE 0.9 % (FLUSH) 0.9 %
5 SYRINGE (ML) INJECTION PRN
Status: CANCELLED | OUTPATIENT
Start: 2020-09-18

## 2020-09-18 RX ORDER — MEPERIDINE HYDROCHLORIDE 50 MG/ML
12.5 INJECTION INTRAMUSCULAR; INTRAVENOUS; SUBCUTANEOUS ONCE
Status: CANCELLED | OUTPATIENT
Start: 2020-09-18

## 2020-09-18 RX ORDER — SODIUM CHLORIDE 0.9 % (FLUSH) 0.9 %
10 SYRINGE (ML) INJECTION PRN
Status: DISCONTINUED | OUTPATIENT
Start: 2020-09-18 | End: 2020-09-19 | Stop reason: HOSPADM

## 2020-09-18 RX ORDER — SODIUM CHLORIDE 9 MG/ML
INJECTION, SOLUTION INTRAVENOUS CONTINUOUS
Status: CANCELLED | OUTPATIENT
Start: 2020-09-18

## 2020-09-18 RX ORDER — SODIUM CHLORIDE 0.9 % (FLUSH) 0.9 %
10 SYRINGE (ML) INJECTION PRN
Status: CANCELLED | OUTPATIENT
Start: 2020-09-18

## 2020-09-18 RX ORDER — SODIUM CHLORIDE 9 MG/ML
20 INJECTION, SOLUTION INTRAVENOUS ONCE
Status: CANCELLED | OUTPATIENT
Start: 2020-09-18

## 2020-09-18 RX ADMIN — SODIUM CHLORIDE, PRESERVATIVE FREE 10 ML: 5 INJECTION INTRAVENOUS at 09:30

## 2020-09-18 RX ADMIN — SODIUM CHLORIDE 400 MG: 9 INJECTION, SOLUTION INTRAVENOUS at 08:53

## 2020-09-18 RX ADMIN — HEPARIN 500 UNITS: 100 SYRINGE at 09:30

## 2020-09-18 NOTE — PROGRESS NOTES
carcinoma left renal pelvis  Mr Fred Lehman was seen in initial oncology consultation on 2/2/2017 as a referral by Dr. Deepika Vargas office to establish continuity of care of his history of urothelial carcinoma. 7/1/20096917-[id number] right renal cyst measuring 2.9 x 3.4 x 2.8 cm.   12/4/20092285-[id number] biopsy of a right kidney mass was consistent with poorly differentiated adenocarcinoma. 2/24/2010-the patient was evaluated by Dr. Ary Manzanares for a right renal tumor. 1/5/2010- He underwent a right radical nephrectomy with the findings of a poorly differentiated adenocarcinoma involving the mid pole of the right kidney measures 6.5 cm in greatest dimension. The tumor extended beyond the renal capsule into the perinephric tissues, but did not extend beyond Gerota's fascia. Margins of resection were negative. Perineural invasion present. No lymph nodes were found in the specimen. No renal vein invasion. Right ureter negative for malignancy. Right adrenal gland negative for malignancy. No lymph nodes identified. Final pathologic staging [id number] stage III. IHC staining performed at [facility] and [facility] showed malignant cells to express PAX-8, p 504s and focal , with negative CK 7 and p63. The case was reviewed by Dr. Avalos Mercury [facility], where GATA3 was performed and reported as positive in a significant number of cells. Thus, in his consultation report, Dr Destiny Will favored urothelial carcinoma over collecting duct carcinoma. 2/24/2010- he was seen by Dr. Rae Mancini and offer a consultation at [facility] for clinical trial, but declined. He was placed on surveillance follow-up with surveillance imaging. He had several follow-up CT scans performed in April 2010, October 2010, January 2011, July 2011, January 2012, August 2012 that were all unremarkable for disease recurrence.   ------------------------------ Relapse February 2013--------------------------  2/7/2013-CT scan of the abdomen pelvis revealed retroperitoneal soft tissue density behind the inferior vena cava (2.8 x 2.7 cm) increased in size from prior CT scan on 7/27/2012 02/19/2013- PET/CT scan showed a mass extending along the pericaval lymph node chain from T12-L1 level with SUV 4. Extensive pericaval adenopathy (SUV 5.4). 3/27/2013-he was seen in consultation at [facility]. The retroperitoneal mass could not be resected. He was recommended chemotherapy. He completed palliative chemotherapy with cisplatin/gemcitabine completed on 7/26/2013.   8/7/2013-interval improvement consistent with a positive response to chemotherapy. There was a decrease in size and number of the lymph nodes as well as decrease SUV. 10/30/2013-he completed a course of RT to the retroperitoneal area receiving a total dose of 5040 cGy to the periaortic aortic lymph nodes. 11/18/2013-a CT scan of the abdomen pelvis showed slight diminishment in size in the in nodularity within the retrocaval region. 11/20/2015- He was again seen at [facility] and again resection was not recommended. 10/19/2015-a CT scan of the chest/abdomen pelvis showed new area of soft tissue abnormality in the posterior right chest wall. It measured 2.3 cm and was just medial to the right 11th rib. Another 2.7 cm density anterior to the right 12th rib suggest metastatic disease. 11/24/2015- the patient had a biopsy-proven recurrence on the right chest wall compatible with metastatic carcinoma with glandular differentiation, high-grade. Tissue was sent to [facility] and consistent with metastatic poorly differentiated renal cell carcinoma. Treated with radiation therapy only   12/16/2015- abnormal metabolic activity noted between right 11th rib as described on CT scan for October.  Consistent metastatic disease.  -------------- Clinical/radiological remission April 2016 --------------  4/7/2016-patient had CT scans of the chest/abdomen/pelvis which did not show any evidence of new disease compatible with complete clinical remission. 6/8/2016- he was last seen by Dr. Neris Nichols on this date who planned for surveillance scans in October 2016.   10/07/2016- CT scan of the chest/abdomen and pelvis did not show any evidence of metastatic disease, compatible with ongoing complete clinical remission   2/2/2017- PET/CT scan requested and Insurance denied. 3/16/2017- PET/CT scan requested but declined again. 4/18/2017 CT scan of the abdomen and pelvis-showed a stable 1.6 cm retroperitoneal adenopathy. No new evidence of metastatic disease within the abdomen pelvis. 11/29/2017-CT chest/abdomen/pelvis showed no evidence of metastatic disease, compatible with ongoing complete clinical remission. 11/26/2018-CT chest, abdomen, pelvis unremarkable for recurrent disease. 3/30/2020-Ct Chest with contrast A stable CT scan of the chest. No evidence of a neoplastic/metastatic disease. Severe atheromatous changes of coronary arteries similar to the previous study. No evidence of mediastinal or intrathoracic adenopathy. 3/30/2020-CT abdomen pelvis with contrast showed 21 x 17 mm aortocaval lymph node adjacent to the right nephrectomy bed. 4/3/2020- Pet scan showed metastatic lymphadenopathy in the right paraspinal level at T12 and in the aortocaval region. Maximum SUV is in the aortocaval lymph node and measures 14.2. 2. Indeterminate precarinal lymph node demonstrating a maximum SUV of 4.2. This can be followed with subsequent exams. 4/24/2020- EGD/EUS at [facility] and FNA biopsy consistent with recurrent metastatic urothelial carcinoma. IHC positive for PAX-8, PAULY-3 and AE1/AE3. Insufficient cellularity on cellblock for ancillary molecular studies. 4/29/2020-recommended palliative/definitive RT and immunotherapy with pembrolizumab.  5/15/2020-unfortunately not a surgical candidate and not a candidate for palliative or definitive RT. Started on immunotherapy with [place] only. 7/29/2020- Ct Abdomen Pelvis W Contrast Interval decrease in size of an aortocaval lymph node near the right nephrectomy bed. Stable hypodense lesion in the right hepatic lobe. Atherosclerotic disease.        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)     Metastatic disease (HCC)     Retinopathy     Urothelial cancer ([facility] [address] 4/29/2013          PAST SURGICAL HISTORY:  Past Surgical History:   Procedure Laterality Date    COLONOSCOPY  2010    Dr Angel Basilio  12/04/2009    LEG SURGERY      PARTIAL NEPHRECTOMY Right 01/05/2012    VASCULAR SURGERY  04- [facility]    us guided cannulation of right internal jugular vein, right internal jugular vein single lumen port placement [employer]    WRIST SURGERY Right         SOCIAL HISTORY:  Social History     Socioeconomic History    Marital status:      Spouse name: Not on file    Number of children: Not on file    Years of education: Not on file    Highest education level: Not on file   Occupational History    Not on file   Social Needs    Financial resource strain: Not on file    Food insecurity     Worry: Not on file     Inability: Not on file    Transportation needs     Medical: Not on file     Non-medical: Not on file   Tobacco Use    Smoking status: Former Smoker     Types: Cigars    Smokeless tobacco: Current User   Substance and Sexual Activity    Alcohol use: No    Drug use: No    Sexual activity: Not on file   Lifestyle    Physical activity     Days per week: Not on file     Minutes per session: Not on file    Stress: Not on file   Relationships    Social connections     Talks on phone: Not on file     Gets together: Not on file     Attends Yarsanism service: Not on file     Active member of club or organization: Not on file     Attends meetings of clubs or organizations: Not on file     Relationship status: Not on file    Intimate partner violence     Fear of current or ex partner: Not on file     Emotionally abused: Not on file     Physically abused: Not on file     Forced sexual activity: Not on file   Other Topics Concern    Not on file   Social History Narrative    Not on file       FAMILY HISTORY:  Family History   Problem Relation Age of Onset    Cervical Cancer Mother     Kidney Cancer Father         Current Outpatient Medications   Medication Sig Dispense Refill    canagliflozin (INVOKANA) 100 MG TABS tablet Take 100 mg by mouth every morning (before breakfast)      lidocaine-prilocaine (EMLA) 2.5-2.5 % cream Apply topically as needed for Pain Apply topically as needed. 1 Tube 1    amLODIPine (NORVASC) 5 MG tablet Take 1 tablet by mouth 2 times daily 30 tablet     Multiple Vitamins-Minerals (PRESERVISION AREDS 2) CAPS Take by mouth 2 times daily      insulin glulisine (APIDRA) 100 UNIT/ML injection Inject  into the skin nightly.  insulin detemir (LEVEMIR) 100 UNIT/ML injection Inject  into the skin nightly.  levothyroxine (SYNTHROID) 125 MCG tablet Take 125 mcg by mouth Daily.  simvastatin (ZOCOR) 40 MG tablet Take 20 mg by mouth nightly       cetirizine (ZYRTEC) 10 MG tablet Take 10 mg by mouth daily. No current facility-administered medications for this visit.       Facility-Administered Medications Ordered in Other Visits   Medication Dose Route Frequency Provider Last Rate Last Dose    pembrolizumab (KEYTRUDA) 400 mg in sodium chloride 0.9 % 100 mL chemo IVPB  400 mg Intravenous Once Юлия Varner MD        heparin flush 100 UNIT/ML injection 500 Units  500 Units Intracatheter PRN Юлия Varner MD        sodium chloride flush 0.9 % injection 10 mL  10 mL Intravenous PRN Jp Haque MD            REVIEW OF SYSTEMS:    Constitutional: no fever, no night sweats, fatigue;   HEENT: no blurring of vision, no double vision, no hearing difficulty, no tinnitus,no ulceration, no dysphagia  Lungs: no cough, shortness of breath, no wheeze;   CVS: no palpitation, no chest pain,  shortness of breath;  GI: no abdominal pain, no nausea , no vomiting, no constipation;   RE: no dysuria, frequency and urgency, no hematuria, no kidney stones;   Musculoskeletal: no joint pain, swelling , stiffness;   Endocrine: no polyuria, polydypsia, no cold or heat intolerence; Hematology/lymphatic: no easy brusing or bleeding, no hx of clotting disorder; no peripheral adenopathy. Dermatology: no skin rash, no eczema, no pruritis;   Psychiatry: no depression, no anxiety,no panic attacks, no suicide ideation; Neurology: no syncope, no seizures, no numbness or tingling of hands, no numbness or tingling of feet, no paresis;     PHYSICAL EXAM:    Vitals signs: There were no vitals taken for this visit. Pain scale:0    CONSTITUTIONAL: Alert, appropriate, no acute distress,   EYES: Non icteric, EOM intact, pupils equal round and reactive to light and accommodation. ENT: Oral mucus membranes moist, no oral pharyngeal lesions. External inspection of ears and nose are normal.   NECK: Supple, no masses. No palpable thyroid mass    CHEST/LUNGS: CTA bilaterally, normal respiratory effort   CARDIOVASCULAR: RRR, no murmurs. No lower extremity edema   ABDOMEN: soft non-tender, active bowel sounds, no hepatosplenomegaly. No palpable masses. EXTREMITIES: warm, Full ROM of all fours extremities. No focal weakness. SKIN: warm, dry with no rashes or lesions  LYMPH: No cervical, clavicular, axillary, or inguinal lymphadenopathy  NEUROLOGIC: follows commands, non focal.   PSYCH: mood and affect appropriate. Alert and oriented to time and place and person.     Relevant Lab findings/reviewed by me:          Relevant Imaging studies/reviewed by me:  No results found. ASSESSMENT    No orders of the defined types were placed in this encounter. Diagnoses and all orders for this visit:    Encounter for antineoplastic immunotherapy    Malignant neoplasm of right renal pelvis [facility])    Care plan discussed with patient    Toxicity, late effect, due to drug, medicine, or biological substance    Adverse effect of chemotherapy, subsequent encounter      Urothelial carcinoma upper urinary tract, PDL-1 100%     Target lesion: retroperitoneal LN    Pembrolizumab regimen:  400 mg IV every 6 weeks. PDL-1 100%    Status post 4 cycles of carboplatin/Gemzar and Keytruda. Continue maintenance Keytruda afterwards for 2 years or until disease progression. At risk thyroid disorder-TSH was suppressed 0.48. CKD stage IIIA-Rickey also has chronic kidney disease stage III. He continues to deny any urinary symptoms to include hematuria. His creatinine is stable at 1.3/GFR 55. Smoke cessation-He has quit and was appraised of his achievement    Hypertension-as per primary care physician. Hyperglycemia-as per PCP. Glucose 250 today. Steroids have been discontinued for his management. Plan  Keytruda 400mg IV   RTC in 3 weeks for Keytruda maintenance 400 mg every 6 weeks  CMP, TSH today  Follow-up with Dr. Viviana Lepe for hypertension/hyperglycemia      I have seen, examined and reviewed this patient medication list, appropriate labs and imaging studies. I reviewed relevant medical records and others physicians notes. I discussed the plans of care with the patient.  I answered all the questions to the patients satisfaction  (Please note that portions of this note were completed with a voice recognition program. Efforts were made to edit the dictations but occasionally words are mis-transcribed.)  Please note that portions of this note may have been completed with a voice recognition program. Efforts were made to edit the dictations, but occasionally words are missed transcribed. Over 50% of the total visit time of 25 minutes in face to face encounter with the patient, out of which more than 50% of the time was spent in counseling patient or family and coordination of care. Counseling included but was not limited to time spent reviewing labs, imaging studies/ treatment plan and answering questions. ?  Follow Up:     No follow-ups on file. Data Mara Every Ezequiel English am scribing for Karol Kern MD. Electronically signed by Ezequiel English on 9/18/2020 at 3:46 PM.   I, Dr Raúl Killian, personally performed the services described in this documentation as scribed by Ezequiel English MA in my presence and is both accurate and complete. Over 50% of the total visit time of 25 minutes in face to face encounter with the patient, out of which more than 50% of the time was spent in counseling patient or family and coordination of care. Counseling included but was not limited to time spent reviewing labs, imaging studies/ treatment plan and answering questions.

## 2020-09-18 NOTE — PROGRESS NOTES
Madan Maldonado   1955 9/18/2020     Chief Complaint   Patient presents with    Cancer     Malignant Neoplasm of the Right Renal Pelvis       INTERVAL HISTORY/HISTORY OF PRESENT ILLNESS:  Diagnosis   High-grade urothelial carcinoma of the left kidney stage IV, 2010. Recurrence July 2013   Second recurrence October 2015  Treatment summary  1/5/2010-right radical nephrectomy   Relapse February 2013-retroperitoneal soft tissue mass. Gemcitabine/cisplatin completed July 2013 with good partial response, followed by RT 5040 cGy retroperitoneal residual disease, completed October 2013 October 2015- chest wall recurrence, treated with RT   Palliative pembrolizumab  Refer to radiation    Interval history:  The patient is a very pleasant 77years old male who has a history of urothelial carcinoma of the right renal pelvis diagnosed in 2010. He was a stage III at presentation. Unfortunately, he had a recurrence in July 2013 treated with Gemzar/cisplatin with a good partial response and received adjuvant radiation to the retroperitoneal residual disease completed in October 2013. He remained disease-free until October 2015 when he had a chest wall recurrence, biopsy-proven. This was treated with local radiation therapy with a good response. He remained disease-free for another 5 years but unfortunately on follow-up CT scans showed enlarged retroperitoneal lymph nodes. Biopsy at [facility] consistent with recurrent metastatic urothelial carcinoma. The patient was seen by urology/oncology at [facility] and was not deemed a good surgical candidate for resection. He was also seen by radiation oncology and unfortunately he has maximized dose of radiation in the past.  Therefore, the patient was offered palliative treatment with immunotherapy, Keytruda. Carboplatin Gemzar was added with plans to deliver 4 cycles. Otherwise, the patient denies any skin rash, GI symptoms. He denies abdominal pain.   He has persistent complaints of fatigue. Cancer history-High-grade Urothelial carcinoma left renal pelvis  Mr Mikie Walters was seen in initial oncology consultation on 2/2/2017 as a referral by Dr. Gavin Hahn office to establish continuity of care of his history of urothelial carcinoma. 7/1/20098996-[id number] right renal cyst measuring 2.9 x 3.4 x 2.8 cm.   12/4/[id number] biopsy of a right kidney mass was consistent with poorly differentiated adenocarcinoma. 2/24/2010-the patient was evaluated by Dr. Franklin Velazquez for a right renal tumor. 1/5/2010- He underwent a right radical nephrectomy with the findings of a poorly differentiated adenocarcinoma involving the mid pole of the right kidney measures 6.5 cm in greatest dimension. The tumor extended beyond the renal capsule into the perinephric tissues, but did not extend beyond Gerota's fascia. Margins of resection were negative. Perineural invasion present. No lymph nodes were found in the specimen. No renal vein invasion. Right ureter negative for malignancy. Right adrenal gland negative for malignancy. No lymph nodes identified. Final pathologic staging [id number] stage III. IHC staining performed at [facility] and [facility] showed malignant cells to express PAX-8, p 504s and focal , with negative CK 7 and p63. The case was reviewed by Dr. Esther Marx [facility], where GATA3 was performed and reported as positive in a significant number of cells. Thus, in his consultation report, Dr Valeria Bernabe favored urothelial carcinoma over collecting duct carcinoma. 2/24/2010- he was seen by Dr. Mario House and offer a consultation at [facility] for clinical trial, but declined. He was placed on surveillance follow-up with surveillance imaging.  He had several follow-up CT scans performed in April 2010, October 2010, January 2011, July 2011, January 2012, August 2012 that were all unremarkable for disease recurrence. ------------------------------ Relapse February 2013--------------------------  2/7/2013-CT scan of the abdomen pelvis revealed retroperitoneal soft tissue density behind the inferior vena cava (2.8 x 2.7 cm) increased in size from prior CT scan on 7/27/2012 02/19/2013- PET/CT scan showed a mass extending along the pericaval lymph node chain from T12-L1 level with SUV 4. Extensive pericaval adenopathy (SUV 5.4). 3/27/2013-he was seen in consultation at [facility]. The retroperitoneal mass could not be resected. He was recommended chemotherapy. He completed palliative chemotherapy with cisplatin/gemcitabine completed on 7/26/2013.   8/7/2013-interval improvement consistent with a positive response to chemotherapy. There was a decrease in size and number of the lymph nodes as well as decrease SUV. 10/30/2013-he completed a course of RT to the retroperitoneal area receiving a total dose of 5040 cGy to the periaortic aortic lymph nodes. 11/18/2013-a CT scan of the abdomen pelvis showed slight diminishment in size in the in nodularity within the retrocaval region. 11/20/2015- He was again seen at [facility] and again resection was not recommended. 10/19/2015-a CT scan of the chest/abdomen pelvis showed new area of soft tissue abnormality in the posterior right chest wall. It measured 2.3 cm and was just medial to the right 11th rib. Another 2.7 cm density anterior to the right 12th rib suggest metastatic disease. 11/24/2015- the patient had a biopsy-proven recurrence on the right chest wall compatible with metastatic carcinoma with glandular differentiation, high-grade. Tissue was sent to [facility] and consistent with metastatic poorly differentiated renal cell carcinoma. Treated with radiation therapy only   12/16/2015- abnormal metabolic activity noted between right 11th rib as described on CT scan for October.  Consistent metastatic disease.  -------------- pembrolizumab.  5/15/2020-unfortunately not a surgical candidate and not a candidate for palliative or definitive RT. Started on immunotherapy with Giacomo Males only. 7/29/2020- Ct Abdomen Pelvis W Contrast Interval decrease in size of an aortocaval lymph node near the right nephrectomy bed. Stable hypodense lesion in the right hepatic lobe. Atherosclerotic disease. Fecal stasis.          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)     Metastatic disease (HCC)     Retinopathy     Urothelial cancer ([facility] [address] 4/29/2013          PAST SURGICAL HISTORY:  Past Surgical History:   Procedure Laterality Date    COLONOSCOPY  2010    Dr Ivette Cardoza  12/04/2009    LEG SURGERY      PARTIAL NEPHRECTOMY Right 01/05/2012    VASCULAR SURGERY  04- [facility]    us guided cannulation of right internal jugular vein, right internal jugular vein single lumen port placement [employer]    WRIST SURGERY Right         SOCIAL HISTORY:  Social History     Socioeconomic History    Marital status:      Spouse name: None    Number of children: None    Years of education: None    Highest education level: None   Occupational History    None   Social Needs    Financial resource strain: None    Food insecurity     Worry: None     Inability: None    Transportation needs     Medical: None     Non-medical: None   Tobacco Use    Smoking status: Former Smoker     Types: Cigars    Smokeless tobacco: Current User   Substance and Sexual Activity    Alcohol use: No    Drug use: No    Sexual activity: None   Lifestyle    Physical activity     Days per week: None     Minutes per session: None    Stress: None   Relationships    Social connections     Talks on phone: None     Gets together: None     Attends [employer] service: None     Active member of club or organization: None     Attends meetings of clubs or organizations: None     Relationship status: None    Intimate partner violence     Fear of current or ex partner: None     Emotionally abused: None     Physically abused: None     Forced sexual activity: None   Other Topics Concern    None   Social History Narrative    None       FAMILY HISTORY:  Family History   Problem Relation Age of Onset    Cervical Cancer Mother     Kidney Cancer Father         Current Outpatient Medications   Medication Sig Dispense Refill    canagliflozin (INVOKANA) 100 MG TABS tablet Take 100 mg by mouth every morning (before breakfast)      lidocaine-prilocaine (EMLA) 2.5-2.5 % cream Apply topically as needed for Pain Apply topically as needed. 1 Tube 1    amLODIPine (NORVASC) 5 MG tablet Take 1 tablet by mouth 2 times daily 30 tablet     Multiple Vitamins-Minerals (PRESERVISION AREDS 2) CAPS Take by mouth 2 times daily      insulin glulisine (APIDRA) 100 UNIT/ML injection Inject  into the skin nightly.  insulin detemir (LEVEMIR) 100 UNIT/ML injection Inject  into the skin nightly.  levothyroxine (SYNTHROID) 125 MCG tablet Take 125 mcg by mouth Daily.  simvastatin (ZOCOR) 40 MG tablet Take 20 mg by mouth nightly       cetirizine (ZYRTEC) 10 MG tablet Take 10 mg by mouth daily. No current facility-administered medications for this visit.       Facility-Administered Medications Ordered in Other Visits   Medication Dose Route Frequency Provider Last Rate Last Dose    pembrolizumab (KEYTRUDA) 400 mg in sodium chloride 0.9 % 100 mL chemo IVPB  400 mg Intravenous Once Rona Palacio MD        heparin flush 100 UNIT/ML injection 500 Units  500 Units Intracatheter PRN Rona Palacio MD        sodium chloride flush 0.9 % injection 10 mL  10 mL Intravenous PRN Jobcompa Olsen M MD Michael            REVIEW OF SYSTEMS:    Constitutional: no fever, no night sweats, fatigue;   HEENT: no blurring of vision, no double vision, no hearing difficulty, no tinnitus,no ulceration, no dysphagia  Lungs: no cough, shortness of breath, no wheeze;   CVS: no palpitation, no chest pain,  shortness of breath;  GI: no abdominal pain, no nausea , no vomiting, no constipation;   RE: no dysuria, frequency and urgency, no hematuria, no kidney stones;   Musculoskeletal: no joint pain, swelling , stiffness;   Endocrine: no polyuria, polydypsia, no cold or heat intolerence; Hematology/lymphatic: no easy brusing or bleeding, no hx of clotting disorder; no peripheral adenopathy. Dermatology: no skin rash, no eczema, no pruritis;   Psychiatry: no depression, no anxiety,no panic attacks, no suicide ideation; Neurology: no syncope, no seizures, no numbness or tingling of hands, no numbness or tingling of feet, no paresis;     PHYSICAL EXAM:    Vitals signs:  BP (!) 148/72   Pulse 71   Temp 96.4 °F (35.8 °C)   Ht 5' 7\" (1.702 m)   Wt 177 lb (80.3 kg)   BMI 27.72 kg/m²    Pain scale:0    CONSTITUTIONAL: Alert, appropriate, no acute distress,   EYES: Non icteric, EOM intact, pupils equal round and reactive to light and accommodation. ENT: Oral mucus membranes moist, no oral pharyngeal lesions. External inspection of ears and nose are normal.   NECK: Supple, no masses. No palpable thyroid mass    CHEST/LUNGS: CTA bilaterally, normal respiratory effort   CARDIOVASCULAR: RRR, no murmurs. No lower extremity edema   ABDOMEN: soft non-tender, active bowel sounds, no hepatosplenomegaly. No palpable masses. EXTREMITIES: warm, Full ROM of all fours extremities. No focal weakness. SKIN: warm, dry with no rashes or lesions  LYMPH: No cervical, clavicular, axillary, or inguinal lymphadenopathy  NEUROLOGIC: follows commands, non focal.   PSYCH: mood and affect appropriate.  Alert and oriented to time and place and person. Relevant Lab findings/reviewed by me:  Lab Results   Component Value Date    WBC 4.17 (L) 09/18/2020    HGB 11.4 (L) 09/18/2020    HCT 32.3 (L) 09/18/2020    MCV 95.6 (H) 09/18/2020     09/18/2020     Lab Results   Component Value Date    NEUTROABS 2.65 09/18/2020       Relevant Imaging studies/reviewed by me:  No results found. ASSESSMENT    No orders of the defined types were placed in this encounter. Dari Ochoa was seen today for cancer. Diagnoses and all orders for this visit:    Chemotherapy management, encounter for    Encounter for antineoplastic immunotherapy    Adverse effect of chemotherapy, subsequent encounter    Care plan discussed with patient    Malignant neoplasm of right renal pelvis (HCC)    Urothelial carcinoma upper urinary tract, PDL-1 100%      Target lesion: retroperitoneal LN     No candidate for further RT. Recommended palliative systemic therapy. Pembrolizumab regimen:  400 mg IV every 6 weeks. PDL-1 100%     Status post 4 cycles carboplatin/Gemzar.     Continue maintenance Keytruda 400 mg every 6 weeks x 2 years or until disease progression. At risk thyroid disorder-TSH was suppressed 0.48.       CKD stage IIIA-Rickey also has chronic kidney disease stage III. He continues to deny any urinary symptoms to include hematuria. His creatinine is stable at 1.3/GFR 55.     Smoke cessation-He has quit and was appraised of his achievement     Hypertension-as per primary care physician.     Hyperglycemia-as per PCP.     Plan  · Continue with maintenance pembrolizumab 400 mg every 6 weeks. · CMP, TSH today  · Follow-up with Dr. Terri Carrasco for hypertension/hyperglycemia    Discontinued steroids from his regimenI have se nicole, examined and reviewed this patient medication list, appropriate labs and imaging studies. I reviewed relevant medical records and others physicians notes. I discussed the plans of care with the patient.  I answered all the questions to the patients satisfaction  (Please note that portions of this note were completed with a voice recognition program. Efforts were made to edit the dictations but occasionally words are mis-transcribed.)  Please note that portions of this note may have been completed with a voice recognition program. Efforts were made to edit the dictations, but occasionally words are missed transcribed. Over 50% of the total visit time of 25 minutes in face to face encounter with the patient, out of which more than 50% of the time was spent in counseling patient or family and coordination of care. Counseling included but was not limited to time spent reviewing labs, imaging studies/ treatment plan and answering questions. ?  Follow Up:     No follow-ups on file. Data Rey Wood am scribing for Eddie Cortez MD. Electronically signed by Christiano Wood on 9/18/2020 at 3:46 PM.   I, Dr Oneyda Valdez, personally performed the services described in this documentation as scribed by Christiano Wood MA in my presence and is both accurate and complete. Over 50% of the total visit time of 25 minutes in face to face encounter with the patient, out of which more than 50% of the time was spent in counseling patient or family and coordination of care. Counseling included but was not limited to time spent reviewing labs, imaging studies/ treatment plan and answering questions.

## 2020-10-28 NOTE — PROGRESS NOTES
Kuldeep Pay   1955  10/30/2020     Chief Complaint   Patient presents with    Cancer     Urothelial Carcinoma       INTERVAL HISTORY/HISTORY OF PRESENT ILLNESS:  Diagnosis   High-grade urothelial carcinoma of the left kidney stage IV, 2010. Recurrence July 2013   Second recurrence October 2015  Treatment summary  1/5/2010-right radical nephrectomy   Relapse February 2013-retroperitoneal soft tissue mass. Gemcitabine/cisplatin completed July 2013 with good partial response, followed by RT 5040 cGy retroperitoneal residual disease, completed October 2013 October 2015- chest wall recurrence, treated with RT   Palliative pembrolizumab    Interval history:  The patient is a very pleasant 77years old male who has a history of urothelial carcinoma of the right renal pelvis diagnosed in 2010. He was a stage III at presentation. Unfortunately, he had a recurrence in July 2013 treated with Gemzar/cisplatin with a good partial response and received adjuvant radiation to the retroperitoneal residual disease completed in October 2013. He remained disease-free until October 2015 when he had a chest wall recurrence, biopsy-proven. This was treated with local radiation therapy with a good response. He remained disease-free for another 5 years but unfortunately on follow-up CT scans showed enlarged retroperitoneal lymph nodes. Biopsy at [address] [place] [address] consistent with recurrent metastatic urothelial carcinoma. The patient was seen by urology/oncology at [address] [place] [address] and was not deemed a good surgical candidate for resection. He was also seen by radiation oncology and unfortunately he has maximized dose of radiation in the past.  Therefore, the patient was offered palliative treatment with immunotherapy, Keytruda. Carboplatin Gemzar was added and he received 4 cycles. Otherwise, the patient denies any skin rash, GI symptoms. He denies abdominal pain. He has persistent complaints of fatigue.     Cancer history-High-grade Urothelial carcinoma left renal pelvis  Mr Steffi Sandhoff was seen in initial oncology consultation on 2/2/2017 [facility] of his history of urothelial carcinoma. 7/1/[id number] right renal cyst measuring 2.9 x 3.4 x 2.8 cm.   12/4/[id number] biopsy of a right kidney mass was consistent with poorly differentiated adenocarcinoma. 2/24/2010-the patient was evaluated by Dr. Sherita Gallagher for a right renal tumor. 1/5/2010- He underwent a right radical nephrectomy with the findings of a poorly differentiated adenocarcinoma involving the mid pole of the right kidney measures 6.5 cm in greatest dimension. The tumor extended beyond the renal capsule into the perinephric tissues, but did not extend beyond Gerota's fascia. Margins of resection were negative. Perineural invasion present. No lymph nodes were found in the specimen. No renal vein invasion. Right ureter negative for malignancy. Right adrenal gland negative for malignancy. No lymph nodes identified. Final pathologic staging [id number] stage III. IHC staining performed at [facility] and [facility] showed malignant cells to express PAX-8, p 504s and focal , with negative CK 7 and p63. The case [facility] GATA3 was performed and reported as positive in a significant number of cells. Thus, in his consultation report, Dr Momo Zamora favored urothelial carcinoma over collecting duct carcinoma. 2/24/2010- he was seen by Dr. Swathi Callejas and offer a consultation at [place] for clinical trial, but declined. He was placed on surveillance follow-up with surveillance imaging. He had several follow-up CT scans performed in April 2010, October 2010, January 2011, July 2011, January 2012, August 2012 that were all unremarkable for disease recurrence.   ------------------------------ Relapse February --------------  4/7/2016-patient had CT scans of the chest/abdomen/pelvis which did not show any evidence of new disease compatible with complete clinical remission. 6/8/2016- he was last seen by Dr. Cristel Nelson on this date who planned for surveillance scans in October 2016.   10/07/2016- CT scan of the chest/abdomen and pelvis did not show any evidence of metastatic disease, compatible with ongoing complete clinical remission   2/2/2017- PET/CT scan requested and Insurance denied. 3/16/2017- PET/CT scan requested but declined again. 4/18/2017 CT scan of the abdomen and pelvis-showed a stable 1.6 cm retroperitoneal adenopathy. No new evidence of metastatic disease within the abdomen pelvis. 11/29/2017-CT chest/abdomen/pelvis showed no evidence of metastatic disease, compatible with ongoing complete clinical remission. 11/26/2018-CT chest, abdomen, pelvis unremarkable for recurrent disease. 3/30/2020-Ct Chest with contrast A stable CT scan of the chest. No evidence of a neoplastic/metastatic disease. Severe atheromatous changes of coronary arteries similar to the previous study. No evidence of mediastinal or intrathoracic adenopathy. 3/30/2020-CT abdomen pelvis with contrast showed 21 x 17 mm aortocaval lymph node adjacent to the right nephrectomy bed. 4/3/2020- Pet scan showed metastatic lymphadenopathy in the right paraspinal level at T12 and in the aortocaval region. Maximum SUV is in the aortocaval lymph node and measures 14.2. 2. Indeterminate precarinal lymph node demonstrating a maximum SUV of 4.2. This can be followed with subsequent exams. 4/24/2020- EGD/EUS at [facility] and FNA biopsy consistent with recurrent metastatic urothelial carcinoma. IHC positive for PAX-8, PAULY-3 and AE1/AE3. Insufficient cellularity on cellblock for ancillary molecular studies.   4/29/2020-recommended palliative/definitive RT and immunotherapy with pembrolizumab.  5/15/2020-unfortunately not a surgical candidate and not a candidate for palliative or definitive RT. Started on immunotherapy with [address] only. 7/29/2020- Ct Abdomen Pelvis W Contrast Interval decrease in size of an aortocaval lymph node near the right nephrectomy bed. Stable hypodense lesion in the right hepatic lobe. Atherosclerotic disease. Fecal stasis.          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)     Metastatic disease (HCC)     Retinopathy     [facility]rothelial cancer ([facility] [address] 4/29/2013          PAST SURGICAL HISTORY:  Past Surgical History:   Procedure Laterality Date    COLONOSCOPY  2010    Dr Milton Mercer  12/04/2009    LEG SURGERY      PARTIAL NEPHRECTOMY Right 01/05/2012    VASCULAR SURGERY  04- SJS    us guided cannulation of right internal jugular vein, right internal jugular vein single lumen port placement [employer]    WRIST SURGERY Right         SOCIAL HISTORY:  Social History     Socioeconomic History    Marital status:      Spouse name: None    Number of children: None    Years of education: None    Highest education level: None   Occupational History    None   Social Needs    Financial resource strain: None    Food insecurity     Worry: None     Inability: None    Transportation needs     Medical: None     Non-medical: None   Tobacco Use    Smoking status: Former Smoker     Types: Cigars    Smokeless tobacco: Current User   Substance and Sexual Activity    Alcohol use: No    Drug use: No    Sexual activity: None   Lifestyle    Physical activity     Days per week: None     Minutes per session: None    Stress: None   Relationships    Social connections     Talks on phone: None     Gets together: None Attends Jew service: None     Active member of club or organization: None     Attends meetings of clubs or organizations: None     Relationship status: None    Intimate partner violence     Fear of current or ex partner: None     Emotionally abused: None     Physically abused: None     Forced sexual activity: None   Other Topics Concern    None   Social History Narrative    None       FAMILY HISTORY:  Family History   Problem Relation Age of Onset    Cervical Cancer Mother     Kidney Cancer Father         Current Outpatient Medications   Medication Sig Dispense Refill    canagliflozin (INVOKANA) 100 MG TABS tablet Take 100 mg by mouth every morning (before breakfast)      lidocaine-prilocaine (EMLA) 2.5-2.5 % cream Apply topically as needed for Pain Apply topically as needed. 1 Tube 1    amLODIPine (NORVASC) 5 MG tablet Take 1 tablet by mouth 2 times daily 30 tablet     Multiple Vitamins-Minerals (PRESERVISION AREDS 2) CAPS Take by mouth 2 times daily      insulin glulisine (APIDRA) 100 UNIT/ML injection Inject  into the skin nightly.  insulin detemir (LEVEMIR) 100 UNIT/ML injection Inject  into the skin nightly.  levothyroxine (SYNTHROID) 125 MCG tablet Take 125 mcg by mouth Daily.  simvastatin (ZOCOR) 40 MG tablet Take 20 mg by mouth nightly       cetirizine (ZYRTEC) 10 MG tablet Take 10 mg by mouth daily. No current facility-administered medications for this visit.       Facility-Administered Medications Ordered in Other Visits   Medication Dose Route Frequency Provider Last Rate Last Dose    pembrolizumab (KEYTRUDA) 400 mg in sodium chloride 0.9 % 100 mL chemo IVPB  400 mg Intravenous Once Giovany Ward MD        sodium chloride flush 0.9 % injection 10 mL  10 mL Intravenous PRN Giovany Ward MD        heparin flush 100 UNIT/ML injection 500 Units  500 Units Intracatheter PRN Giovany Ward MD            REVIEW OF SYSTEMS:    Constitutional: no fever, no night sweats, fatigue;   HEENT: no blurring of vision, no double vision, no hearing difficulty, no tinnitus,no ulceration, no dysphagia  Lungs: no cough, no shortness of breath, no wheeze;   CVS: no palpitation, no chest pain,  no shortness of breath;  GI: no abdominal pain, no nausea , no vomiting, no constipation;   RE: no dysuria, frequency and urgency, no hematuria, no kidney stones;   Musculoskeletal: no joint pain, swelling , stiffness;   Endocrine: no polyuria, polydypsia, no cold or heat intolerence; Hematology/lymphatic: no easy brusing or bleeding, no hx of clotting disorder; no peripheral adenopathy. Dermatology: no skin rash, no eczema, no pruritis;   Psychiatry: no depression, no anxiety,no panic attacks, no suicide ideation; Neurology: no syncope, no seizures, no numbness or tingling of hands, no numbness or tingling of feet, no paresis;     PHYSICAL EXAM:    Vitals signs:  BP (!) 142/74   Pulse 71   Temp 96.9 °F (36.1 °C)   Ht 5' 7\" (1.702 m)   Wt 177 lb (80.3 kg)   BMI 27.72 kg/m²    Pain scale:0    CONSTITUTIONAL: Alert, appropriate, no acute distress,   EYES: Non icteric, EOM intact, pupils equal round and reactive to light and accommodation. ENT: Oral mucus membranes moist, no oral pharyngeal lesions. External inspection of ears and nose are normal.   NECK: Supple, no masses. No palpable thyroid mass    CHEST/LUNGS: CTA bilaterally, normal respiratory effort   CARDIOVASCULAR: RRR, no murmurs. No lower extremity edema   ABDOMEN: soft non-tender, active bowel sounds, no hepatosplenomegaly. No palpable masses. EXTREMITIES: warm, Full ROM of all fours extremities. No focal weakness. SKIN: warm, dry with no rashes or lesions  LYMPH: No cervical, clavicular, axillary, or inguinal lymphadenopathy  NEUROLOGIC: follows commands, non focal.   PSYCH: mood and affect appropriate. Alert and oriented to time and place and person.     Relevant Lab findings/reviewed by me:  9/18/20 TSH 0.325  Lab Results   Component Value Date    WBC 4.17 (L) 09/18/2020    HGB 11.4 (L) 09/18/2020    HCT 32.3 (L) 09/18/2020    MCV 95.6 (H) 09/18/2020     09/18/2020     Lab Results   Component Value Date    NEUTROABS 2.65 09/18/2020       Relevant Imaging studies/reviewed by me:  No results found. ASSESSMENT    No orders of the defined types were placed in this encounter. Baldomero Guallpa was seen today for cancer. Diagnoses and all orders for this visit:    Encounter for antineoplastic immunotherapy    Care plan discussed with patient    Malignant neoplasm of right renal pelvis (HCC)    Adverse effect of antineoplastic and immunosuppressive drugs, subsequent encounter    Urothelial carcinoma upper urinary tract, PDL-1 100%. Target lesion: retroperitoneal LN     No candidate for further RT. Recommended palliative systemic therapy. Pembrolizumab regimen:  400 mg IV every 6 weeks. PDL-1 100%     Status post 4 cycles carboplatin/Gemzar.     Continue maintenance Keytruda 400 mg every 6 weeks x 2 years or until disease progression. At risk thyroid disorder-TSH was suppressed 0.48.        CKD stage IIIA-Rickey also has chronic kidney disease stage III. He continues to deny any urinary symptoms to include hematuria. His creatinine is stable at 1.3/GFR 55.     Smoke cessation-He has quit and was appraised of his achievement     Hypertension-as per primary care physician.     Hyperglycemia-as per PCP.     Plan  · Continue with maintenance pembrolizumab 400 mg every 6 weeks. · CMP, TSH today  · Follow-up with Dr. Chloé Miller for hypertension/hyperglycemia    Discontinued steroids from his regimenI have se nicole, examined and reviewed this patient medication list, appropriate labs and imaging studies. I reviewed relevant medical records and others physicians notes. I discussed the plans of care with the patient.  I answered all the questions to the patients satisfaction  (Please note that portions of this note were completed with a voice recognition program. Efforts were made to edit the dictations but occasionally words are mis-transcribed.)  Please note that portions of this note may have been completed with a voice recognition program. Efforts were made to edit the dictations, but occasionally words are missed transcribed. Over 50% of the total visit time of 25 minutes in face to face encounter with the patient, out of which more than 50% of the time was spent in counseling patient or family and coordination of care. Counseling included but was not limited to time spent reviewing labs, imaging studies/ treatment plan and answering questions. Follow Up:     No follow-ups on file. Data Unavailable      I, Meredith Moreira RN, am scribing for Allen Rodriguez MD. Electronically signed by Meredith Moreira RN on 10/30/2020 at 3:46 PM.   I, Dr Valencia Duran, personally performed the services described in this documentation as scribed by Meredith Moreira RN in my presence and is both accurate and complete. Over 50% of the total visit time of 25 minutes in face to face encounter with the patient, out of which more than 50% of the time was spent in counseling patient or family and coordination of care. Counseling included but was not limited to time spent reviewing labs, imaging studies/ treatment plan and answering questions.

## 2020-10-30 ENCOUNTER — HOSPITAL ENCOUNTER (OUTPATIENT)
Dept: INFUSION THERAPY | Age: 65
Discharge: HOME | End: 2020-10-30
Payer: MEDICARE

## 2020-10-30 ENCOUNTER — OFFICE VISIT (OUTPATIENT)
Dept: HEMATOLOGY | Age: 65
End: 2020-10-30
Payer: MEDICARE

## 2020-10-30 VITALS
DIASTOLIC BLOOD PRESSURE: 74 MMHG | BODY MASS INDEX: 27.78 KG/M2 | HEIGHT: 67 IN | HEART RATE: 71 BPM | TEMPERATURE: 96.9 F | SYSTOLIC BLOOD PRESSURE: 142 MMHG | WEIGHT: 177 LBS

## 2020-10-30 DIAGNOSIS — R53.83 OTHER FATIGUE: ICD-10-CM

## 2020-10-30 DIAGNOSIS — C68.9 UROTHELIAL CANCER (HCC): ICD-10-CM

## 2020-10-30 DIAGNOSIS — C65.1 MALIGNANT NEOPLASM OF RIGHT RENAL PELVIS (HCC): Primary | ICD-10-CM

## 2020-10-30 LAB
ALBUMIN SERPL-MCNC: 4.5 G/DL (ref 3.5–5.2)
ALP BLD-CCNC: 85 U/L (ref 40–130)
ALT SERPL-CCNC: 22 U/L (ref 21–72)
ANION GAP SERPL CALCULATED.3IONS-SCNC: 9 MMOL/L (ref 7–19)
AST SERPL-CCNC: 38 U/L (ref 17–59)
BASOPHILS ABSOLUTE: 0.04 K/UL (ref 0.01–0.08)
BASOPHILS RELATIVE PERCENT: 0.6 % (ref 0.1–1.2)
BILIRUB SERPL-MCNC: 0.3 MG/DL (ref 0.2–1.3)
BUN BLDV-MCNC: 21 MG/DL (ref 9–20)
CALCIUM SERPL-MCNC: 9.2 MG/DL (ref 8.4–10.2)
CHLORIDE BLD-SCNC: 100 MMOL/L (ref 98–111)
CO2: 26 MMOL/L (ref 22–29)
CREAT SERPL-MCNC: 1.2 MG/DL (ref 0.6–1.2)
EOSINOPHILS ABSOLUTE: 0.33 K/UL (ref 0.04–0.54)
EOSINOPHILS RELATIVE PERCENT: 5.2 % (ref 0.7–7)
GFR NON-AFRICAN AMERICAN: >60
GLUCOSE BLD-MCNC: 305 MG/DL (ref 74–106)
HCT VFR BLD CALC: 38 % (ref 40.1–51)
HEMOGLOBIN: 13.6 G/DL (ref 13.7–17.5)
LYMPHOCYTES ABSOLUTE: 0.82 K/UL (ref 1.18–3.74)
LYMPHOCYTES RELATIVE PERCENT: 12.9 % (ref 19.3–53.1)
MCH RBC QN AUTO: 33.1 PG (ref 25.7–32.2)
MCHC RBC AUTO-ENTMCNC: 35.8 G/DL (ref 32.3–36.5)
MCV RBC AUTO: 92.5 FL (ref 79–92.2)
MONOCYTES ABSOLUTE: 0.77 K/UL (ref 0.24–0.82)
MONOCYTES RELATIVE PERCENT: 12.1 % (ref 4.7–12.5)
NEUTROPHILS ABSOLUTE: 4.42 K/UL (ref 1.56–6.13)
NEUTROPHILS RELATIVE PERCENT: 69.2 % (ref 34–71.1)
PDW BLD-RTO: 11.8 % (ref 11.6–14.4)
PLATELET # BLD: 144 K/UL (ref 163–337)
PMV BLD AUTO: 10.1 FL (ref 7.4–10.4)
POTASSIUM SERPL-SCNC: 4.7 MMOL/L (ref 3.5–5.1)
RBC # BLD: 4.11 M/UL (ref 4.63–6.08)
SODIUM BLD-SCNC: 135 MMOL/L (ref 137–145)
TOTAL PROTEIN: 8.1 G/DL (ref 6.3–8.2)
TSH SERPL DL<=0.05 MIU/L-ACNC: 0.17 UIU/ML (ref 0.47–4.68)
WBC # BLD: 6.38 K/UL (ref 4.23–9.07)

## 2020-10-30 PROCEDURE — 80053 COMPREHEN METABOLIC PANEL: CPT

## 2020-10-30 PROCEDURE — G8484 FLU IMMUNIZE NO ADMIN: HCPCS | Performed by: INTERNAL MEDICINE

## 2020-10-30 PROCEDURE — 1123F ACP DISCUSS/DSCN MKR DOCD: CPT | Performed by: INTERNAL MEDICINE

## 2020-10-30 PROCEDURE — 4040F PNEUMOC VAC/ADMIN/RCVD: CPT | Performed by: INTERNAL MEDICINE

## 2020-10-30 PROCEDURE — 3017F COLORECTAL CA SCREEN DOC REV: CPT | Performed by: INTERNAL MEDICINE

## 2020-10-30 PROCEDURE — 2580000003 HC RX 258: Performed by: INTERNAL MEDICINE

## 2020-10-30 PROCEDURE — G8417 CALC BMI ABV UP PARAM F/U: HCPCS | Performed by: INTERNAL MEDICINE

## 2020-10-30 PROCEDURE — 84443 ASSAY THYROID STIM HORMONE: CPT

## 2020-10-30 PROCEDURE — 4004F PT TOBACCO SCREEN RCVD TLK: CPT | Performed by: INTERNAL MEDICINE

## 2020-10-30 PROCEDURE — 96413 CHEMO IV INFUSION 1 HR: CPT

## 2020-10-30 PROCEDURE — 6360000002 HC RX W HCPCS: Performed by: INTERNAL MEDICINE

## 2020-10-30 PROCEDURE — 99214 OFFICE O/P EST MOD 30 MIN: CPT | Performed by: INTERNAL MEDICINE

## 2020-10-30 PROCEDURE — G8427 DOCREV CUR MEDS BY ELIG CLIN: HCPCS | Performed by: INTERNAL MEDICINE

## 2020-10-30 PROCEDURE — 85025 COMPLETE CBC W/AUTO DIFF WBC: CPT

## 2020-10-30 RX ORDER — METHYLPREDNISOLONE SODIUM SUCCINATE 125 MG/2ML
125 INJECTION, POWDER, LYOPHILIZED, FOR SOLUTION INTRAMUSCULAR; INTRAVENOUS ONCE
Status: CANCELLED | OUTPATIENT
Start: 2020-10-30

## 2020-10-30 RX ORDER — EPINEPHRINE 1 MG/ML
0.3 INJECTION, SOLUTION, CONCENTRATE INTRAVENOUS PRN
Status: CANCELLED | OUTPATIENT
Start: 2020-10-30

## 2020-10-30 RX ORDER — DIPHENHYDRAMINE HYDROCHLORIDE 50 MG/ML
50 INJECTION INTRAMUSCULAR; INTRAVENOUS ONCE
Status: CANCELLED | OUTPATIENT
Start: 2020-10-30

## 2020-10-30 RX ORDER — SODIUM CHLORIDE 9 MG/ML
INJECTION, SOLUTION INTRAVENOUS CONTINUOUS
Status: CANCELLED | OUTPATIENT
Start: 2020-10-30

## 2020-10-30 RX ORDER — HEPARIN SODIUM (PORCINE) LOCK FLUSH IV SOLN 100 UNIT/ML 100 UNIT/ML
500 SOLUTION INTRAVENOUS PRN
Status: DISCONTINUED | OUTPATIENT
Start: 2020-10-30 | End: 2020-10-31 | Stop reason: HOSPADM

## 2020-10-30 RX ORDER — MEPERIDINE HYDROCHLORIDE 50 MG/ML
12.5 INJECTION INTRAMUSCULAR; INTRAVENOUS; SUBCUTANEOUS ONCE
Status: CANCELLED | OUTPATIENT
Start: 2020-10-30

## 2020-10-30 RX ORDER — SODIUM CHLORIDE 0.9 % (FLUSH) 0.9 %
5 SYRINGE (ML) INJECTION PRN
Status: CANCELLED | OUTPATIENT
Start: 2020-10-30

## 2020-10-30 RX ORDER — SODIUM CHLORIDE 0.9 % (FLUSH) 0.9 %
10 SYRINGE (ML) INJECTION PRN
Status: DISCONTINUED | OUTPATIENT
Start: 2020-10-30 | End: 2020-10-31 | Stop reason: HOSPADM

## 2020-10-30 RX ORDER — HEPARIN SODIUM (PORCINE) LOCK FLUSH IV SOLN 100 UNIT/ML 100 UNIT/ML
500 SOLUTION INTRAVENOUS PRN
Status: CANCELLED | OUTPATIENT
Start: 2020-10-30

## 2020-10-30 RX ORDER — SODIUM CHLORIDE 9 MG/ML
20 INJECTION, SOLUTION INTRAVENOUS ONCE
Status: CANCELLED | OUTPATIENT
Start: 2020-10-30

## 2020-10-30 RX ORDER — SODIUM CHLORIDE 0.9 % (FLUSH) 0.9 %
10 SYRINGE (ML) INJECTION PRN
Status: CANCELLED | OUTPATIENT
Start: 2020-10-30

## 2020-10-30 RX ADMIN — SODIUM CHLORIDE 400 MG: 9 INJECTION, SOLUTION INTRAVENOUS at 08:43

## 2020-10-30 RX ADMIN — HEPARIN 500 UNITS: 100 SYRINGE at 09:13

## 2020-10-30 RX ADMIN — SODIUM CHLORIDE, PRESERVATIVE FREE 10 ML: 5 INJECTION INTRAVENOUS at 09:13

## 2020-12-02 ENCOUNTER — HOSPITAL ENCOUNTER (OUTPATIENT)
Dept: CT IMAGING | Age: 65
Discharge: HOME | End: 2020-12-02
Payer: MEDICARE

## 2020-12-02 LAB
GFR AFRICAN AMERICAN: 56
GFR NON-AFRICAN AMERICAN: 47
PERFORMED ON: ABNORMAL
POC CREATININE: 1.5 MG/DL (ref 0.3–1.3)
POC SAMPLE TYPE: ABNORMAL

## 2020-12-02 PROCEDURE — 71260 CT THORAX DX C+: CPT

## 2020-12-02 PROCEDURE — 82565 ASSAY OF CREATININE: CPT

## 2020-12-02 PROCEDURE — 74177 CT ABD & PELVIS W/CONTRAST: CPT

## 2020-12-02 PROCEDURE — 6360000004 HC RX CONTRAST MEDICATION: Performed by: INTERNAL MEDICINE

## 2020-12-02 RX ADMIN — IOPAMIDOL 60 ML: 755 INJECTION, SOLUTION INTRAVENOUS at 08:22

## 2020-12-10 NOTE — PROGRESS NOTES
Lee Roll   1955 12/11/2020     Chief Complaint   Patient presents with    Cancer     Urothelial Carcinoma of the left kidney       INTERVAL HISTORY/HISTORY OF PRESENT ILLNESS:  Diagnosis   High-grade urothelial carcinoma of the left kidney stage IV, 2010. Recurrence July 2013   Second recurrence October 2015  Treatment summary  1/5/2010-right radical nephrectomy   Relapse February 2013-retroperitoneal soft tissue mass. Gemcitabine/cisplatin completed July 2013 with good partial response, followed by RT 5040 cGy retroperitoneal residual disease, completed October 2013 October 2015- chest wall recurrence, treated with RT   Palliative pembrolizumab    Interval history:  The patient is a very pleasant 77years old male who has a history of urothelial carcinoma of the right renal pelvis diagnosed in 2010. He was a stage III at presentation. Unfortunately, he had a recurrence in July 2013 treated with Gemzar/cisplatin with a good partial response and received adjuvant radiation to the retroperitoneal residual disease completed in October 2013. He remained disease-free until October 2015 when he had a chest wall recurrence, biopsy-proven. This was treated with local radiation therapy with a good response. He remained disease-free for another 5 years but unfortunately on follow-up CT scans showed enlarged retroperitoneal lymph nodes. Biopsy at [facility] consistent with recurrent metastatic urothelial carcinoma. The patient was seen by urology/oncology at [facility] and was not deemed a good surgical candidate for resection. He was also seen by radiation oncology and unfortunately he has maximized dose of radiation in the past.  Therefore, the patient was offered palliative treatment with immunotherapy, Keytruda. Carboplatin Gemzar was added and he received 4 cycles. Otherwise, the patient denies any skin rash, GI symptoms. He denies abdominal pain. He has persistent complaints of fatigue.     Cancer history-High-grade Urothelial carcinoma left renal pelvis  Mr Bernie Pino was seen in initial oncology consultation on 2/2/2017 as a referral by Dr. Carla Del Rosario office to establish continuity of care of his history of urothelial carcinoma. 7/1/[id number] right renal cyst measuring 2.9 x 3.4 x 2.8 cm.   12/4/[id number] biopsy of a right kidney mass was consistent with poorly differentiated adenocarcinoma. 2/24/2010-the patient was evaluated by Dr. Joselyn Rojas for a right renal tumor. 1/5/2010- He underwent a right radical nephrectomy with the findings of a poorly differentiated adenocarcinoma involving the mid pole of the right kidney measures 6.5 cm in greatest dimension. The tumor extended beyond the renal capsule into the perinephric tissues, but did not extend beyond Gerota's fascia. Margins of resection were negative. Perineural invasion present. No lymph nodes were found in the specimen. No renal vein invasion. Right ureter negative for malignancy. Right adrenal gland negative for malignancy. No lymph nodes identified. Final pathologic staging [id number] stage III. IHC staining performed at [facility] and [facility] showed malignant cells to express PAX-8, p 504s and focal , with negative CK 7 and p63. The case was reviewed by Dr. Melo Flair [facility], where GATA3 was performed and reported as positive in a significant number of cells. Thus, in his consultation report, Dr Rox Jasmine favored urothelial carcinoma over collecting duct carcinoma. 2/24/2010- he was seen by Dr. Andrew Campos and offer a consultation at [facility] for clinical trial, but declined. He was placed on surveillance follow-up with surveillance imaging. He had several follow-up CT scans performed in April 2010, October 2010, January 2011, July 2011, January 2012, August 2012 that were all unremarkable for disease recurrence.   ------------------------------ Relapse February 2013--------------------------  2/7/2013-CT scan of the abdomen pelvis revealed retroperitoneal soft tissue density behind the inferior vena cava (2.8 x 2.7 cm) increased in size from prior CT scan on 7/27/2012 02/19/2013- PET/CT scan showed a mass extending along the pericaval lymph node chain from T12-L1 level with SUV 4. Extensive pericaval adenopathy (SUV 5.4). 3/27/2013-he was seen in consultation at [facility]. The retroperitoneal mass could not be resected. He was recommended chemotherapy. He completed palliative chemotherapy with cisplatin/gemcitabine completed on 7/26/2013.   8/7/2013-interval improvement consistent with a positive response to chemotherapy. There was a decrease in size and number of the lymph nodes as well as decrease SUV. 10/30/2013-he completed a course of RT to the retroperitoneal area receiving a total dose of 5040 cGy to the periaortic aortic lymph nodes. 11/18/2013-a CT scan of the abdomen pelvis showed slight diminishment in size in the in nodularity within the retrocaval region. 11/20/2015- He was again seen at [facility] and again resection was not recommended. 10/19/2015-a CT scan of the chest/abdomen pelvis showed new area of soft tissue abnormality in the posterior right chest wall. It measured 2.3 cm and was just medial to the right 11th rib. Another 2.7 cm density anterior to the right 12th rib suggest metastatic disease. 11/24/2015- the patient had a biopsy-proven recurrence on the right chest wall compatible with metastatic carcinoma with glandular differentiation, high-grade. Tissue was sent to [facility] and consistent with metastatic poorly differentiated renal cell carcinoma. Treated with radiation therapy only   12/16/2015- abnormal metabolic activity noted between right 11th rib as described on CT scan for October.  Consistent metastatic disease.  -------------- Clinical/radiological remission April 2016 --------------  4/7/2016-patient had CT scans of the chest/abdomen/pelvis which did not show any evidence of new disease compatible with complete clinical remission. 6/8/2016- he was last seen by Dr. Maria C De Paz on this date who planned for surveillance scans in October 2016.   10/07/2016- CT scan of the chest/abdomen and pelvis did not show any evidence of metastatic disease, compatible with ongoing complete clinical remission   2/2/2017- PET/CT scan requested and Insurance denied. 3/16/2017- PET/CT scan requested but declined again. 4/18/2017 CT scan of the abdomen and pelvis-showed a stable 1.6 cm retroperitoneal adenopathy. No new evidence of metastatic disease within the abdomen pelvis. 11/29/2017-CT chest/abdomen/pelvis showed no evidence of metastatic disease, compatible with ongoing complete clinical remission. 11/26/2018-CT chest, abdomen, pelvis unremarkable for recurrent disease. 3/30/2020-Ct Chest with contrast A stable CT scan of the chest. No evidence of a neoplastic/metastatic disease. Severe atheromatous changes of coronary arteries similar to the previous study. No evidence of mediastinal or intrathoracic adenopathy. 3/30/2020-CT abdomen pelvis with contrast showed 21 x 17 mm aortocaval lymph node adjacent to the right nephrectomy bed. 4/3/2020- Pet scan showed metastatic lymphadenopathy in the right paraspinal level at T12 and in the aortocaval region. Maximum SUV is in the aortocaval lymph node and measures 14.2. 2. Indeterminate precarinal lymph node demonstrating a maximum SUV of 4.2. This can be followed with subsequent exams. 4/24/2020- EGD[facility] at [facility] and FNA biopsy consistent with recurrent metastatic urothelial carcinoma. IHC positive for PAX-8, PAULY-3 and AE1/AE3. Insufficient cellularity on cellblock for ancillary molecular studies.   4/29/2020-recommended palliative/definitive RT and immunotherapy with pembrolizumab.  5/15/2020-unfortunately not a surgical candidate and not a candidate for palliative or definitive RT. Started on immunotherapy with Nena Squibb only. 7/29/2020- Ct Abdomen Pelvis W Contrast Interval decrease in size of an aortocaval lymph node near the right nephrectomy bed. Stable hypodense lesion in the right hepatic lobe. Atherosclerotic disease. Fecal stasis. 12/2/20 Ct Chest W Contrast No acute cardiopulmonary process. No evidence of metastatic disease. Vascular calcifications present aortic arch and coronary arteries. 12/2/20 Ct Abdomen Pelvis W Iv Contrast  No evidence of disease progression. Stable subcentimeter right liver lobe lesion. Stable 7 mm aortocaval lymph node. Right nephrectomy. Borderline prostate size.      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)     Metastatic disease (HCC)     Retinopathy     Urothelial cancer ([facility] [address] 4/29/2013          PAST SURGICAL HISTORY:  Past Surgical History:   Procedure Laterality Date    COLONOSCOPY  2010    Dr Blue Fossa  12/04/2009    LEG SURGERY      PARTIAL NEPHRECTOMY Right 01/05/2012    VASCULAR SURGERY  04- [facility]    us guided cannulation of right internal jugular vein, right internal jugular vein single lumen port placement [employer]    WRIST SURGERY Right         SOCIAL HISTORY:  Social History     Socioeconomic History    Marital status:      Spouse name: None    Number of children: None    Years of education: None    Highest education level: None   Occupational History    None   Social Needs    Financial resource strain: None    Food insecurity     Worry: None     Inability: None    Transportation needs     Medical: None     Non-medical: None   Tobacco Use    Smoking status: Former Smoker     Types: Cigars    Smokeless tobacco: Current User   Substance and Sexual Activity    Alcohol use: No    Drug use: No    Sexual activity: None   Lifestyle    Physical activity     Days per week: None     Minutes per session: None    Stress: None   Relationships    Social connections     Talks on phone: None     Gets together: None     Attends Bahai service: None     Active member of club or organization: None     Attends meetings of clubs or organizations: None     Relationship status: None    Intimate partner violence     Fear of current or ex partner: None     Emotionally abused: None     Physically abused: None     Forced sexual activity: None   Other Topics Concern    None   Social History Narrative    None       FAMILY HISTORY:  Family History   Problem Relation Age of Onset    Cervical Cancer Mother     Kidney Cancer Father         Current Outpatient Medications   Medication Sig Dispense Refill    canagliflozin (INVOKANA) 100 MG TABS tablet Take 100 mg by mouth every morning (before breakfast)      lidocaine-prilocaine (EMLA) 2.5-2.5 % cream Apply topically as needed for Pain Apply topically as needed. 1 Tube 1    amLODIPine (NORVASC) 5 MG tablet Take 1 tablet by mouth 2 times daily 30 tablet     Multiple Vitamins-Minerals (PRESERVISION AREDS 2) CAPS Take by mouth 2 times daily      insulin glulisine (APIDRA) 100 UNIT/ML injection Inject  into the skin nightly.  insulin detemir (LEVEMIR) 100 UNIT/ML injection Inject  into the skin nightly.  levothyroxine (SYNTHROID) 125 MCG tablet Take 125 mcg by mouth Daily.  simvastatin (ZOCOR) 40 MG tablet Take 20 mg by mouth nightly       cetirizine (ZYRTEC) 10 MG tablet Take 10 mg by mouth daily. No current facility-administered medications for this visit.          REVIEW OF SYSTEMS:    Constitutional: no fever, no night sweats, fatigue;   HEENT: no blurring of vision, no double vision, no hearing MCV 90.7 12/11/2020     (L) 12/11/2020     Lab Results   Component Value Date    NEUTROABS 3.71 12/11/2020     Lab Results   Component Value Date     (L) 12/11/2020    K 4.5 12/11/2020     12/11/2020    CO2 25 12/11/2020    BUN 27 (H) 12/11/2020    CREATININE 1.4 (H) 12/11/2020    GLUCOSE 270 (H) 12/11/2020    CALCIUM 9.5 12/11/2020    PROT 7.7 12/11/2020    LABALBU 4.3 12/11/2020    BILITOT 0.5 12/11/2020    ALKPHOS 98 12/11/2020    AST 36 12/11/2020    ALT 22 12/11/2020    LABGLOM 50 (A) 12/11/2020    GFRAA 56 (A) 12/02/2020    GLOB 3.4 09/18/2020     Lab Results   Component Value Date    TSH 0.053 (L) 12/11/2020       Relevant Imaging studies/reviewed by me:  Ct Chest W Contrast    Result Date: 12/2/2020  1. No acute cardiopulmonary process. 2. No evidence of metastatic disease. 3. Vascular calcifications present aortic arch and coronary arteries. Signed by Dr Francisco Browning on 12/2/2020 8:33 AM    Ct Abdomen Pelvis W Iv Contrast Additional Contrast? Oral    Result Date: 12/2/2020  1. No evidence of disease progression. Stable subcentimeter right liver lobe lesion. Stable 7 mm aortocaval lymph node. 2. Right nephrectomy. 3. Borderline prostate size. Signed by Dr Kay Wheeler on 12/2/2020 8:36 AM    ASSESSMENT:    No orders of the defined types were placed in this encounter. Marisa Edgar was seen today for cancer. Diagnoses and all orders for this visit:    Encounter for antineoplastic immunotherapy    Urothelial cancer [facility])    Care plan discussed with patient    Side effects of treatment, subsequent encounter    Stage 3a chronic kidney disease      #Urothelial carcinoma upper urinary tract, PDL-1 100%. Target lesion: retroperitoneal LN  No candidate for further RT. Recommended palliative systemic therapy. Pembrolizumab regimen:  400 mg IV every 6 weeks. PDL-1 100%  Status post 4 cycles carboplatin/Gemzar.   Continue maintenance Keytruda 400 mg every 6 weeks x 2 years or until disease progression. At risk thyroid disorder-TSH was suppressed 0.48.        CKD stage IIIA-Rickey also has chronic kidney disease stage III. He continues to deny any urinary symptoms to include hematuria. His creatinine is stable at 1.3/GFR 55.     Smoke cessation-He has quit and was appraised of his achievement     Hypertension-as per primary care physician.     Hyperglycemia-as per PCP.     Plan:  · Continue with maintenance pembrolizumab 400 mg every 6 weeks. · CMP, TSH today  · Will continue to monitor TSH  · Follow-up with Dr. Anne-Marie Freed for hypertension/hyperglycemia  · RTC MD 6 weeks    Discontinued steroids from his regimenI have se nicole, examined and reviewed this patient medication list, appropriate labs and imaging studies. I reviewed relevant medical records and others physicians notes. I discussed the plans of care with the patient. I answered all the questions to the patients satisfaction  (Please note that portions of this note were completed with a voice recognition program. Efforts were made to edit the dictations but occasionally words are mis-transcribed.)  Please note that portions of this note may have been completed with a voice recognition program. Efforts were made to edit the dictations, but occasionally words are missed transcribed. Over 50% of the total visit time of 25 minutes in face to face encounter with the patient, out of which more than 50% of the time was spent in counseling patient or family and coordination of care. Counseling included but was not limited to time spent reviewing labs, imaging studies/ treatment plan and answering questions. Follow Up:     Return in about 6 weeks (around 1/22/2021) for Treatment & see Paulette Godwin in [address] [place] [address].    Data Unavailable      Suzanne ZAMORA RN, am scribing for Giovany Ward MD. Electronically signed by Suzanne Herrera RN on 12/12/2020 at 3:46 PM.   I, Dr Vira Guo, personally performed the services described in

## 2020-12-11 ENCOUNTER — HOSPITAL ENCOUNTER (OUTPATIENT)
Dept: INFUSION THERAPY | Age: 65
Discharge: HOME | End: 2020-12-11
Payer: MEDICARE

## 2020-12-11 ENCOUNTER — OFFICE VISIT (OUTPATIENT)
Dept: HEMATOLOGY | Age: 65
End: 2020-12-11
Payer: MEDICARE

## 2020-12-11 VITALS
HEART RATE: 72 BPM | TEMPERATURE: 97.1 F | SYSTOLIC BLOOD PRESSURE: 138 MMHG | DIASTOLIC BLOOD PRESSURE: 80 MMHG | BODY MASS INDEX: 27.78 KG/M2 | WEIGHT: 177 LBS | HEIGHT: 67 IN

## 2020-12-11 DIAGNOSIS — C68.9 UROTHELIAL CANCER (HCC): ICD-10-CM

## 2020-12-11 DIAGNOSIS — R53.83 OTHER FATIGUE: ICD-10-CM

## 2020-12-11 DIAGNOSIS — C65.1 MALIGNANT NEOPLASM OF RIGHT RENAL PELVIS (HCC): Primary | ICD-10-CM

## 2020-12-11 LAB
ALBUMIN SERPL-MCNC: 4.3 G/DL (ref 3.5–5.2)
ALP BLD-CCNC: 98 U/L (ref 40–130)
ALT SERPL-CCNC: 22 U/L (ref 21–72)
ANION GAP SERPL CALCULATED.3IONS-SCNC: 10 MMOL/L (ref 7–19)
AST SERPL-CCNC: 36 U/L (ref 17–59)
BASOPHILS ABSOLUTE: 0.05 K/UL (ref 0.01–0.08)
BASOPHILS RELATIVE PERCENT: 0.9 % (ref 0.1–1.2)
BILIRUB SERPL-MCNC: 0.5 MG/DL (ref 0.2–1.3)
BUN BLDV-MCNC: 27 MG/DL (ref 9–20)
CALCIUM SERPL-MCNC: 9.5 MG/DL (ref 8.4–10.2)
CHLORIDE BLD-SCNC: 100 MMOL/L (ref 98–111)
CO2: 25 MMOL/L (ref 22–29)
CREAT SERPL-MCNC: 1.4 MG/DL (ref 0.6–1.2)
EOSINOPHILS ABSOLUTE: 0.33 K/UL (ref 0.04–0.54)
EOSINOPHILS RELATIVE PERCENT: 5.9 % (ref 0.7–7)
GFR NON-AFRICAN AMERICAN: 50
GLUCOSE BLD-MCNC: 270 MG/DL (ref 74–106)
HCT VFR BLD CALC: 37.9 % (ref 40.1–51)
HEMOGLOBIN: 13.6 G/DL (ref 13.7–17.5)
LYMPHOCYTES ABSOLUTE: 0.87 K/UL (ref 1.18–3.74)
LYMPHOCYTES RELATIVE PERCENT: 15.6 % (ref 19.3–53.1)
MCH RBC QN AUTO: 32.5 PG (ref 25.7–32.2)
MCHC RBC AUTO-ENTMCNC: 35.9 G/DL (ref 32.3–36.5)
MCV RBC AUTO: 90.7 FL (ref 79–92.2)
MONOCYTES ABSOLUTE: 0.63 K/UL (ref 0.24–0.82)
MONOCYTES RELATIVE PERCENT: 11.3 % (ref 4.7–12.5)
NEUTROPHILS ABSOLUTE: 3.71 K/UL (ref 1.56–6.13)
NEUTROPHILS RELATIVE PERCENT: 66.3 % (ref 34–71.1)
PDW BLD-RTO: 11.7 % (ref 11.6–14.4)
PLATELET # BLD: 145 K/UL (ref 163–337)
PMV BLD AUTO: 9.7 FL (ref 7.4–10.4)
POTASSIUM SERPL-SCNC: 4.5 MMOL/L (ref 3.5–5.1)
RBC # BLD: 4.18 M/UL (ref 4.63–6.08)
SODIUM BLD-SCNC: 135 MMOL/L (ref 137–145)
TOTAL PROTEIN: 7.7 G/DL (ref 6.3–8.2)
TSH SERPL DL<=0.05 MIU/L-ACNC: 0.05 UIU/ML (ref 0.47–4.68)
WBC # BLD: 5.59 K/UL (ref 4.23–9.07)

## 2020-12-11 PROCEDURE — 4004F PT TOBACCO SCREEN RCVD TLK: CPT | Performed by: INTERNAL MEDICINE

## 2020-12-11 PROCEDURE — 6360000002 HC RX W HCPCS: Performed by: INTERNAL MEDICINE

## 2020-12-11 PROCEDURE — 2580000003 HC RX 258: Performed by: INTERNAL MEDICINE

## 2020-12-11 PROCEDURE — 84443 ASSAY THYROID STIM HORMONE: CPT

## 2020-12-11 PROCEDURE — 96413 CHEMO IV INFUSION 1 HR: CPT

## 2020-12-11 PROCEDURE — G8427 DOCREV CUR MEDS BY ELIG CLIN: HCPCS | Performed by: INTERNAL MEDICINE

## 2020-12-11 PROCEDURE — 99214 OFFICE O/P EST MOD 30 MIN: CPT | Performed by: INTERNAL MEDICINE

## 2020-12-11 PROCEDURE — G8417 CALC BMI ABV UP PARAM F/U: HCPCS | Performed by: INTERNAL MEDICINE

## 2020-12-11 PROCEDURE — 85025 COMPLETE CBC W/AUTO DIFF WBC: CPT

## 2020-12-11 PROCEDURE — 4040F PNEUMOC VAC/ADMIN/RCVD: CPT | Performed by: INTERNAL MEDICINE

## 2020-12-11 PROCEDURE — 36415 COLL VENOUS BLD VENIPUNCTURE: CPT

## 2020-12-11 PROCEDURE — 3017F COLORECTAL CA SCREEN DOC REV: CPT | Performed by: INTERNAL MEDICINE

## 2020-12-11 PROCEDURE — G8484 FLU IMMUNIZE NO ADMIN: HCPCS | Performed by: INTERNAL MEDICINE

## 2020-12-11 PROCEDURE — 80053 COMPREHEN METABOLIC PANEL: CPT

## 2020-12-11 PROCEDURE — 1123F ACP DISCUSS/DSCN MKR DOCD: CPT | Performed by: INTERNAL MEDICINE

## 2020-12-11 RX ORDER — MEPERIDINE HYDROCHLORIDE 50 MG/ML
12.5 INJECTION INTRAMUSCULAR; INTRAVENOUS; SUBCUTANEOUS ONCE
Status: CANCELLED | OUTPATIENT
Start: 2020-12-11

## 2020-12-11 RX ORDER — SODIUM CHLORIDE 9 MG/ML
INJECTION, SOLUTION INTRAVENOUS CONTINUOUS
Status: CANCELLED | OUTPATIENT
Start: 2020-12-11

## 2020-12-11 RX ORDER — HEPARIN SODIUM (PORCINE) LOCK FLUSH IV SOLN 100 UNIT/ML 100 UNIT/ML
500 SOLUTION INTRAVENOUS PRN
Status: DISCONTINUED | OUTPATIENT
Start: 2020-12-11 | End: 2020-12-12 | Stop reason: HOSPADM

## 2020-12-11 RX ORDER — SODIUM CHLORIDE 9 MG/ML
20 INJECTION, SOLUTION INTRAVENOUS ONCE
Status: CANCELLED | OUTPATIENT
Start: 2020-12-11

## 2020-12-11 RX ORDER — SODIUM CHLORIDE 0.9 % (FLUSH) 0.9 %
10 SYRINGE (ML) INJECTION PRN
Status: CANCELLED | OUTPATIENT
Start: 2020-12-11

## 2020-12-11 RX ORDER — METHYLPREDNISOLONE SODIUM SUCCINATE 125 MG/2ML
125 INJECTION, POWDER, LYOPHILIZED, FOR SOLUTION INTRAMUSCULAR; INTRAVENOUS ONCE
Status: CANCELLED | OUTPATIENT
Start: 2020-12-11

## 2020-12-11 RX ORDER — SODIUM CHLORIDE 0.9 % (FLUSH) 0.9 %
5 SYRINGE (ML) INJECTION PRN
Status: CANCELLED | OUTPATIENT
Start: 2020-12-11

## 2020-12-11 RX ORDER — EPINEPHRINE 1 MG/ML
0.3 INJECTION, SOLUTION, CONCENTRATE INTRAVENOUS PRN
Status: CANCELLED | OUTPATIENT
Start: 2020-12-11

## 2020-12-11 RX ORDER — HEPARIN SODIUM (PORCINE) LOCK FLUSH IV SOLN 100 UNIT/ML 100 UNIT/ML
500 SOLUTION INTRAVENOUS PRN
Status: CANCELLED | OUTPATIENT
Start: 2020-12-11

## 2020-12-11 RX ORDER — DIPHENHYDRAMINE HYDROCHLORIDE 50 MG/ML
50 INJECTION INTRAMUSCULAR; INTRAVENOUS ONCE
Status: CANCELLED | OUTPATIENT
Start: 2020-12-11

## 2020-12-11 RX ORDER — SODIUM CHLORIDE 0.9 % (FLUSH) 0.9 %
10 SYRINGE (ML) INJECTION PRN
Status: DISCONTINUED | OUTPATIENT
Start: 2020-12-11 | End: 2020-12-12 | Stop reason: HOSPADM

## 2020-12-11 RX ADMIN — HEPARIN 500 UNITS: 100 SYRINGE at 09:54

## 2020-12-11 RX ADMIN — SODIUM CHLORIDE, PRESERVATIVE FREE 10 ML: 5 INJECTION INTRAVENOUS at 09:54

## 2020-12-11 RX ADMIN — SODIUM CHLORIDE 400 MG: 9 INJECTION, SOLUTION INTRAVENOUS at 09:22

## 2021-01-21 NOTE — PROGRESS NOTES
Cherie Ac   1955 1/22/2021     Chief Complaint   Patient presents with    Cancer    Chemotherapy       INTERVAL HISTORY/HISTORY OF PRESENT ILLNESS:  Diagnosis   High-grade urothelial carcinoma of the left kidney stage IV, 2010. Recurrence July 2013   Second recurrence October 2015  Treatment summary  1/5/2010right radical nephrectomy   Relapse February 2013-retroperitoneal soft tissue mass.    Gemcitabine/cisplatin completed July 2013 with good partial response, followed by RT 5040 cGy retroperitoneal residual disease, completed October 2013 October 2015- chest wall recurrence, treated with RT   Palliative pembrolizumab    Interval history: 3/27/2013he was seen in consultation at [facility]. The retroperitoneal mass could not be resected. He was recommended chemotherapy. He completed palliative chemotherapy with cisplatin/gemcitabine completed on 7/26/2013.   8/7/2013interval improvement consistent with a positive response to chemotherapy. There was a decrease in size and number of the lymph nodes as well as decrease SUV. 10/30/2013he completed a course of RT to the retroperitoneal area receiving a total dose of 5040 cGy to the periaortic aortic lymph nodes. 11/18/2013a CT scan of the abdomen pelvis showed slight diminishment in size in the in nodularity within the retrocaval region. 11/20/2015 He was again seen at [facility] and again resection was not recommended. 10/19/2015a CT scan of the chest/abdomen pelvis showed new area of soft tissue abnormality in the posterior right chest wall. It measured 2.3 cm and was just medial to the right 11th rib. Another 2.7 cm density anterior to the right 12th rib suggest metastatic disease. 11/24/2015- the patient had a biopsy-proven recurrence on the right chest wall compatible with metastatic carcinoma with glandular differentiation, high-grade. Tissue was sent to [facility] and consistent with metastatic poorly differentiated renal cell carcinoma. Treated with radiation therapy only   12/16/2015- abnormal metabolic activity noted between right 11th rib as described on CT scan for October. Consistent metastatic disease.  -------------- Clinical/radiological remission April 2016 --------------  4/7/2016patient had CT scans of the chest/abdomen/pelvis which did not show any evidence of new disease compatible with complete clinical remission. 6/8/2016- he was last seen by Dr. Lokesh Adames on this date who planned for surveillance scans in October 2016. 10/07/2016- CT scan of the chest/abdomen and pelvis did not show any evidence of metastatic disease, compatible with ongoing complete clinical remission   2/2/2017 PET/CT scan requested and Insurance denied. 3/16/2017- PET/CT scan requested but declined again. 4/18/2017 CT scan of the abdomen and pelvis-showed a stable 1.6 cm retroperitoneal adenopathy. No new evidence of metastatic disease within the abdomen pelvis. 11/29/2017CT chest/abdomen/pelvis showed no evidence of metastatic disease, compatible with ongoing complete clinical remission. 11/26/2018CT chest, abdomen, pelvis unremarkable for recurrent disease. 3/30/2020-Ct Chest with contrast A stable CT scan of the chest. No evidence of a neoplastic/metastatic disease. Severe atheromatous changes of coronary arteries similar to the previous study. No evidence of mediastinal or intrathoracic adenopathy. 3/30/2020CT abdomen pelvis with contrast showed 21 x 17 mm aortocaval lymph node adjacent to the right nephrectomy bed. 4/3/2020- Pet scan showed metastatic lymphadenopathy in the right paraspinal level at T12 and in the aortocaval region. Maximum SUV is in the aortocaval lymph node and measures 14.2. 2. Indeterminate precarinal lymph node demonstrating a maximum SUV of 4.2. This can be followed with subsequent exams. 4/24/2020 EGD/EUS at [facility] and FNA biopsy consistent with recurrent metastatic urothelial carcinoma. IHC positive for PAX-8, PAULY-3 and AE1/AE3. Insufficient cellularity on cellblock for ancillary molecular studies. 4/29/2020recommended palliative/definitive RT and immunotherapy with pembrolizumab.  5/15/2020unfortunately not a surgical candidate and not a candidate for palliative or definitive RT. Started on immunotherapy with Maurie Essex only. 7/29/2020- Ct Abdomen Pelvis W Contrast Interval decrease in size of an aortocaval lymph node near the right nephrectomy bed. Stable hypodense lesion in the right hepatic lobe. Atherosclerotic disease. Fecal stasis. 12/2/20 Ct Chest W Contrast No acute cardiopulmonary process. No evidence of metastatic disease. Vascular calcifications present aortic arch and coronary arteries. 12/2/20 Ct Abdomen Pelvis W Iv Contrast  No evidence of disease progression. Stable subcentimeter right liver lobe lesion. Stable 7 mm aortocaval lymph node. Right nephrectomy. Borderline prostate size.      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)     Metastatic disease (HCC)     Retinopathy     Urothelial cancer ([facility] [address] 4/29/2013          PAST SURGICAL HISTORY:  Past Surgical History:   Procedure Laterality Date    COLONOSCOPY  2010    Dr Amanda Matias  12/04/2009    LEG SURGERY      PARTIAL NEPHRECTOMY Right 01/05/2012    VASCULAR SURGERY  04- SJS    us guided cannulation of right internal jugular vein, right internal jugular vein single lumen port placement [employer]    WRIST SURGERY Right         SOCIAL HISTORY:  Social History     Socioeconomic History    Marital status:      Spouse name: None    Number of children: None    Years of education: None    Highest education level: None   Occupational History    None   Social Needs    Financial resource strain: None    Food insecurity     Worry: None     Inability: None    Transportation needs     Medical: None     Non-medical: None   Tobacco Use    Smoking status: Former Smoker     Types: Cigars    Smokeless tobacco: Current User Substance and Sexual Activity    Alcohol use: No    Drug use: No    Sexual activity: None   Lifestyle    Physical activity     Days per week: None     Minutes per session: None    Stress: None   Relationships    Social connections     Talks on phone: None     Gets together: None     Attends [employer] witness service: None     Active member of club or organization: None     Attends meetings of clubs or organizations: None     Relationship status: None    Intimate partner violence     Fear of current or ex partner: None     Emotionally abused: None     Physically abused: None     Forced sexual activity: None   Other Topics Concern    None   Social History Narrative    None       FAMILY HISTORY:  Family History   Problem Relation Age of Onset    Cervical Cancer Mother     Kidney Cancer Father         Current Outpatient Medications   Medication Sig Dispense Refill    canagliflozin (INVOKANA) 100 MG TABS tablet Take 100 mg by mouth every morning (before breakfast)      lidocaine-prilocaine (EMLA) 2.5-2.5 % cream Apply topically as needed for Pain Apply topically as needed. 1 Tube 1    amLODIPine (NORVASC) 5 MG tablet Take 1 tablet by mouth 2 times daily 30 tablet     Multiple Vitamins-Minerals (PRESERVISION AREDS 2) CAPS Take by mouth 2 times daily      insulin glulisine (APIDRA) 100 UNIT/ML injection Inject  into the skin nightly.  insulin detemir (LEVEMIR) 100 UNIT/ML injection Inject  into the skin nightly.  levothyroxine (SYNTHROID) 125 MCG tablet Take 125 mcg by mouth Daily.  simvastatin (ZOCOR) 40 MG tablet Take 20 mg by mouth nightly       cetirizine (ZYRTEC) 10 MG tablet Take 10 mg by mouth daily. No current facility-administered medications for this visit.       Facility-Administered Medications Ordered in Other Visits   Medication Dose Route Frequency Provider Last Rate Last Admin  sodium chloride flush 0.9 % injection 10 mL  10 mL Intravenous PRN Susan Macedo MD   10 mL at 01/22/21 0945    heparin flush 100 UNIT/ML injection 500 Units  500 Units Intracatheter PRN Susan Macedo MD   500 Units at 01/22/21 0946        REVIEW OF SYSTEMS:    Constitutional: no fever, no night sweats, fatigue;   HEENT: no blurring of vision, no double vision, no hearing difficulty, no tinnitus,no ulceration, no dysphagia  Lungs: no cough, no shortness of breath, no wheeze;   CVS: no palpitation, no chest pain,  no shortness of breath;  GI: no abdominal pain, no nausea , no vomiting, no constipation;   RE: no dysuria, frequency and urgency, no hematuria, no kidney stones;   Musculoskeletal: no joint pain, swelling , stiffness;   Endocrine: no polyuria, polydypsia, no cold or heat intolerence; Hematology/lymphatic: no easy brusing or bleeding, no hx of clotting disorder; no peripheral adenopathy. Dermatology: no skin rash, no eczema, no pruritis;   Psychiatry: no depression, no anxiety,no panic attacks, no suicide ideation; Neurology: no syncope, no seizures, no numbness or tingling of hands, no numbness or tingling of feet, no paresis;     PHYSICAL EXAM:    Vitals signs:  BP (!) 140/70   Pulse 76   Temp 97.1 °F (36.2 °C)   Resp 18   Wt 179 lb (81.2 kg)   SpO2 98%   BMI 28.04 kg/m²    Pain scale:0    CONSTITUTIONAL: Alert, appropriate, no acute distress,   EYES: Non icteric, EOM intact, pupils equal round and reactive to light and accommodation. ENT: Oral mucus membranes moist, no oral pharyngeal lesions. External inspection of ears and nose are normal.   NECK: Supple, no masses. No palpable thyroid mass    CHEST/LUNGS: CTA bilaterally, normal respiratory effort   CARDIOVASCULAR: RRR, no murmurs. No lower extremity edema   ABDOMEN: soft non-tender, active bowel sounds, no hepatosplenomegaly. No palpable masses. EXTREMITIES: warm, Full ROM of all fours extremities. No focal weakness. SKIN: warm, dry with no rashes or lesions  LYMPH: No cervical, clavicular, axillary, or inguinal lymphadenopathy  NEUROLOGIC: follows commands, non focal.   PSYCH: mood and affect appropriate. Alert and oriented to time and place and person. Relevant Lab findings/reviewed by me:  Lab Results   Component Value Date    TSH 0.601 01/22/2021   My interpretation:  Lab Results   Component Value Date    WBC 7.14 01/22/2021    HGB 14.3 01/22/2021    HCT 39.3 (L) 01/22/2021    MCV 87.3 01/22/2021     (L) 01/22/2021     Lab Results   Component Value Date    NEUTROABS 5.00 01/22/2021     Lab Results   Component Value Date     (L) 01/22/2021    K 4.4 01/22/2021    CL 99 01/22/2021    CO2 26 01/22/2021    BUN 20 01/22/2021    CREATININE 1.3 (H) 01/22/2021    GLUCOSE 235 (H) 01/22/2021    CALCIUM 9.1 01/22/2021    PROT 7.8 01/22/2021    LABALBU 4.7 01/22/2021    BILITOT 0.5 01/22/2021    ALKPHOS 85 01/22/2021    AST 29 01/22/2021    ALT 19 (L) 01/22/2021    LABGLOM 55 (A) 01/22/2021    GFRAA 56 (A) 12/02/2020    GLOB 3.1 01/22/2021     Mild hypertension: TSH has improved to 0.6. Mild thrombocytopenia. Mild hyponatremia sodium 133. Relevant Imaging studies/reviewed by me:  No results found. ASSESSMENT:    Orders Placed This Encounter   Procedures    CBC Auto Differential     Standing Status:   Future     Number of Occurrences:   1     Standing Expiration Date:   1/22/2022    Comprehensive Metabolic Panel     Standing Status:   Future     Number of Occurrences:   1     Standing Expiration Date:   1/22/2022    TSH without Reflex     Standing Status:   Future     Standing Expiration Date:   1/22/2022      Amanda Snyder was seen today for cancer and chemotherapy. Diagnoses and all orders for this visit:    Urothelial cancer ([facility] [address]  -     CBC Auto Differential; Future  -     Comprehensive Metabolic Panel;  Future Return in about 6 weeks (around 3/5/2021) for See Dr. Loki Jeffries in [address]. Scans for early April      I, Vinay Campos RN, am scribing for Wing Giuliano MD. Electronically signed by Vinay Campos RN on 1/22/2021 at 3:46 PM.   I, Dr Na Stoll, personally performed the services described in this documentation as scribed by Vinay Campos RN in my presence and is both accurate and complete.

## 2021-01-22 ENCOUNTER — HOSPITAL ENCOUNTER (OUTPATIENT)
Dept: INFUSION THERAPY | Age: 66
Discharge: HOME | End: 2021-01-22
Payer: MEDICARE

## 2021-01-22 ENCOUNTER — OFFICE VISIT (OUTPATIENT)
Dept: HEMATOLOGY | Age: 66
End: 2021-01-22
Payer: MEDICARE

## 2021-01-22 VITALS
WEIGHT: 179 LBS | HEART RATE: 76 BPM | TEMPERATURE: 97.1 F | SYSTOLIC BLOOD PRESSURE: 140 MMHG | RESPIRATION RATE: 18 BRPM | OXYGEN SATURATION: 98 % | BODY MASS INDEX: 28.04 KG/M2 | DIASTOLIC BLOOD PRESSURE: 70 MMHG

## 2021-01-22 DIAGNOSIS — Z51.12 ENCOUNTER FOR ANTINEOPLASTIC IMMUNOTHERAPY: ICD-10-CM

## 2021-01-22 DIAGNOSIS — R53.83 OTHER FATIGUE: ICD-10-CM

## 2021-01-22 DIAGNOSIS — C65.1 MALIGNANT NEOPLASM OF RIGHT RENAL PELVIS (HCC): ICD-10-CM

## 2021-01-22 DIAGNOSIS — C68.9 UROTHELIAL CANCER (HCC): Primary | ICD-10-CM

## 2021-01-22 DIAGNOSIS — Z71.89 CARE PLAN DISCUSSED WITH PATIENT: ICD-10-CM

## 2021-01-22 LAB
ALBUMIN SERPL-MCNC: 4.7 G/DL (ref 3.5–5.2)
ALP BLD-CCNC: 85 U/L (ref 40–130)
ALT SERPL-CCNC: 19 U/L (ref 21–72)
ANION GAP SERPL CALCULATED.3IONS-SCNC: 8 MMOL/L (ref 7–19)
AST SERPL-CCNC: 29 U/L (ref 17–59)
BASOPHILS ABSOLUTE: 0.04 K/UL (ref 0.01–0.08)
BASOPHILS RELATIVE PERCENT: 0.6 % (ref 0.1–1.2)
BILIRUB SERPL-MCNC: 0.5 MG/DL (ref 0.2–1.3)
BUN BLDV-MCNC: 20 MG/DL (ref 9–20)
CALCIUM SERPL-MCNC: 9.1 MG/DL (ref 8.4–10.2)
CHLORIDE BLD-SCNC: 99 MMOL/L (ref 98–111)
CO2: 26 MMOL/L (ref 22–29)
CREAT SERPL-MCNC: 1.3 MG/DL (ref 0.6–1.2)
EOSINOPHILS ABSOLUTE: 0.62 K/UL (ref 0.04–0.54)
EOSINOPHILS RELATIVE PERCENT: 8.7 % (ref 0.7–7)
GFR NON-AFRICAN AMERICAN: 55
GLOBULIN: 3.1 G/DL
GLUCOSE BLD-MCNC: 235 MG/DL (ref 74–106)
HCT VFR BLD CALC: 39.3 % (ref 40.1–51)
HEMOGLOBIN: 14.3 G/DL (ref 13.7–17.5)
LYMPHOCYTES ABSOLUTE: 0.91 K/UL (ref 1.18–3.74)
LYMPHOCYTES RELATIVE PERCENT: 12.7 % (ref 19.3–53.1)
MCH RBC QN AUTO: 31.8 PG (ref 25.7–32.2)
MCHC RBC AUTO-ENTMCNC: 36.4 G/DL (ref 32.3–36.5)
MCV RBC AUTO: 87.3 FL (ref 79–92.2)
MONOCYTES ABSOLUTE: 0.57 K/UL (ref 0.24–0.82)
MONOCYTES RELATIVE PERCENT: 8 % (ref 4.7–12.5)
NEUTROPHILS ABSOLUTE: 5 K/UL (ref 1.56–6.13)
NEUTROPHILS RELATIVE PERCENT: 70 % (ref 34–71.1)
PDW BLD-RTO: 12 % (ref 11.6–14.4)
PLATELET # BLD: 136 K/UL (ref 163–337)
PMV BLD AUTO: 9.8 FL (ref 7.4–10.4)
POTASSIUM SERPL-SCNC: 4.4 MMOL/L (ref 3.5–5.1)
RBC # BLD: 4.5 M/UL (ref 4.63–6.08)
SODIUM BLD-SCNC: 133 MMOL/L (ref 137–145)
TOTAL PROTEIN: 7.8 G/DL (ref 6.3–8.2)
TSH SERPL DL<=0.05 MIU/L-ACNC: 0.6 UIU/ML (ref 0.47–4.68)
WBC # BLD: 7.14 K/UL (ref 4.23–9.07)

## 2021-01-22 PROCEDURE — 1123F ACP DISCUSS/DSCN MKR DOCD: CPT | Performed by: INTERNAL MEDICINE

## 2021-01-22 PROCEDURE — 84443 ASSAY THYROID STIM HORMONE: CPT

## 2021-01-22 PROCEDURE — 4004F PT TOBACCO SCREEN RCVD TLK: CPT | Performed by: INTERNAL MEDICINE

## 2021-01-22 PROCEDURE — 96413 CHEMO IV INFUSION 1 HR: CPT

## 2021-01-22 PROCEDURE — G8417 CALC BMI ABV UP PARAM F/U: HCPCS | Performed by: INTERNAL MEDICINE

## 2021-01-22 PROCEDURE — 4040F PNEUMOC VAC/ADMIN/RCVD: CPT | Performed by: INTERNAL MEDICINE

## 2021-01-22 PROCEDURE — 2580000003 HC RX 258: Performed by: INTERNAL MEDICINE

## 2021-01-22 PROCEDURE — 6360000002 HC RX W HCPCS: Performed by: INTERNAL MEDICINE

## 2021-01-22 PROCEDURE — G8484 FLU IMMUNIZE NO ADMIN: HCPCS | Performed by: INTERNAL MEDICINE

## 2021-01-22 PROCEDURE — 99214 OFFICE O/P EST MOD 30 MIN: CPT | Performed by: INTERNAL MEDICINE

## 2021-01-22 PROCEDURE — 3017F COLORECTAL CA SCREEN DOC REV: CPT | Performed by: INTERNAL MEDICINE

## 2021-01-22 PROCEDURE — G8427 DOCREV CUR MEDS BY ELIG CLIN: HCPCS | Performed by: INTERNAL MEDICINE

## 2021-01-22 PROCEDURE — 80053 COMPREHEN METABOLIC PANEL: CPT

## 2021-01-22 PROCEDURE — 85025 COMPLETE CBC W/AUTO DIFF WBC: CPT

## 2021-01-22 RX ORDER — SODIUM CHLORIDE 0.9 % (FLUSH) 0.9 %
10 SYRINGE (ML) INJECTION PRN
Status: CANCELLED | OUTPATIENT
Start: 2021-01-22

## 2021-01-22 RX ORDER — EPINEPHRINE 1 MG/ML
0.3 INJECTION, SOLUTION, CONCENTRATE INTRAVENOUS PRN
Status: CANCELLED | OUTPATIENT
Start: 2021-01-22

## 2021-01-22 RX ORDER — SODIUM CHLORIDE 9 MG/ML
INJECTION, SOLUTION INTRAVENOUS CONTINUOUS
Status: CANCELLED | OUTPATIENT
Start: 2021-01-22

## 2021-01-22 RX ORDER — DIPHENHYDRAMINE HYDROCHLORIDE 50 MG/ML
50 INJECTION INTRAMUSCULAR; INTRAVENOUS ONCE
Status: CANCELLED | OUTPATIENT
Start: 2021-01-22 | End: 2021-01-22

## 2021-01-22 RX ORDER — SODIUM CHLORIDE 0.9 % (FLUSH) 0.9 %
10 SYRINGE (ML) INJECTION PRN
Status: DISCONTINUED | OUTPATIENT
Start: 2021-01-22 | End: 2021-01-23 | Stop reason: HOSPADM

## 2021-01-22 RX ORDER — SODIUM CHLORIDE 9 MG/ML
20 INJECTION, SOLUTION INTRAVENOUS ONCE
Status: CANCELLED | OUTPATIENT
Start: 2021-01-22 | End: 2021-01-22

## 2021-01-22 RX ORDER — METHYLPREDNISOLONE SODIUM SUCCINATE 125 MG/2ML
125 INJECTION, POWDER, LYOPHILIZED, FOR SOLUTION INTRAMUSCULAR; INTRAVENOUS ONCE
Status: CANCELLED | OUTPATIENT
Start: 2021-01-22 | End: 2021-01-22

## 2021-01-22 RX ORDER — MEPERIDINE HYDROCHLORIDE 50 MG/ML
12.5 INJECTION INTRAMUSCULAR; INTRAVENOUS; SUBCUTANEOUS ONCE
Status: CANCELLED | OUTPATIENT
Start: 2021-01-22 | End: 2021-01-22

## 2021-01-22 RX ORDER — SODIUM CHLORIDE 0.9 % (FLUSH) 0.9 %
5 SYRINGE (ML) INJECTION PRN
Status: CANCELLED | OUTPATIENT
Start: 2021-01-22

## 2021-01-22 RX ORDER — HEPARIN SODIUM (PORCINE) LOCK FLUSH IV SOLN 100 UNIT/ML 100 UNIT/ML
500 SOLUTION INTRAVENOUS PRN
Status: CANCELLED | OUTPATIENT
Start: 2021-01-22

## 2021-01-22 RX ORDER — HEPARIN SODIUM (PORCINE) LOCK FLUSH IV SOLN 100 UNIT/ML 100 UNIT/ML
500 SOLUTION INTRAVENOUS PRN
Status: DISCONTINUED | OUTPATIENT
Start: 2021-01-22 | End: 2021-01-23 | Stop reason: HOSPADM

## 2021-01-22 RX ADMIN — HEPARIN 500 UNITS: 100 SYRINGE at 09:46

## 2021-01-22 RX ADMIN — SODIUM CHLORIDE 400 MG: 9 INJECTION, SOLUTION INTRAVENOUS at 09:13

## 2021-01-22 RX ADMIN — SODIUM CHLORIDE, PRESERVATIVE FREE 10 ML: 5 INJECTION INTRAVENOUS at 09:45

## 2021-02-12 ENCOUNTER — TELEPHONE (OUTPATIENT)
Dept: INFUSION THERAPY | Age: 66
End: 2021-02-12

## 2021-02-12 NOTE — TELEPHONE ENCOUNTER
Received a call from Rickey's wife regarding him developing blisters on hos ankle and collar bone. Wife stated that they looked like blood blisters and were not causing the patient any discomfort. Wife sent pictures of the blisters for Dr. Dariana Jimenes to see. Photos shown to Dr. Dariana Jimenes and he recommended for the patient to keep an eye on the areas and if they worsened that he would need to see a dermatologist.  Lynne Huffman with wife regarding Dr. Gerard Oconnor recommendations and wife verbalized understanding.

## 2021-03-03 NOTE — PROGRESS NOTES
Kaela Casillaslucia   1955  3/5/2021     Chief Complaint   Patient presents with    Cancer     Urothelial Cancer       INTERVAL HISTORY/HISTORY OF PRESENT ILLNESS:  Diagnosis   High-grade urothelial carcinoma of the left kidney stage IV, 2010. Recurrence July 2013   Second recurrence October 2015  Treatment summary  1/5/2010right radical nephrectomy   Relapse February 2013-retroperitoneal soft tissue mass. Gemcitabine/cisplatin completed July 2013 with good partial response, followed by RT 5040 cGy retroperitoneal residual disease, completed October 2013 October 2015- chest wall recurrence, treated with RT   May 2020carboplatin/Gemzar x4 cycles and Keytruda followed by [facility] maintenance. Interval history:  The patient is a very pleasant 77years old male who has a history of urothelial carcinoma of the right renal pelvis diagnosed in 2010. He was a stage III at presentation. Unfortunately, he had a recurrence in July 2013 treated with Gemzar/cisplatin with a good partial response and received adjuvant radiation to the retroperitoneal residual disease completed in October 2013. He remained disease-free until October 2015 when he had a chest wall recurrence, biopsy-proven. This was treated with local radiation therapy with a good response. He remained disease-free for another 5 years but unfortunately on follow-up CT scans showed enlarged retroperitoneal lymph nodes. Biopsy at [facility] consistent with recurrent metastatic urothelial carcinoma. The patient was seen by urology/oncology at [facility] and was not deemed a good surgical candidate for resection. He was also seen by radiation oncology and unfortunately he has maximized dose of radiation in the past.  Therefore, the patient was offered palliative treatment with immunotherapy, Keytruda. Carboplatin Gemzar was added and he received 4 cycles. Currently receiving maintenance Keytruda.   He reports new onset of a bullous rash in the lower extremity. It resolved spontaneously. Otherwise, the patient denies any skin rash, GI symptoms. He denies abdominal pain. He has persistent complaints of fatigue. Cancer historyHigh-grade Urothelial carcinoma left renal pelvis  Mr Melissa Keita was seen in initial oncology consultation on 2/2/2017 as a referral by Dr. Jules Pimentel office to establish continuity of care of his history of urothelial carcinoma. 7/1/[id number] right renal cyst measuring 2.9 x 3.4 x 2.8 cm.   12/4/[id number] biopsy of a right kidney mass was consistent with poorly differentiated adenocarcinoma. 2/24/2010the patient was evaluated by Dr. Radha Bustamante for a right renal tumor. 1/5/2010 He underwent a right radical nephrectomy with the findings of a poorly differentiated adenocarcinoma involving the mid pole of the right kidney measures 6.5 cm in greatest dimension. The tumor extended beyond the renal capsule into the perinephric tissues, but did not extend beyond Gerota's fascia. Margins of resection were negative. Perineural invasion present. No lymph nodes were found in the specimen. No renal vein invasion. Right ureter negative for malignancy. Right adrenal gland negative for malignancy. No lymph nodes identified. Final pathologic staging [id number] stage III. IHC staining performed at [facility] and [facility] showed malignant cells to express PAX-8, p 504s and focal , with negative CK 7 and p63. The case was reviewed by Dr. Irving Najjar [facility], where GATA3 was performed and reported as positive in a significant number of cells. Thus, in his consultation report, Dr Ayush Hanley favored urothelial carcinoma over collecting duct carcinoma. 2/24/2010- he was seen by Dr. Hilaria Orosco and offer a consultation at [facility] for clinical trial, but declined. He was placed on surveillance follow-up with surveillance imaging.  He had several follow-up CT scans performed in April 2010, October 2010, January 2011, July 2011, January 2012, August 2012 that were all unremarkable for disease recurrence. ------------------------------ Relapse February 2013--------------------------  2/7/2013CT scan of the abdomen pelvis revealed retroperitoneal soft tissue density behind the inferior vena cava (2.8 x 2.7 cm) increased in size from prior CT scan on 7/27/2012 02/19/2013 PET/CT scan showed a mass extending along the pericaval lymph node chain from T12-L1 level with SUV 4. Extensive pericaval adenopathy (SUV 5.4). 3/27/2013marvin was seen in consultation at [facility]. The retroperitoneal mass could not be resected. He was recommended chemotherapy. He completed palliative chemotherapy with cisplatin/gemcitabine completed on 7/26/2013.   8/7/2013interval improvement consistent with a positive response to chemotherapy. There was a decrease in size and number of the lymph nodes as well as decrease SUV. 10/30/2013he completed a course of RT to the retroperitoneal area receiving a total dose of 5040 cGy to the periaortic aortic lymph nodes. 11/18/2013a CT scan of the abdomen pelvis showed slight diminishment in size in the in nodularity within the retrocaval region. 11/20/2015 He was again seen at [facility] and again resection was not recommended. 10/19/2015a CT scan of the chest/abdomen pelvis showed new area of soft tissue abnormality in the posterior right chest wall. It measured 2.3 cm and was just medial to the right 11th rib. Another 2.7 cm density anterior to the right 12th rib suggest metastatic disease. 11/24/2015- the patient had a biopsy-proven recurrence on the right chest wall compatible with metastatic carcinoma with glandular differentiation, high-grade. Tissue was sent to [facility] and consistent with metastatic poorly differentiated renal cell carcinoma.    Treated with radiation therapy only   12/16/2015- abnormal metabolic activity noted between right 11th rib as described on CT scan for October. Consistent metastatic disease.  -------------- Clinical/radiological remission April 2016 --------------  4/7/2016patient had CT scans of the chest/abdomen/pelvis which did not show any evidence of new disease compatible with complete clinical remission. 6/8/2016- he was last seen by Dr. Etta Funes on this date who planned for surveillance scans in October 2016.   10/07/2016- CT scan of the chest/abdomen and pelvis did not show any evidence of metastatic disease, compatible with ongoing complete clinical remission   2/2/2017 PET/CT scan requested and Insurance denied. 3/16/2017- PET/CT scan requested but declined again. 4/18/2017 CT scan of the abdomen and pelvis-showed a stable 1.6 cm retroperitoneal adenopathy. No new evidence of metastatic disease within the abdomen pelvis. 11/29/2017CT chest/abdomen/pelvis showed no evidence of metastatic disease, compatible with ongoing complete clinical remission. 11/26/2018CT chest, abdomen, pelvis unremarkable for recurrent disease. 3/30/2020-Ct Chest with contrast A stable CT scan of the chest. No evidence of a neoplastic/metastatic disease. Severe atheromatous changes of coronary arteries similar to the previous study. No evidence of mediastinal or intrathoracic adenopathy. 3/30/2020CT abdomen pelvis with contrast showed 21 x 17 mm aortocaval lymph node adjacent to the right nephrectomy bed. 4/3/2020- Pet scan showed metastatic lymphadenopathy in the right paraspinal level at T12 and in the aortocaval region. Maximum SUV is in the aortocaval lymph node and measures 14.2. 2. Indeterminate precarinal lymph node demonstrating a maximum SUV of 4.2. This can be followed with subsequent exams. 4/24/2020 EGD/EUS at [facility] and FNA biopsy consistent with recurrent metastatic urothelial carcinoma. IHC positive for PAX-8, PAULY-3 and AE1/AE3. Insufficient cellularity on cellblock for ancillary molecular studies. 4/29/2020recommended palliative/definitive RT and immunotherapy with pembrolizumab.  5/15/2020unfortunately not a surgical candidate and not a candidate for palliative or definitive RT. Started on immunotherapy with Hephzibah Adelfo only. 7/29/2020- Ct Abdomen Pelvis W Contrast Interval decrease in size of an aortocaval lymph node near the right nephrectomy bed. Stable hypodense lesion in the right hepatic lobe. Atherosclerotic disease. Fecal stasis. 12/2/20 Ct Chest W Contrast No acute cardiopulmonary process. No evidence of metastatic disease. Vascular calcifications present aortic arch and coronary arteries. 12/2/20 Ct Abdomen Pelvis W Iv Contrast  No evidence of disease progression. Stable subcentimeter right liver lobe lesion. Stable 7 mm aortocaval lymph node. Right nephrectomy. Borderline prostate size.      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)     Metastatic disease (HCC)     Retinopathy     Urothelial cancer ([facility] [address] 4/29/2013          PAST SURGICAL HISTORY:  Past Surgical History:   Procedure Laterality Date    COLONOSCOPY  2010    Dr Senthil Bradley  12/04/2009    LEG SURGERY      PARTIAL NEPHRECTOMY Right 01/05/2012    VASCULAR SURGERY  04- [facility]    us guided cannulation of right internal jugular vein, right internal jugular vein single lumen port placement [employer]    WRIST SURGERY Right         SOCIAL HISTORY:  Social History     Socioeconomic History    Marital status:      Spouse name: None    Number of children: None    Years of education: None    Highest education level: None   Occupational History    None   Social Needs    Financial resource strain: None   [employer] current facility-administered medications for this visit. Facility-Administered Medications Ordered in Other Visits   Medication Dose Route Frequency Provider Last Rate Last Admin    pembrolizumab (KEYTRUDA) 400 mg in sodium chloride 0.9 % 100 mL chemo IVPB  400 mg Intravenous Once Joelle Moreira MD        sodium chloride flush 0.9 % injection 10 mL  10 mL Intravenous PRN Joelle Moreira MD        heparin flush 100 UNIT/ML injection 500 Units  500 Units Intracatheter PRN Joelle Moreira MD            REVIEW OF SYSTEMS:    Constitutional: no fever, no night sweats, fatigue;   HEENT: no blurring of vision, no double vision, no hearing difficulty, no tinnitus,no ulceration, no dysphagia  Lungs: dry cough, no shortness of breath, no wheeze;   CVS: no palpitation, no chest pain,  no shortness of breath;  GI: no abdominal pain, no nausea , no vomiting, no constipation;   ER: no dysuria, frequency and urgency, no hematuria, no kidney stones;   Musculoskeletal: no joint pain, swelling , stiffness;   Endocrine: no polyuria, polydypsia, no cold or heat intolerence; Hematology/lymphatic: no easy brusing or bleeding, no hx of clotting disorder; no peripheral adenopathy. Dermatology: bullous skin rash, no eczema, no pruritis; blisters on bilateral ankles  Psychiatry: no depression, no anxiety,no panic attacks, no suicide ideation; Neurology: no syncope, no seizures, no numbness or tingling of hands, no numbness or tingling of feet, no paresis;     PHYSICAL EXAM:    Vitals signs:  BP (!) 145/77   Pulse 61   Temp 96.9 °F (36.1 °C)   Ht 5' 7\" (1.702 m)   Wt 173 lb (78.5 kg)   BMI 27.10 kg/m²    Pain scale:0    CONSTITUTIONAL: Alert, appropriate, no acute distress,   EYES: Non icteric, EOM intact, pupils equal round and reactive to light and accommodation. ENT: Oral mucus membranes moist, no oral pharyngeal lesions. External inspection of ears and nose are normal.   NECK: Supple, no masses.  No palpable thyroid mass    CHEST/LUNGS: CTA bilaterally, normal respiratory effort   CARDIOVASCULAR: RRR, no murmurs. No lower extremity edema   ABDOMEN: soft non-tender, active bowel sounds, no hepatosplenomegaly. No palpable masses. EXTREMITIES: warm, Full ROM of all fours extremities. No focal weakness. SKIN: warm, dry with no rashes or lesions  LYMPH: No cervical, clavicular, axillary, or inguinal lymphadenopathy  NEUROLOGIC: follows commands, non focal.   PSYCH: mood and affect appropriate. Alert and oriented to time and place and person. Relevant Lab findings/reviewed by me:  Lab Results   Component Value Date    WBC 6.83 03/05/2021    HGB 14.1 03/05/2021    HCT 38.8 (L) 03/05/2021    MCV 87.8 03/05/2021     (L) 03/05/2021     Lab Results   Component Value Date    NEUTROABS 4.73 03/05/2021     Lab Results   Component Value Date     (L) 03/05/2021    K 4.6 03/05/2021    CL 98 03/05/2021    CO2 25 03/05/2021    BUN 22 (H) 03/05/2021    CREATININE 1.3 (H) 03/05/2021    GLUCOSE 281 (H) 03/05/2021    CALCIUM 9.1 03/05/2021    PROT 7.4 03/05/2021    LABALBU 4.5 03/05/2021    BILITOT 0.5 03/05/2021    ALKPHOS 91 03/05/2021    AST 28 03/05/2021    ALT 16 (L) 03/05/2021    LABGLOM 55 (A) 03/05/2021    GFRAA 56 (A) 12/02/2020    GLOB 2.9 03/05/2021       Relevant Imaging studies/reviewed by me:  No results found.     ASSESSMENT:    Orders Placed This Encounter   Procedures    CT ABDOMEN PELVIS W IV CONTRAST Additional Contrast? Oral     Standing Status:   Future     Standing Expiration Date:   9/5/2021     Order Specific Question:   Additional Contrast?     Answer:   Oral    CT CHEST W CONTRAST     Standing Status:   Future     Standing Expiration Date:   5/5/2021    CBC Auto Differential     Standing Status:   Future     Number of Occurrences:   1     Standing Expiration Date:   3/5/2022    Comprehensive Metabolic Panel     Standing Status:   Future     Number of Occurrences:   1     Standing visit  · RTC MD 6 weeks  · Topical steroid hydrocortisone prescribed. Follow Up:     Return in about 6 weeks (around 4/16/2021) for Treatment & see Rivera Colón in [facility]. Scans in 5 weeks, prior to next visit      I, Candi Fierro, am scribing for Odette Salcedo MD. Electronically signed by Candi Fierro RN on 3/5/2021 at 5:19 PM CST. I, Dr Mayra Rees, personally performed the services described in this documentation as scribed by Candi Fierro RN in my presence and is both accurate and complete. I have seen, examined and reviewed this patient medication list, appropriate labs and imaging studies. I reviewed relevant medical records and others physicians notes. I discussed the plans of care with the patient. I answered all the questions to the patients satisfaction. I have also reviewed the chief complaint (CC) and part of the history (History of Present Illness (HPI), Past Family Social History [facility]), or Review of Systems (ROS) and made changes when appropriated.        (Please note that portions of this note were completed with a voice recognition program. Efforts were made to edit the dictations but occasionally words are mis-transcribed.)

## 2021-03-05 ENCOUNTER — OFFICE VISIT (OUTPATIENT)
Dept: HEMATOLOGY | Age: 66
End: 2021-03-05
Payer: MEDICARE

## 2021-03-05 ENCOUNTER — HOSPITAL ENCOUNTER (OUTPATIENT)
Dept: INFUSION THERAPY | Age: 66
Discharge: HOME | End: 2021-03-05
Payer: MEDICARE

## 2021-03-05 VITALS
DIASTOLIC BLOOD PRESSURE: 77 MMHG | BODY MASS INDEX: 27.15 KG/M2 | WEIGHT: 173 LBS | SYSTOLIC BLOOD PRESSURE: 145 MMHG | TEMPERATURE: 96.9 F | HEART RATE: 61 BPM | HEIGHT: 67 IN

## 2021-03-05 DIAGNOSIS — Z51.12 ENCOUNTER FOR ANTINEOPLASTIC IMMUNOTHERAPY: ICD-10-CM

## 2021-03-05 DIAGNOSIS — C65.1 MALIGNANT NEOPLASM OF RIGHT RENAL PELVIS (HCC): ICD-10-CM

## 2021-03-05 DIAGNOSIS — C68.9 UROTHELIAL CANCER (HCC): ICD-10-CM

## 2021-03-05 DIAGNOSIS — E87.1 HYPONATREMIA: ICD-10-CM

## 2021-03-05 DIAGNOSIS — R53.83 OTHER FATIGUE: ICD-10-CM

## 2021-03-05 DIAGNOSIS — Z71.89 CARE PLAN DISCUSSED WITH PATIENT: ICD-10-CM

## 2021-03-05 DIAGNOSIS — C68.9 UROTHELIAL CANCER (HCC): Primary | ICD-10-CM

## 2021-03-05 DIAGNOSIS — R21 BULLOUS RASH: ICD-10-CM

## 2021-03-05 DIAGNOSIS — C65.1 MALIGNANT NEOPLASM OF RIGHT RENAL PELVIS (HCC): Primary | ICD-10-CM

## 2021-03-05 LAB
ALBUMIN SERPL-MCNC: 4.5 G/DL (ref 3.5–5.2)
ALP BLD-CCNC: 91 U/L (ref 40–130)
ALT SERPL-CCNC: 16 U/L (ref 21–72)
ANION GAP SERPL CALCULATED.3IONS-SCNC: 9 MMOL/L (ref 7–19)
AST SERPL-CCNC: 28 U/L (ref 17–59)
BASOPHILS ABSOLUTE: 0.04 K/UL (ref 0.01–0.08)
BASOPHILS RELATIVE PERCENT: 0.6 % (ref 0.1–1.2)
BILIRUB SERPL-MCNC: 0.5 MG/DL (ref 0.2–1.3)
BUN BLDV-MCNC: 22 MG/DL (ref 9–20)
CALCIUM SERPL-MCNC: 9.1 MG/DL (ref 8.4–10.2)
CHLORIDE BLD-SCNC: 98 MMOL/L (ref 98–111)
CO2: 25 MMOL/L (ref 22–29)
CREAT SERPL-MCNC: 1.3 MG/DL (ref 0.6–1.2)
EOSINOPHILS ABSOLUTE: 0.55 K/UL (ref 0.04–0.54)
EOSINOPHILS RELATIVE PERCENT: 8.1 % (ref 0.7–7)
GFR NON-AFRICAN AMERICAN: 55
GLOBULIN: 2.9 G/DL
GLUCOSE BLD-MCNC: 281 MG/DL (ref 74–106)
HCT VFR BLD CALC: 38.8 % (ref 40.1–51)
HEMOGLOBIN: 14.1 G/DL (ref 13.7–17.5)
LYMPHOCYTES ABSOLUTE: 0.88 K/UL (ref 1.18–3.74)
LYMPHOCYTES RELATIVE PERCENT: 12.9 % (ref 19.3–53.1)
MCH RBC QN AUTO: 31.9 PG (ref 25.7–32.2)
MCHC RBC AUTO-ENTMCNC: 36.3 G/DL (ref 32.3–36.5)
MCV RBC AUTO: 87.8 FL (ref 79–92.2)
MONOCYTES ABSOLUTE: 0.63 K/UL (ref 0.24–0.82)
MONOCYTES RELATIVE PERCENT: 9.2 % (ref 4.7–12.5)
NEUTROPHILS ABSOLUTE: 4.73 K/UL (ref 1.56–6.13)
NEUTROPHILS RELATIVE PERCENT: 69.2 % (ref 34–71.1)
PDW BLD-RTO: 12.3 % (ref 11.6–14.4)
PLATELET # BLD: 145 K/UL (ref 163–337)
PMV BLD AUTO: 9.7 FL (ref 7.4–10.4)
POTASSIUM SERPL-SCNC: 4.6 MMOL/L (ref 3.5–5.1)
RBC # BLD: 4.42 M/UL (ref 4.63–6.08)
SODIUM BLD-SCNC: 132 MMOL/L (ref 137–145)
TOTAL PROTEIN: 7.4 G/DL (ref 6.3–8.2)
TSH SERPL DL<=0.05 MIU/L-ACNC: 0.77 UIU/ML (ref 0.47–4.68)
WBC # BLD: 6.83 K/UL (ref 4.23–9.07)

## 2021-03-05 PROCEDURE — 4040F PNEUMOC VAC/ADMIN/RCVD: CPT | Performed by: INTERNAL MEDICINE

## 2021-03-05 PROCEDURE — 80053 COMPREHEN METABOLIC PANEL: CPT

## 2021-03-05 PROCEDURE — G8484 FLU IMMUNIZE NO ADMIN: HCPCS | Performed by: INTERNAL MEDICINE

## 2021-03-05 PROCEDURE — 6360000002 HC RX W HCPCS: Performed by: INTERNAL MEDICINE

## 2021-03-05 PROCEDURE — 96413 CHEMO IV INFUSION 1 HR: CPT

## 2021-03-05 PROCEDURE — 2580000003 HC RX 258: Performed by: INTERNAL MEDICINE

## 2021-03-05 PROCEDURE — 4004F PT TOBACCO SCREEN RCVD TLK: CPT | Performed by: INTERNAL MEDICINE

## 2021-03-05 PROCEDURE — 1123F ACP DISCUSS/DSCN MKR DOCD: CPT | Performed by: INTERNAL MEDICINE

## 2021-03-05 PROCEDURE — 84443 ASSAY THYROID STIM HORMONE: CPT

## 2021-03-05 PROCEDURE — 99214 OFFICE O/P EST MOD 30 MIN: CPT | Performed by: INTERNAL MEDICINE

## 2021-03-05 PROCEDURE — G8427 DOCREV CUR MEDS BY ELIG CLIN: HCPCS | Performed by: INTERNAL MEDICINE

## 2021-03-05 PROCEDURE — G8417 CALC BMI ABV UP PARAM F/U: HCPCS | Performed by: INTERNAL MEDICINE

## 2021-03-05 PROCEDURE — 3017F COLORECTAL CA SCREEN DOC REV: CPT | Performed by: INTERNAL MEDICINE

## 2021-03-05 PROCEDURE — 85025 COMPLETE CBC W/AUTO DIFF WBC: CPT

## 2021-03-05 RX ORDER — METHYLPREDNISOLONE SODIUM SUCCINATE 125 MG/2ML
125 INJECTION, POWDER, LYOPHILIZED, FOR SOLUTION INTRAMUSCULAR; INTRAVENOUS ONCE
Status: CANCELLED | OUTPATIENT
Start: 2021-03-05 | End: 2021-03-05

## 2021-03-05 RX ORDER — HEPARIN SODIUM (PORCINE) LOCK FLUSH IV SOLN 100 UNIT/ML 100 UNIT/ML
500 SOLUTION INTRAVENOUS PRN
Status: CANCELLED | OUTPATIENT
Start: 2021-03-05

## 2021-03-05 RX ORDER — DIPHENHYDRAMINE HYDROCHLORIDE 50 MG/ML
50 INJECTION INTRAMUSCULAR; INTRAVENOUS ONCE
Status: CANCELLED | OUTPATIENT
Start: 2021-03-05 | End: 2021-03-05

## 2021-03-05 RX ORDER — MEPERIDINE HYDROCHLORIDE 50 MG/ML
12.5 INJECTION INTRAMUSCULAR; INTRAVENOUS; SUBCUTANEOUS ONCE
Status: CANCELLED | OUTPATIENT
Start: 2021-03-05 | End: 2021-03-05

## 2021-03-05 RX ORDER — SODIUM CHLORIDE 9 MG/ML
INJECTION, SOLUTION INTRAVENOUS CONTINUOUS
Status: CANCELLED | OUTPATIENT
Start: 2021-03-05

## 2021-03-05 RX ORDER — EPINEPHRINE 1 MG/ML
0.3 INJECTION, SOLUTION, CONCENTRATE INTRAVENOUS PRN
Status: CANCELLED | OUTPATIENT
Start: 2021-03-05

## 2021-03-05 RX ORDER — SODIUM CHLORIDE 9 MG/ML
20 INJECTION, SOLUTION INTRAVENOUS ONCE
Status: CANCELLED | OUTPATIENT
Start: 2021-03-05 | End: 2021-03-05

## 2021-03-05 RX ORDER — SODIUM CHLORIDE 0.9 % (FLUSH) 0.9 %
10 SYRINGE (ML) INJECTION PRN
Status: DISCONTINUED | OUTPATIENT
Start: 2021-03-05 | End: 2021-03-06 | Stop reason: HOSPADM

## 2021-03-05 RX ORDER — SODIUM CHLORIDE 0.9 % (FLUSH) 0.9 %
5 SYRINGE (ML) INJECTION PRN
Status: CANCELLED | OUTPATIENT
Start: 2021-03-05

## 2021-03-05 RX ORDER — HEPARIN SODIUM (PORCINE) LOCK FLUSH IV SOLN 100 UNIT/ML 100 UNIT/ML
500 SOLUTION INTRAVENOUS PRN
Status: DISCONTINUED | OUTPATIENT
Start: 2021-03-05 | End: 2021-03-06 | Stop reason: HOSPADM

## 2021-03-05 RX ORDER — SODIUM CHLORIDE 0.9 % (FLUSH) 0.9 %
10 SYRINGE (ML) INJECTION PRN
Status: CANCELLED | OUTPATIENT
Start: 2021-03-05

## 2021-03-05 RX ADMIN — SODIUM CHLORIDE 400 MG: 9 INJECTION, SOLUTION INTRAVENOUS at 09:46

## 2021-03-05 RX ADMIN — SODIUM CHLORIDE, PRESERVATIVE FREE 10 ML: 5 INJECTION INTRAVENOUS at 10:18

## 2021-03-05 RX ADMIN — HEPARIN 500 UNITS: 100 SYRINGE at 10:18

## 2021-04-07 ENCOUNTER — HOSPITAL ENCOUNTER (OUTPATIENT)
Dept: CT IMAGING | Age: 66
Discharge: HOME | End: 2021-04-07
Payer: MEDICARE

## 2021-04-07 DIAGNOSIS — C68.9 UROTHELIAL CANCER (HCC): ICD-10-CM

## 2021-04-07 PROCEDURE — 6360000004 HC RX CONTRAST MEDICATION: Performed by: INTERNAL MEDICINE

## 2021-04-07 PROCEDURE — 74177 CT ABD & PELVIS W/CONTRAST: CPT

## 2021-04-07 PROCEDURE — 71260 CT THORAX DX C+: CPT

## 2021-04-07 RX ADMIN — IOPAMIDOL 60 ML: 755 INJECTION, SOLUTION INTRAVENOUS at 08:23

## 2021-04-15 NOTE — PROGRESS NOTES
extended beyond the renal capsule into the perinephric tissues, but did not extend beyond Gerota's fascia. Margins of resection were negative. Perineural invasion present. No lymph nodes were found in the specimen. No renal vein invasion. Right ureter negative for malignancy. Right adrenal gland negative for malignancy. No lymph nodes identified. Final pathologic staging [id number] stage III. IHC staining performed at [facility] and [facility] showed malignant cells to express PAX-8, p 504s and focal , with negative CK 7 and p63. The case was reviewed by Dr. Pressley Barthel [facility], where GATA3 was performed and reported as positive in a significant number of cells. Thus, in his consultation report, Dr Chidi Huerta favored urothelial carcinoma over collecting duct carcinoma. 2/24/2010- he was seen by Dr. Nabor Roche and offer a consultation at [facility] for clinical trial, but declined. He was placed on surveillance follow-up with surveillance imaging. He had several follow-up CT scans performed in April 2010, October 2010, January 2011, July 2011, January 2012, August 2012 that were all unremarkable for disease recurrence. ------------------------------ Relapse February 2013--------------------------  2/7/2013-CT scan of the abdomen pelvis revealed retroperitoneal soft tissue density behind the inferior vena cava (2.8 x 2.7 cm) increased in size from prior CT scan on 7/27/2012 02/19/2013- PET/CT scan showed a mass extending along the pericaval lymph node chain from T12-L1 level with SUV 4. Extensive pericaval adenopathy (SUV 5.4). 3/27/2013-he was seen in consultation at [facility]. The retroperitoneal mass could not be resected. He was recommended chemotherapy. He completed palliative chemotherapy with cisplatin/gemcitabine completed on 7/26/2013.   8/7/2013-interval improvement consistent with a positive response to chemotherapy.  There was a decrease chest/abdomen/pelvis showed no evidence of metastatic disease, compatible with ongoing complete clinical remission. 11/26/2018-CT chest, abdomen, pelvis unremarkable for recurrent disease. 3/30/2020-Ct Chest with contrast A stable CT scan of the chest. No evidence of a neoplastic/metastatic disease. Severe atheromatous changes of coronary arteries similar to the previous study. No evidence of mediastinal or intrathoracic adenopathy. 3/30/2020-CT abdomen pelvis with contrast showed 21 x 17 mm aortocaval lymph node adjacent to the right nephrectomy bed. 4/3/2020- Pet scan showed metastatic lymphadenopathy in the right paraspinal level at T12 and in the aortocaval region. Maximum SUV is in the aortocaval lymph node and measures 14.2. 2. Indeterminate precarinal lymph node demonstrating a maximum SUV of 4.2. This can be followed with subsequent exams. 4/24/2020- EGD/[facility] at [facility] and FNA biopsy consistent with recurrent metastatic urothelial carcinoma. IHC positive for PAX-8, PAULY-3 and AE1/AE3. Insufficient cellularity on cellblock for ancillary molecular studies. 4/29/2020-recommended palliative/definitive RT and immunotherapy with pembrolizumab.  5/15/2020-unfortunately not a surgical candidate and not a candidate for palliative or definitive RT. Started on immunotherapy with Crissy Negrito only. 7/29/2020- Ct Abdomen Pelvis W Contrast Interval decrease in size of an aortocaval lymph node near the right nephrectomy bed. Stable hypodense lesion in the right hepatic lobe. Atherosclerotic disease. Fecal stasis. 12/2/20 Ct Chest W Contrast No acute cardiopulmonary process. No evidence of metastatic disease. Vascular calcifications present aortic arch and coronary arteries. 12/2/20 Ct Abdomen Pelvis W Iv Contrast  No evidence of disease progression. Stable subcentimeter right liver lobe lesion. Stable 7 mm aortocaval lymph node. Right nephrectomy. Borderline prostate size.    4/7/21 Ct Chest W Contrast No evidence of metastatic disease in the chest region. Atheromatous disease of the thoracic aorta and coronary arteries. Heart size upper limits of normal.   4/7/21 Ct Abdomen Pelvis W Iv Contrast  No interval changes. Postsurgical changes related to prior right nephrectomy without new soft tissue density within the surgical bed to suggest locally recurrent disease. Similar hypodensity near the hepatic dome and aortocaval nodule/lymph node. There is no acute osseous pathology. No suspicious lytic or blastic lesion identified. Prior right hip arthroplasty, without hardware loosening or failure identified visualized aspect. Similar cranial screw of the arthroplasty extending beyond the cortex in the gluteal soft tissues. 4/16/2021-I reviewed CT chest abdomen pelvis. No evidence of metastatic disease.     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)     Metastatic disease (HCC)     Retinopathy     Urothelial cancer ([facility] [address] 4/29/2013          PAST SURGICAL HISTORY:  Past Surgical History:   Procedure Laterality Date    COLONOSCOPY  2010    Dr Lakeisha Hu  12/04/2009    LEG SURGERY      PARTIAL NEPHRECTOMY Right 01/05/2012    VASCULAR SURGERY  04- SJS    us guided cannulation of right internal jugular vein, right internal jugular vein single lumen port placement [employer]    WRIST SURGERY Right         SOCIAL HISTORY:  Social History     Socioeconomic History    Marital status:      Spouse name: None    Number of children: None    Years of education: None    Highest education level: None   Occupational History    None   Social Needs    Financial resource strain: None    Food insecurity Worry: None     Inability: None    Transportation needs     Medical: None     Non-medical: None   Tobacco Use    Smoking status: Former Smoker     Types: Cigars    Smokeless tobacco: Current User   Substance and Sexual Activity    Alcohol use: No    Drug use: No    Sexual activity: None   Lifestyle    Physical activity     Days per week: None     Minutes per session: None    Stress: None   Relationships    Social connections     Talks on phone: None     Gets together: None     Attends Religion service: None     Active member of club or organization: None     Attends meetings of clubs or organizations: None     Relationship status: None    Intimate partner violence     Fear of current or ex partner: None     Emotionally abused: None     Physically abused: None     Forced sexual activity: None   Other Topics Concern    None   Social History Narrative    None       FAMILY HISTORY:  Family History   Problem Relation Age of Onset    Cervical Cancer Mother     Kidney Cancer Father         Current Outpatient Medications   Medication Sig Dispense Refill    predniSONE (DELTASONE) 20 MG tablet Take 3 tabs daily  x2 days then take 2 tabs daily x2 days then 1 tab daily x2 days 20 tablet 0    triamcinolone (KENALOG) 0.1 % cream Apply topically 2 times daily. 80 g 5    hydrOXYzine (ATARAX) 25 MG tablet Take 1 tablet by mouth every 6 hours as needed for Itching 60 tablet 3    canagliflozin (INVOKANA) 100 MG TABS tablet Take 100 mg by mouth every morning (before breakfast)      lidocaine-prilocaine (EMLA) 2.5-2.5 % cream Apply topically as needed for Pain Apply topically as needed. 1 Tube 1    amLODIPine (NORVASC) 5 MG tablet Take 1 tablet by mouth 2 times daily 30 tablet     Multiple Vitamins-Minerals (PRESERVISION AREDS 2) CAPS Take by mouth 2 times daily      insulin glulisine (APIDRA) 100 UNIT/ML injection Inject  into the skin nightly.       insulin detemir (LEVEMIR) 100 UNIT/ML injection Inject into the skin nightly.  levothyroxine (SYNTHROID) 125 MCG tablet Take 125 mcg by mouth Daily.  simvastatin (ZOCOR) 40 MG tablet Take 20 mg by mouth nightly       cetirizine (ZYRTEC) 10 MG tablet Take 10 mg by mouth daily. No current facility-administered medications for this visit. Facility-Administered Medications Ordered in Other Visits   Medication Dose Route Frequency Provider Last Rate Last Admin    sodium chloride flush 0.9 % injection 5-40 mL  5-40 mL Intravenous PRN Uma Kaplan MD   10 mL at 04/16/21 0920    heparin flush 100 UNIT/ML injection 500 Units  500 Units Intracatheter PRN Uma Kaplan MD   500 Units at 04/16/21 0920        REVIEW OF SYSTEMS:    Constitutional: no fever, no night sweats, fatigue;   HEENT: no blurring of vision, no double vision, no hearing difficulty, no tinnitus,no ulceration, no dysphagia  Lungs: dry cough, no shortness of breath, no wheeze;   CVS: no palpitation, no chest pain,  no shortness of breath;  GI: no abdominal pain, no nausea , no vomiting, no constipation;   RE: no dysuria, frequency and urgency, no hematuria, no kidney stones;   Musculoskeletal: no joint pain, swelling , stiffness;   Endocrine: no polyuria, polydypsia, no cold or heat intolerence; Hematology/lymphatic: no easy brusing or bleeding, no hx of clotting disorder; no peripheral adenopathy. Dermatology: bullous skin rash, no eczema, no pruritis; blisters on bilateral ankles  Psychiatry: no depression, no anxiety,no panic attacks, no suicide ideation;    Neurology: no syncope, no seizures, no numbness or tingling of hands, no numbness or tingling of feet, no paresis;     PHYSICAL EXAM:    Vitals signs:  BP (!) 168/82   Pulse 77   Temp 98.2 °F (36.8 °C)   Ht 5' 7\" (1.702 m)   Wt 172 lb (78 kg)   BMI 26.94 kg/m²    Pain scale:0    CONSTITUTIONAL: Alert, appropriate, no acute distress,   EYES: Non icteric, EOM intact, pupils equal round and reactive to light and accommodation. ENT: Oral mucus membranes moist, no oral pharyngeal lesions. External inspection of ears and nose are normal.   NECK: Supple, no masses. No palpable thyroid mass    CHEST/LUNGS: CTA bilaterally, normal respiratory effort   CARDIOVASCULAR: RRR, no murmurs. No lower extremity edema   ABDOMEN: soft non-tender, active bowel sounds, no hepatosplenomegaly. No palpable masses. EXTREMITIES: warm, Full ROM of all fours extremities. No focal weakness. SKIN: warm, dry with no rashes or lesions  LYMPH: No cervical, clavicular, axillary, or inguinal lymphadenopathy  NEUROLOGIC: follows commands, non focal.   PSYCH: mood and affect appropriate. Alert and oriented to time and place and person. Relevant Lab findings/reviewed by me:  Lab Results   Component Value Date    TSH 0.580 04/16/2021     Lab Results   Component Value Date    WBC 7.95 04/16/2021    HGB 14.0 04/16/2021    HCT 40.1 04/16/2021    MCV 89.9 04/16/2021     04/16/2021     Lab Results   Component Value Date    NEUTROABS 5.54 04/16/2021     Lab Results   Component Value Date     (L) 04/16/2021    K 4.5 04/16/2021     04/16/2021    CO2 25 04/16/2021    BUN 23 (H) 04/16/2021    CREATININE 1.4 (H) 04/16/2021    GLUCOSE 260 (H) 04/16/2021    CALCIUM 9.0 04/16/2021    PROT 7.4 04/16/2021    LABALBU 4.3 04/16/2021    BILITOT 0.4 04/16/2021    ALKPHOS 102 04/16/2021    AST 32 04/16/2021    ALT 17 (L) 04/16/2021    LABGLOM 50 (A) 04/16/2021    GFRAA 56 (A) 12/02/2020    GLOB 3.1 04/16/2021         Relevant Imaging studies/reviewed by me:  Ct Chest W Contrast    Result Date: 4/7/2021  1. No evidence of metastatic disease in the chest region. 2. Atheromatous disease of the thoracic aorta and coronary arteries. Heart size upper limits of normal. Signed by Dr Isaura Palacios on 4/7/2021 8:35 AM    Ct Abdomen Pelvis W Iv Contrast Additional Contrast? Oral    Result Date: 4/7/2021  1. No interval changes.  2.  Postsurgical changes related to prior right nephrectomy without new soft tissue density within the surgical bed to suggest locally recurrent disease. 3.  Similar hypodensity near the hepatic dome and aortocaval nodule/lymph node. Signed by Dr Manning [facility] on 4/7/2021 8:49 Finding should include: BONES:  There is no acute osseous pathology. No suspicious lytic or blastic lesion identified. Prior right hip arthroplasty, without hardware loosening or failure identified visualized aspect. Similar cranial screw of the arthroplasty extending beyond the cortex in the gluteal soft tissues. Signed by Dr Manning [facility] on 4/7/2021 9:31 AM      ASSESSMENT:    Orders Placed This Encounter   Procedures    CBC Auto Differential     Standing Status:   Future     Number of Occurrences:   1     Standing Expiration Date:   4/16/2022    Comprehensive Metabolic Panel     Standing Status:   Future     Number of Occurrences:   1     Standing Expiration Date:   4/16/2022    TSH without Reflex     Standing Status:   Future     Number of Occurrences:   1     Standing Expiration Date:   4/16/2022    External Referral To Dermatology     Referral Priority:   Urgent     Referral Type:   Eval and Treat     Referral Reason:   Specialty Services Required     Requested Specialty:   Dermatology     Number of Visits Requested:   1      Sean Lockwood was seen today for cancer. Diagnoses and all orders for this visit:    Urothelial cancer ([facility] [address]  -     CBC Auto Differential; Future  -     Comprehensive Metabolic Panel; Future  -     TSH without Reflex; Future  -     predniSONE (DELTASONE) 20 MG tablet; Take 3 tabs daily  x2 days then take 2 tabs daily x2 days then 1 tab daily x2 days  -     triamcinolone (KENALOG) 0.1 % cream; Apply topically 2 times daily. -     hydrOXYzine (ATARAX) 25 MG tablet; Take 1 tablet by mouth every 6 hours as needed for Itching    Other fatigue   -     TSH without Reflex;  Future    Encounter for antineoplastic immunotherapy  -     predniSONE (Keith Arroyo) 20 MG tablet; Take 3 tabs daily  x2 days then take 2 tabs daily x2 days then 1 tab daily x2 days  -     triamcinolone (KENALOG) 0.1 % cream; Apply topically 2 times daily. -     hydrOXYzine (ATARAX) 25 MG tablet; Take 1 tablet by mouth every 6 hours as needed for Itching  -     External Referral To Dermatology    Drug-induced skin rash  -     predniSONE (DELTASONE) 20 MG tablet; Take 3 tabs daily  x2 days then take 2 tabs daily x2 days then 1 tab daily x2 days  -     triamcinolone (KENALOG) 0.1 % cream; Apply topically 2 times daily. -     hydrOXYzine (ATARAX) 25 MG tablet; Take 1 tablet by mouth every 6 hours as needed for Itching  -     External Referral To Dermatology    Care plan discussed with patient    Coordination of complex care    Adverse effect of antineoplastic and immunosuppressive drugs, subsequent encounter    #Urothelial carcinoma upper urinary tract, PDL-1 100%. Target lesion: retroperitoneal LN-now measuring 7 mm  No candidate for further RT. Recommended palliative systemic therapy. Development of blistering disorder likely related to [facility]. Hold Keytruda. Urgent dermatology consultation  Prednisone 60 mg p.o. x2 days followed by 4 mg p.o. x2 days followed by 20 mg p.o. x2 days. High potency topical steroids  Will tentatively resume Keytruda 200 mg IV every 2 weeks during next visit      Clinical/laboratory assessment of toxicity    No well I just called and also looking yearly guidelines and     At risk thyroid disorder-TSH was suppressed 0.6 1/22/2021. Patient currently taking 125mcg levothyroxine. Will recheck TSH l level every 6 weeks. TSH 0.58    CKD stage IIIA-Rickey also has chronic kidney disease stage III. He continues to deny any urinary symptoms to include hematuria. His creatinine is stable at 1.4/GFR 50.     Hypertension-uncontrolled      Hyperglycemia-uncontrolled. Follow-up with PCP     Bullous Rash- likely related to [place]   Urgent referral to dermatology as per NCCN guidelines recommendations  Development of blistering disorder likely related to [facility]. Hold Keytruda. Urgent dermatology consultation  Prednisone 60 mg p.o. x2 days followed by 4 mg p.o. x2 days followed by 20 mg p.o. x2 days. High potency topical steroids      Plan:  · Hold Keytruda today  · Will decrease Keytruda to 200mg every 3 weeks when rash resolves  · CBC, CMP, TSH today  · Follow-up with Dr. Anmol Muñoz for hypertension/hyperglycemia  · Triamcinolone cream 0.1% topically twice a day-sent to pharmacy  · Prednisone 60 mg x2 days, then 40mg x2 days then 20mg x2 days-sent to pharmacy  · Hydroxyzine 25mg every 6 hrs as needed-script sent  · RTC MD 3 weeks in treatment room  · Urgent dermatology consultation    Follow Up:     Return in about 3 weeks (around 5/7/2021) for Treatment & see Lydia Robins in [facility]. Refer to [facility]-Dr. Frankey Brasil has talked to that office      Puja Patel am scribing for Judy Steven MD. Electronically signed by Rosana Garcia RN on 4/16/2021 at 7:24 AM CDT. I, Dr Gricelda Baker, personally performed the services described in this documentation as scribed by Rosana Garcia RN in my presence and is both accurate and complete. I have seen, examined and reviewed this patient medication list, appropriate labs and imaging studies. I reviewed relevant medical records and others physicians notes. I discussed the plans of care with the patient. I answered all the questions to the patients satisfaction. I have also reviewed the chief complaint (CC) and part of the history (History of Present Illness (HPI), Past Family Social History [facility]), or Review of Systems (ROS) and made changes when appropriated.        (Please note that portions of this note were completed with a voice recognition program. Efforts were made to edit the dictations but occasionally words are mis-transcribed.)    Discussed with dermatology office for coordination of care.

## 2021-04-16 ENCOUNTER — HOSPITAL ENCOUNTER (OUTPATIENT)
Dept: INFUSION THERAPY | Age: 66
Discharge: HOME | End: 2021-04-16
Payer: MEDICARE

## 2021-04-16 ENCOUNTER — OFFICE VISIT (OUTPATIENT)
Dept: HEMATOLOGY | Age: 66
End: 2021-04-16
Payer: MEDICARE

## 2021-04-16 VITALS
HEIGHT: 67 IN | WEIGHT: 172 LBS | HEART RATE: 77 BPM | BODY MASS INDEX: 27 KG/M2 | TEMPERATURE: 98.2 F | SYSTOLIC BLOOD PRESSURE: 168 MMHG | DIASTOLIC BLOOD PRESSURE: 82 MMHG

## 2021-04-16 DIAGNOSIS — C68.9 UROTHELIAL CANCER (HCC): Primary | ICD-10-CM

## 2021-04-16 DIAGNOSIS — Z51.12 ENCOUNTER FOR ANTINEOPLASTIC IMMUNOTHERAPY: ICD-10-CM

## 2021-04-16 DIAGNOSIS — Z71.89 COORDINATION OF COMPLEX CARE: ICD-10-CM

## 2021-04-16 DIAGNOSIS — T45.1X5D ADVERSE EFFECT OF ANTINEOPLASTIC AND IMMUNOSUPPRESSIVE DRUGS, SUBSEQUENT ENCOUNTER: ICD-10-CM

## 2021-04-16 DIAGNOSIS — R53.83 OTHER FATIGUE: ICD-10-CM

## 2021-04-16 DIAGNOSIS — L27.0 DRUG-INDUCED SKIN RASH: ICD-10-CM

## 2021-04-16 DIAGNOSIS — I87.8 POOR VENOUS ACCESS: ICD-10-CM

## 2021-04-16 DIAGNOSIS — Z71.89 CARE PLAN DISCUSSED WITH PATIENT: ICD-10-CM

## 2021-04-16 LAB
ALBUMIN SERPL-MCNC: 4.3 G/DL (ref 3.5–5.2)
ALP BLD-CCNC: 102 U/L (ref 40–130)
ALT SERPL-CCNC: 17 U/L (ref 21–72)
ANION GAP SERPL CALCULATED.3IONS-SCNC: 10 MMOL/L (ref 7–19)
AST SERPL-CCNC: 32 U/L (ref 17–59)
BASOPHILS ABSOLUTE: 0.04 K/UL (ref 0.01–0.08)
BASOPHILS RELATIVE PERCENT: 0.5 % (ref 0.1–1.2)
BILIRUB SERPL-MCNC: 0.4 MG/DL (ref 0.2–1.3)
BUN BLDV-MCNC: 23 MG/DL (ref 9–20)
CALCIUM SERPL-MCNC: 9 MG/DL (ref 8.4–10.2)
CHLORIDE BLD-SCNC: 101 MMOL/L (ref 98–111)
CO2: 25 MMOL/L (ref 22–29)
CREAT SERPL-MCNC: 1.4 MG/DL (ref 0.6–1.2)
EOSINOPHILS ABSOLUTE: 0.85 K/UL (ref 0.04–0.54)
EOSINOPHILS RELATIVE PERCENT: 10.7 % (ref 0.7–7)
GFR NON-AFRICAN AMERICAN: 50
GLOBULIN: 3.1 G/DL
GLUCOSE BLD-MCNC: 260 MG/DL (ref 74–106)
HCT VFR BLD CALC: 40.1 % (ref 40.1–51)
HEMOGLOBIN: 14 G/DL (ref 13.7–17.5)
LYMPHOCYTES ABSOLUTE: 0.77 K/UL (ref 1.18–3.74)
LYMPHOCYTES RELATIVE PERCENT: 9.7 % (ref 19.3–53.1)
MCH RBC QN AUTO: 31.4 PG (ref 25.7–32.2)
MCHC RBC AUTO-ENTMCNC: 34.9 G/DL (ref 32.3–36.5)
MCV RBC AUTO: 89.9 FL (ref 79–92.2)
MONOCYTES ABSOLUTE: 0.75 K/UL (ref 0.24–0.82)
MONOCYTES RELATIVE PERCENT: 9.4 % (ref 4.7–12.5)
NEUTROPHILS ABSOLUTE: 5.54 K/UL (ref 1.56–6.13)
NEUTROPHILS RELATIVE PERCENT: 69.7 % (ref 34–71.1)
PDW BLD-RTO: 12.3 % (ref 11.6–14.4)
PLATELET # BLD: 167 K/UL (ref 163–337)
PMV BLD AUTO: 9.8 FL (ref 7.4–10.4)
POTASSIUM SERPL-SCNC: 4.5 MMOL/L (ref 3.5–5.1)
RBC # BLD: 4.46 M/UL (ref 4.63–6.08)
SODIUM BLD-SCNC: 136 MMOL/L (ref 137–145)
TOTAL PROTEIN: 7.4 G/DL (ref 6.3–8.2)
TSH SERPL DL<=0.05 MIU/L-ACNC: 0.58 UIU/ML (ref 0.47–4.68)
WBC # BLD: 7.95 K/UL (ref 4.23–9.07)

## 2021-04-16 PROCEDURE — 96523 IRRIG DRUG DELIVERY DEVICE: CPT

## 2021-04-16 PROCEDURE — G8427 DOCREV CUR MEDS BY ELIG CLIN: HCPCS | Performed by: INTERNAL MEDICINE

## 2021-04-16 PROCEDURE — G8417 CALC BMI ABV UP PARAM F/U: HCPCS | Performed by: INTERNAL MEDICINE

## 2021-04-16 PROCEDURE — 80053 COMPREHEN METABOLIC PANEL: CPT

## 2021-04-16 PROCEDURE — 6360000002 HC RX W HCPCS: Performed by: INTERNAL MEDICINE

## 2021-04-16 PROCEDURE — 3017F COLORECTAL CA SCREEN DOC REV: CPT | Performed by: INTERNAL MEDICINE

## 2021-04-16 PROCEDURE — 99211 OFF/OP EST MAY X REQ PHY/QHP: CPT

## 2021-04-16 PROCEDURE — 2580000003 HC RX 258: Performed by: INTERNAL MEDICINE

## 2021-04-16 PROCEDURE — 1123F ACP DISCUSS/DSCN MKR DOCD: CPT | Performed by: INTERNAL MEDICINE

## 2021-04-16 PROCEDURE — 4040F PNEUMOC VAC/ADMIN/RCVD: CPT | Performed by: INTERNAL MEDICINE

## 2021-04-16 PROCEDURE — 99215 OFFICE O/P EST HI 40 MIN: CPT | Performed by: INTERNAL MEDICINE

## 2021-04-16 PROCEDURE — 85025 COMPLETE CBC W/AUTO DIFF WBC: CPT

## 2021-04-16 PROCEDURE — 4004F PT TOBACCO SCREEN RCVD TLK: CPT | Performed by: INTERNAL MEDICINE

## 2021-04-16 PROCEDURE — 84443 ASSAY THYROID STIM HORMONE: CPT

## 2021-04-16 RX ORDER — HEPARIN SODIUM (PORCINE) LOCK FLUSH IV SOLN 100 UNIT/ML 100 UNIT/ML
500 SOLUTION INTRAVENOUS PRN
Status: CANCELLED | OUTPATIENT
Start: 2021-04-16

## 2021-04-16 RX ORDER — PREDNISONE 20 MG/1
TABLET ORAL
Qty: 20 TABLET | Refills: 0 | Status: SHIPPED | OUTPATIENT
Start: 2021-04-16

## 2021-04-16 RX ORDER — SODIUM CHLORIDE 0.9 % (FLUSH) 0.9 %
5-40 SYRINGE (ML) INJECTION PRN
Status: CANCELLED | OUTPATIENT
Start: 2021-04-16

## 2021-04-16 RX ORDER — SODIUM CHLORIDE 0.9 % (FLUSH) 0.9 %
5-40 SYRINGE (ML) INJECTION PRN
Status: DISCONTINUED | OUTPATIENT
Start: 2021-04-16 | End: 2021-04-17 | Stop reason: HOSPADM

## 2021-04-16 RX ORDER — HEPARIN SODIUM (PORCINE) LOCK FLUSH IV SOLN 100 UNIT/ML 100 UNIT/ML
500 SOLUTION INTRAVENOUS PRN
Status: DISCONTINUED | OUTPATIENT
Start: 2021-04-16 | End: 2021-04-17 | Stop reason: HOSPADM

## 2021-04-16 RX ORDER — SODIUM CHLORIDE 9 MG/ML
25 INJECTION, SOLUTION INTRAVENOUS PRN
Status: CANCELLED | OUTPATIENT
Start: 2021-04-16

## 2021-04-16 RX ORDER — HYDROXYZINE HYDROCHLORIDE 25 MG/1
25 TABLET, FILM COATED ORAL EVERY 6 HOURS PRN
Qty: 60 TABLET | Refills: 3 | Status: SHIPPED | OUTPATIENT
Start: 2021-04-16 | End: 2021-04-26

## 2021-04-16 RX ORDER — TRIAMCINOLONE ACETONIDE 1 MG/G
CREAM TOPICAL
Qty: 80 G | Refills: 5 | Status: SHIPPED | OUTPATIENT
Start: 2021-04-16

## 2021-04-16 RX ADMIN — HEPARIN 500 UNITS: 100 SYRINGE at 09:20

## 2021-04-16 RX ADMIN — SODIUM CHLORIDE, PRESERVATIVE FREE 10 ML: 5 INJECTION INTRAVENOUS at 09:20

## 2021-05-05 NOTE — PROGRESS NOTES
Aditya Plummer   1955 5/7/2021     Chief Complaint   Patient presents with    Chemotherapy       INTERVAL HISTORY/HISTORY OF PRESENT ILLNESS:  Diagnosis   High-grade urothelial carcinoma of the left kidney stage IV, 2010. Recurrence July 2013   Second recurrence October 2015    Treatment summary  1/5/2010right radical nephrectomy   Relapse February 2013-retroperitoneal soft tissue mass. Gemcitabine/cisplatin completed July 2013 with good partial response, followed by RT 5040 cGy retroperitoneal residual disease, completed October 2013. October 2015- chest wall recurrence, treated with RT   May 2020carboplatin/Gemzar x4 cycles and Keytruda followed by [facility] maintenance. Interval history:  The patient is a very pleasant 74years old male who has a history of urothelial carcinoma of the right renal pelvis diagnosed in 2010. The patient was diagnosed with blister rash secondary to [facility]. [facility] has been on hold. He was seen by dermatology. He is currently on steroids cream and oral steroids. The rash is improving but still present. He has hyperglycemia and insomnia secondary to steroids. Denies any other complaints otherwise. Lost 5 pounds since last visit. Has been quite active. Cancer historyHigh-grade Urothelial carcinoma left renal pelvis  Mr Papi Capone was seen in initial oncology consultation on 2/2/2017 as a referral by Dr. Eligha Frankel office to establish continuity of care of his history of urothelial carcinoma. 7/1/20090816-[id number] right renal cyst measuring 2.9 x 3.4 x 2.8 cm.   12/4/[id number] biopsy of a right kidney mass was consistent with poorly differentiated adenocarcinoma. 2/24/2010the patient was evaluated by Dr. Faith Rivera for a right renal tumor. 1/5/2010 He underwent a right radical nephrectomy with the findings of a poorly differentiated adenocarcinoma involving the mid pole of the right kidney measures 6.5 cm in greatest dimension.  The tumor extended beyond the renal capsule into the perinephric tissues, but did not extend beyond Gerota's fascia. Margins of resection were negative. Perineural invasion present. No lymph nodes were found in the specimen. No renal vein invasion. Right ureter negative for malignancy. Right adrenal gland negative for malignancy. No lymph nodes identified. Final pathologic staging [id number] stage III. IHC staining performed at [facility] and [facility] showed malignant cells to express PAX-8, p 504s and focal , with negative CK 7 and p63. The case was reviewed by Dr. Bang Keane [facility], where GATA3 was performed and reported as positive in a significant number of cells. Thus, in his consultation report, Dr Natasha Walsh favored urothelial carcinoma over collecting duct carcinoma. 2/24/2010- he was seen by Dr. Yazan Reveles and offer a consultation at [facility] for clinical trial, but declined. He was placed on surveillance follow-up with surveillance imaging. He had several follow-up CT scans performed in April 2010, October 2010, January 2011, July 2011, January 2012, August 2012 that were all unremarkable for disease recurrence. ------------------------------ Relapse February 2013--------------------------  2/7/2013CT scan of the abdomen pelvis revealed retroperitoneal soft tissue density behind the inferior vena cava (2.8 x 2.7 cm) increased in size from prior CT scan on 7/27/2012 02/19/2013 PET/CT scan showed a mass extending along the pericaval lymph node chain from T12-L1 level with SUV 4. Extensive pericaval adenopathy (SUV 5.4). 3/27/2013he was seen in consultation at [facility]. The retroperitoneal mass could not be resected. He was recommended chemotherapy. He completed palliative chemotherapy with cisplatin/gemcitabine completed on 7/26/2013.   8/7/2013interval improvement consistent with a positive response to chemotherapy.  There was a decrease in size and number of the lymph nodes as well as decrease SUV. 10/30/2013he completed a course of RT to the retroperitoneal area receiving a total dose of 5040 cGy to the periaortic aortic lymph nodes. 11/18/2013a CT scan of the abdomen pelvis showed slight diminishment in size in the in nodularity within the retrocaval region. 11/20/2015 He was again seen at [facility] and again resection was not recommended. 10/19/2015a CT scan of the chest/abdomen pelvis showed new area of soft tissue abnormality in the posterior right chest wall. It measured 2.3 cm and was just medial to the right 11th rib. Another 2.7 cm density anterior to the right 12th rib suggest metastatic disease. 11/24/2015- the patient had a biopsy-proven recurrence on the right chest wall compatible with metastatic carcinoma with glandular differentiation, high-grade. Tissue was sent to [facility] and consistent with metastatic poorly differentiated renal cell carcinoma. Treated with radiation therapy only   12/16/2015- abnormal metabolic activity noted between right 11th rib as described on CT scan for October. Consistent metastatic disease.  -------------- Clinical/radiological remission April 2016 --------------  4/7/2016patient had CT scans of the chest/abdomen/pelvis which did not show any evidence of new disease compatible with complete clinical remission. 6/8/2016- he was last seen by Dr. Lemuel Schafer on this date who planned for surveillance scans in October 2016.   10/07/2016- CT scan of the chest/abdomen and pelvis did not show any evidence of metastatic disease, compatible with ongoing complete clinical remission   2/2/2017 PET/CT scan requested and Insurance denied. 3/16/2017- PET/CT scan requested but declined again. 4/18/2017 CT scan of the abdomen and pelvis-showed a stable 1.6 cm retroperitoneal adenopathy. No new evidence of metastatic disease within the abdomen pelvis.    11/29/2017CT chest/abdomen/pelvis showed no evidence of metastatic disease, compatible with ongoing complete clinical remission. 11/26/2018CT chest, abdomen, pelvis unremarkable for recurrent disease. 3/30/2020-Ct Chest with contrast A stable CT scan of the chest. No evidence of a neoplastic/metastatic disease. Severe atheromatous changes of coronary arteries similar to the previous study. No evidence of mediastinal or intrathoracic adenopathy. 3/30/2020CT abdomen pelvis with contrast showed 21 x 17 mm aortocaval lymph node adjacent to the right nephrectomy bed. 4/3/2020- Pet scan showed metastatic lymphadenopathy in the right paraspinal level at T12 and in the aortocaval region. Maximum SUV is in the aortocaval lymph node and measures 14.2. 2. Indeterminate precarinal lymph node demonstrating a maximum SUV of 4.2. This can be followed with subsequent exams. 4/24/2020 EGD/EUS at [address] [place] [address] and FNA biopsy consistent with recurrent metastatic urothelial carcinoma. IHC positive for PAX-8, PAULY-3 and AE1/AE3. Insufficient cellularity on cellblock for ancillary molecular studies. 4/29/2020recommended palliative/definitive RT and immunotherapy with pembrolizumab.  5/15/2020unfortunately not a surgical candidate and not a candidate for palliative or definitive RT. Started on immunotherapy with Imelda Sill only. 7/29/2020- Ct Abdomen Pelvis W Contrast Interval decrease in size of an aortocaval lymph node near the right nephrectomy bed. Stable hypodense lesion in the right hepatic lobe. Atherosclerotic disease. Fecal stasis. 12/2/20 Ct Chest W Contrast No acute cardiopulmonary process. No evidence of metastatic disease. Vascular calcifications present aortic arch and coronary arteries. 12/2/20 Ct Abdomen Pelvis W Iv Contrast  No evidence of disease progression. Stable subcentimeter right liver lobe lesion. Stable 7 mm aortocaval lymph node. Right nephrectomy. Borderline prostate size.    4/7/21 Ct Chest W Contrast No evidence of metastatic disease in the chest region. Atheromatous disease of the thoracic aorta and coronary arteries. Heart size upper limits of normal.   4/7/21 Ct Abdomen Pelvis W Iv Contrast  No interval changes. Postsurgical changes related to prior right nephrectomy without new soft tissue density within the surgical bed to suggest locally recurrent disease. Similar hypodensity near the hepatic dome and aortocaval nodule/lymph node. There is no acute osseous pathology. No suspicious lytic or blastic lesion identified. Prior right hip arthroplasty, without hardware loosening or failure identified visualized aspect. Similar cranial screw of the arthroplasty extending beyond the cortex in the gluteal soft tissues. 4/16/2021I reviewed CT chest abdomen pelvis. No evidence of metastatic disease.     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)     Metastatic disease (HCC)     Retinopathy     Urothelial cancer ([facility] [address] 4/29/2013          PAST SURGICAL HISTORY:  Past Surgical History:   Procedure Laterality Date    COLONOSCOPY  2010    Dr Claire Zhao  12/04/2009    LEG SURGERY      PARTIAL NEPHRECTOMY Right 01/05/2012    VASCULAR SURGERY  04- SJS    us guided cannulation of right internal jugular vein, right internal jugular vein single lumen port placement [employer]    WRIST SURGERY Right         SOCIAL HISTORY:  Social History     Socioeconomic History    Marital status:      Spouse name: None    Number of children: None    Years of education: None    Highest education level: None   Occupational History    None   Social Needs    Financial resource strain: None    Food insecurity     Worry: None MG tablet Take 20 mg by mouth nightly       cetirizine (ZYRTEC) 10 MG tablet Take 10 mg by mouth daily. No current facility-administered medications for this visit. Facility-Administered Medications Ordered in Other Visits   Medication Dose Route Frequency Provider Last Rate Last Admin    sodium chloride flush 0.9 % injection 5-40 mL  5-40 mL Intravenous PRN Shade Scott MD        heparin flush 100 UNIT/ML injection 500 Units  500 Units Intracatheter PRN Shade Scott MD            REVIEW OF SYSTEMS:    Constitutional: no fever, no night sweats, fatigue; 5 pound weight loss  HEENT: no blurring of vision, no double vision, no hearing difficulty, no tinnitus,no ulceration, no dysphagia  Lungs: no cough, no shortness of breath, no wheeze;   CVS: no palpitation, no chest pain,  no shortness of breath;  GI: no abdominal pain, no nausea , no vomiting, no constipation;   RE: no dysuria, frequency and urgency, no hematuria, no kidney stones;   Musculoskeletal: no joint pain, swelling , stiffness;   Endocrine: no polyuria, polydypsia, no cold or heat intolerence; Hematology/lymphatic: no easy brusing or bleeding, no hx of clotting disorder; no peripheral adenopathy. Dermatology: bullous skin rash, no eczema, no pruritis; blisters on bilateral ankles resolving  Psychiatry: no depression, no anxiety,no panic attacks, no suicide ideation; Neurology: no syncope, no seizures, no numbness or tingling of hands, no numbness or tingling of feet, no paresis;     PHYSICAL EXAM:    Vitals signs:  BP (!) 145/73   Pulse 67   Temp 97.5 °F (36.4 °C)   Ht 5' 7\" (1.702 m)   Wt 168 lb 11.2 oz (76.5 kg)   SpO2 100%   BMI 26.42 kg/m²    Pain scale:0    CONSTITUTIONAL: Alert, appropriate, no acute distress,   EYES: Non icteric, EOM intact, pupils equal round and reactive to light and accommodation. ENT: Oral mucus membranes moist, no oral pharyngeal lesions.  External inspection of ears and nose are normal.   NECK: Supple, no masses. No palpable thyroid mass    CHEST/LUNGS: CTA bilaterally, normal respiratory effort   CARDIOVASCULAR: RRR, no murmurs. No lower extremity edema   ABDOMEN: soft non-tender, active bowel sounds, no hepatosplenomegaly. No palpable masses. EXTREMITIES: warm, Full ROM of all fours extremities. No focal weakness. SKIN: warm, dry with no rashes or lesions  LYMPH: No cervical, clavicular, axillary, or inguinal lymphadenopathy  NEUROLOGIC: follows commands, non focal.   PSYCH: mood and affect appropriate. Alert and oriented to time and place and person. Relevant Lab findings/reviewed by me:  Lab Results   Component Value Date    WBC 8.38 05/07/2021    HGB 13.6 (L) 05/07/2021    HCT 38.4 (L) 05/07/2021    MCV 89.5 05/07/2021     05/07/2021     Lab Results   Component Value Date    NEUTROABS 6.02 05/07/2021     Lab Results   Component Value Date     (L) 05/07/2021    K 3.9 05/07/2021    CL 98 05/07/2021    CO2 27 05/07/2021    BUN 31 (H) 05/07/2021    CREATININE 1.4 (H) 05/07/2021    GLUCOSE 279 (H) 05/07/2021    CALCIUM 9.4 05/07/2021    PROT 7.0 05/07/2021    LABALBU 4.1 05/07/2021    BILITOT 0.5 05/07/2021    ALKPHOS 87 05/07/2021    AST 24 05/07/2021    ALT 16 (L) 05/07/2021    LABGLOM 50 (A) 05/07/2021    GFRAA 56 (A) 12/02/2020    GLOB 2.9 05/07/2021       Relevant Imaging studies/reviewed by me:  No results found. ASSESSMENT:    Orders Placed This Encounter   Procedures    Comprehensive Metabolic Panel     Standing Status:   Future     Number of Occurrences:   1     Standing Expiration Date:   5/7/2022      Yadira Myles was seen today for chemotherapy. Diagnoses and all orders for this visit:    Urothelial cancer ([facility] [address]  -     Comprehensive Metabolic Panel;  Future    Encounter for antineoplastic immunotherapy    Adverse effect of antineoplastic and immunosuppressive drugs, subsequent encounter    Drug-induced blistering disease    Care plan discussed with patient Renal impairment      #Urothelial carcinoma upper urinary tract, PDL-1 100%. Target lesion: retroperitoneal LN-now measuring 7 mm  No candidate for further RT. Patient was receiving Keytruda but developed skin rash. Development of blistering disorder likely related to [facility]. Hold Keytruda. Keytruda on hold secondary to blistering skin rash      Clinical/laboratory assessment of toxicity    No well I just called and also looking yearly guidelines and     At risk thyroid disorderTSH was suppressed 0.6 1/22/2021. Patient currently taking 125mcg levothyroxine. Will recheck TSH l level every 6 weeks. TSH 0.58    CKD stage IIIA-Rickey also has chronic kidney disease stage III. He continues to deny any urinary symptoms to include hematuria. His creatinine is stable at 1.4/GFR 50.     Hypertensionuncontrolled      Hyperglycemiauncontrolled. Follow-up with PCP     Bullous Rash- likely related to [place] Urgent referral to dermatology as per NCCN guidelines recommendations  Development of blistering disorder likely related to [facility]. Hold Keytruda. Patient was seen by dermatologycontinue steroids      Plan:  · Romelle Chroman again today  · Will continue to hold Keytruda to 200mg every 3 weeks when rash resolves  · CBC, CMP today  · Follow-up with Dr. Fely Joiner for hypertension/hyperglycemia  · Continue Prednisone   · Continue Triamcinolone cream  · RTC MD 4 weeks in treatment room  · Follow up with dermatology    Follow Up:     Return in about 4 weeks (around 6/4/2021) for See Dr. Matthew Branham in [address]. Data Althea Lal am scribing for Ariadna Delgado MD. Electronically signed by Stanley Dominguez RN on 5/7/2021 at 4:06 PM CDT. I, Dr Frances Aguirre, personally performed the services described in this documentation as scribed by Stanley Dominguez RN in my presence and is both accurate and complete.   I have seen, examined and reviewed this patient medication list, appropriate labs and imaging studies. I reviewed relevant medical records and others physicians notes. I discussed the plans of care with the patient. I answered all the questions to the patients satisfaction. I have also reviewed the chief complaint (CC) and part of the history (History of Present Illness (HPI), Past Family Social History [facility]), or Review of Systems (ROS) and made changes when appropriated.        (Please note that portions of this note were completed with a voice recognition program. Efforts were made to edit the dictations but occasionally words are mis-transcribed.)

## 2021-05-07 ENCOUNTER — HOSPITAL ENCOUNTER (OUTPATIENT)
Dept: INFUSION THERAPY | Age: 66
Discharge: HOME | End: 2021-05-07
Payer: MEDICARE

## 2021-05-07 ENCOUNTER — OFFICE VISIT (OUTPATIENT)
Dept: HEMATOLOGY | Age: 66
End: 2021-05-07
Payer: MEDICARE

## 2021-05-07 VITALS
BODY MASS INDEX: 26.48 KG/M2 | HEART RATE: 67 BPM | HEIGHT: 67 IN | TEMPERATURE: 97.5 F | WEIGHT: 168.7 LBS | OXYGEN SATURATION: 100 % | DIASTOLIC BLOOD PRESSURE: 73 MMHG | SYSTOLIC BLOOD PRESSURE: 145 MMHG

## 2021-05-07 DIAGNOSIS — L10.5: ICD-10-CM

## 2021-05-07 DIAGNOSIS — I87.8 POOR VENOUS ACCESS: ICD-10-CM

## 2021-05-07 DIAGNOSIS — C65.1 MALIGNANT NEOPLASM OF RIGHT RENAL PELVIS (HCC): Primary | ICD-10-CM

## 2021-05-07 DIAGNOSIS — T45.1X5D ADVERSE EFFECT OF ANTINEOPLASTIC AND IMMUNOSUPPRESSIVE DRUGS, SUBSEQUENT ENCOUNTER: ICD-10-CM

## 2021-05-07 DIAGNOSIS — Z71.89 CARE PLAN DISCUSSED WITH PATIENT: ICD-10-CM

## 2021-05-07 DIAGNOSIS — C68.9 UROTHELIAL CANCER (HCC): ICD-10-CM

## 2021-05-07 DIAGNOSIS — Z51.12 ENCOUNTER FOR ANTINEOPLASTIC IMMUNOTHERAPY: ICD-10-CM

## 2021-05-07 DIAGNOSIS — C68.9 UROTHELIAL CANCER (HCC): Primary | ICD-10-CM

## 2021-05-07 DIAGNOSIS — N28.9 RENAL IMPAIRMENT: ICD-10-CM

## 2021-05-07 LAB
ALBUMIN SERPL-MCNC: 4.1 G/DL (ref 3.5–5.2)
ALP BLD-CCNC: 87 U/L (ref 40–130)
ALT SERPL-CCNC: 16 U/L (ref 21–72)
ANION GAP SERPL CALCULATED.3IONS-SCNC: 9 MMOL/L (ref 7–19)
AST SERPL-CCNC: 24 U/L (ref 17–59)
BASOPHILS ABSOLUTE: 0.03 K/UL (ref 0.01–0.08)
BASOPHILS RELATIVE PERCENT: 0.4 % (ref 0.1–1.2)
BILIRUB SERPL-MCNC: 0.5 MG/DL (ref 0.2–1.3)
BUN BLDV-MCNC: 31 MG/DL (ref 9–20)
CALCIUM SERPL-MCNC: 9.4 MG/DL (ref 8.4–10.2)
CHLORIDE BLD-SCNC: 98 MMOL/L (ref 98–111)
CO2: 27 MMOL/L (ref 22–29)
CREAT SERPL-MCNC: 1.4 MG/DL (ref 0.6–1.2)
EOSINOPHILS ABSOLUTE: 0.14 K/UL (ref 0.04–0.54)
EOSINOPHILS RELATIVE PERCENT: 1.7 % (ref 0.7–7)
GFR NON-AFRICAN AMERICAN: 50
GLOBULIN: 2.9 G/DL
GLUCOSE BLD-MCNC: 279 MG/DL (ref 74–106)
HCT VFR BLD CALC: 38.4 % (ref 40.1–51)
HEMOGLOBIN: 13.6 G/DL (ref 13.7–17.5)
LYMPHOCYTES ABSOLUTE: 1.42 K/UL (ref 1.18–3.74)
LYMPHOCYTES RELATIVE PERCENT: 16.9 % (ref 19.3–53.1)
MCH RBC QN AUTO: 31.7 PG (ref 25.7–32.2)
MCHC RBC AUTO-ENTMCNC: 35.4 G/DL (ref 32.3–36.5)
MCV RBC AUTO: 89.5 FL (ref 79–92.2)
MONOCYTES ABSOLUTE: 0.77 K/UL (ref 0.24–0.82)
MONOCYTES RELATIVE PERCENT: 9.2 % (ref 4.7–12.5)
NEUTROPHILS ABSOLUTE: 6.02 K/UL (ref 1.56–6.13)
NEUTROPHILS RELATIVE PERCENT: 71.8 % (ref 34–71.1)
PDW BLD-RTO: 12.1 % (ref 11.6–14.4)
PLATELET # BLD: 169 K/UL (ref 163–337)
PMV BLD AUTO: 9.8 FL (ref 7.4–10.4)
POTASSIUM SERPL-SCNC: 3.9 MMOL/L (ref 3.5–5.1)
RBC # BLD: 4.29 M/UL (ref 4.63–6.08)
SODIUM BLD-SCNC: 134 MMOL/L (ref 137–145)
TOTAL PROTEIN: 7 G/DL (ref 6.3–8.2)
WBC # BLD: 8.38 K/UL (ref 4.23–9.07)

## 2021-05-07 PROCEDURE — 2580000003 HC RX 258: Performed by: INTERNAL MEDICINE

## 2021-05-07 PROCEDURE — 4004F PT TOBACCO SCREEN RCVD TLK: CPT | Performed by: INTERNAL MEDICINE

## 2021-05-07 PROCEDURE — 4040F PNEUMOC VAC/ADMIN/RCVD: CPT | Performed by: INTERNAL MEDICINE

## 2021-05-07 PROCEDURE — 3017F COLORECTAL CA SCREEN DOC REV: CPT | Performed by: INTERNAL MEDICINE

## 2021-05-07 PROCEDURE — G8427 DOCREV CUR MEDS BY ELIG CLIN: HCPCS | Performed by: INTERNAL MEDICINE

## 2021-05-07 PROCEDURE — 6360000002 HC RX W HCPCS: Performed by: INTERNAL MEDICINE

## 2021-05-07 PROCEDURE — 36415 COLL VENOUS BLD VENIPUNCTURE: CPT

## 2021-05-07 PROCEDURE — 80053 COMPREHEN METABOLIC PANEL: CPT

## 2021-05-07 PROCEDURE — 85025 COMPLETE CBC W/AUTO DIFF WBC: CPT

## 2021-05-07 PROCEDURE — 96523 IRRIG DRUG DELIVERY DEVICE: CPT

## 2021-05-07 PROCEDURE — G8417 CALC BMI ABV UP PARAM F/U: HCPCS | Performed by: INTERNAL MEDICINE

## 2021-05-07 PROCEDURE — 99214 OFFICE O/P EST MOD 30 MIN: CPT | Performed by: INTERNAL MEDICINE

## 2021-05-07 PROCEDURE — 1123F ACP DISCUSS/DSCN MKR DOCD: CPT | Performed by: INTERNAL MEDICINE

## 2021-05-07 PROCEDURE — 99212 OFFICE O/P EST SF 10 MIN: CPT

## 2021-05-07 RX ORDER — HEPARIN SODIUM (PORCINE) LOCK FLUSH IV SOLN 100 UNIT/ML 100 UNIT/ML
500 SOLUTION INTRAVENOUS PRN
Status: CANCELLED | OUTPATIENT
Start: 2021-05-07

## 2021-05-07 RX ORDER — SODIUM CHLORIDE 0.9 % (FLUSH) 0.9 %
5-40 SYRINGE (ML) INJECTION PRN
Status: DISCONTINUED | OUTPATIENT
Start: 2021-05-07 | End: 2021-05-08 | Stop reason: HOSPADM

## 2021-05-07 RX ORDER — HEPARIN SODIUM (PORCINE) LOCK FLUSH IV SOLN 100 UNIT/ML 100 UNIT/ML
500 SOLUTION INTRAVENOUS PRN
Status: DISCONTINUED | OUTPATIENT
Start: 2021-05-07 | End: 2021-05-08 | Stop reason: HOSPADM

## 2021-05-07 RX ORDER — SODIUM CHLORIDE 0.9 % (FLUSH) 0.9 %
5-40 SYRINGE (ML) INJECTION PRN
Status: CANCELLED | OUTPATIENT
Start: 2021-05-07

## 2021-05-07 RX ORDER — SODIUM CHLORIDE 9 MG/ML
25 INJECTION, SOLUTION INTRAVENOUS PRN
Status: CANCELLED | OUTPATIENT
Start: 2021-05-07

## 2021-05-07 RX ADMIN — SODIUM CHLORIDE, PRESERVATIVE FREE 10 ML: 5 INJECTION INTRAVENOUS at 08:58

## 2021-05-07 RX ADMIN — HEPARIN 500 UNITS: 100 SYRINGE at 08:58

## 2021-06-03 NOTE — PROGRESS NOTES
Patrick Tyson   1955 6/4/2021     Chief Complaint   Patient presents with    Cancer       INTERVAL HISTORY/HISTORY OF PRESENT ILLNESS:  Diagnosis   High-grade urothelial carcinoma of the left kidney stage IV, 2010. Recurrence July 2013   Second recurrence October 2015    Treatment summary  1/5/2010right radical nephrectomy   Relapse February 2013-retroperitoneal soft tissue mass. Gemcitabine/cisplatin completed July 2013 with good partial response, followed by RT 5040 cGy retroperitoneal residual disease, completed October 2013. October 2015- chest wall recurrence, treated with RT   May 2020carboplatin/Gemzar x4 cycles and Keytruda followed by [facility] maintenance. Interval history:  The patient is a very pleasant 74years old male who has a history of urothelial carcinoma of the right renal pelvis diagnosed in 2010. The patient was diagnosed with a blister rash secondary to [facility]. [facility] has been on hold. He was seen by dermatology. Patient is currently seen by dermatology who is managing his blistering rash. The rashes are completely healed. He still on the steroids. he is currently on steroids cream and oral steroids. The steroids are being tapered off. Cancer historyHigh-grade Urothelial carcinoma left renal pelvis  Mr Jacqueline Flores was seen in initial oncology consultation on 2/2/2017 as a referral by Dr. Praneeth Salguero office to establish continuity of care of his history of urothelial carcinoma. 7/1/[id number] right renal cyst measuring 2.9 x 3.4 x 2.8 cm.   12/4/[id number]-LR-webypy biopsy of a right kidney mass was consistent with poorly differentiated adenocarcinoma. 2/24/2010the patient was evaluated by Dr. Shelley Fox for a right renal tumor. 1/5/2010 He underwent a right radical nephrectomy with the findings of a poorly differentiated adenocarcinoma involving the mid pole of the right kidney measures 6.5 cm in greatest dimension.  The tumor extended beyond the renal capsule nodes as well as decrease SUV. 10/30/2013he completed a course of RT to the retroperitoneal area receiving a total dose of 5040 cGy to the periaortic aortic lymph nodes. 11/18/2013a CT scan of the abdomen pelvis showed slight diminishment in size in the in nodularity within the retrocaval region. 11/20/2015 He was again seen at [facility] and again resection was not recommended. 10/19/2015a CT scan of the chest/abdomen pelvis showed new area of soft tissue abnormality in the posterior right chest wall. It measured 2.3 cm and was just medial to the right 11th rib. Another 2.7 cm density anterior to the right 12th rib suggest metastatic disease. 11/24/2015- the patient had a biopsy-proven recurrence on the right chest wall compatible with metastatic carcinoma with glandular differentiation, high-grade. [facility] was [facility] and consistent with metastatic poorly differentiated renal cell carcinoma. Treated with radiation therapy only   12/16/2015- abnormal metabolic activity noted between right 11th rib as described on CT scan for October. Consistent metastatic disease.  -------------- Clinical/radiological remission April 2016 --------------  4/7/2016patient had CT scans of the chest/abdomen/pelvis which did not show any evidence of new disease compatible with complete clinical remission. 6/8/2016- he was last seen by Dr. Marshall Bueno on this date who planned for surveillance scans in October 2016.   10/07/2016- CT scan of the chest/abdomen and pelvis did not show any evidence of metastatic disease, compatible with ongoing complete clinical remission   2/2/2017 PET/CT scan requested and Insurance denied. 3/16/2017- PET/CT scan requested but declined again. 4/18/2017 CT scan of the abdomen and pelvis-showed a stable 1.6 cm retroperitoneal adenopathy. No new evidence of metastatic disease within the abdomen pelvis.    11/29/2017CT chest/abdomen/pelvis showed no evidence of metastatic disease, compatible with ongoing complete clinical remission. 11/26/2018CT chest, abdomen, pelvis unremarkable for recurrent disease. 3/30/2020-Ct Chest with contrast A stable CT scan of the chest. No evidence of a neoplastic/metastatic disease. Severe atheromatous changes of coronary arteries similar to the previous study. No evidence of mediastinal or intrathoracic adenopathy. 3/30/2020CT abdomen pelvis with contrast showed 21 x 17 mm aortocaval lymph node adjacent to the right nephrectomy bed. 4/3/2020- Pet scan showed metastatic lymphadenopathy in the right paraspinal level at T12 and in the aortocaval region. Maximum SUV is in the aortocaval lymph node and measures 14.2. 2. Indeterminate precarinal lymph node demonstrating a maximum SUV of 4.2. This can be followed with subsequent exams. 4/24/2020 EGD/EUS at [facility] and FNA biopsy consistent with recurrent metastatic urothelial carcinoma. IHC positive for PAX-8, PAULY-3 and AE1/AE3. Insufficient cellularity on cellblock for ancillary molecular studies. 4/29/2020recommended palliative/definitive RT and immunotherapy with pembrolizumab.  5/15/2020unfortunately not a surgical candidate and not a candidate for palliative or definitive RT. Started on immunotherapy with Meli Yamileth only. 7/29/2020- Ct Abdomen Pelvis W Contrast Interval decrease in size of an aortocaval lymph node near the right nephrectomy bed. Stable hypodense lesion in the right hepatic lobe. Atherosclerotic disease. Fecal stasis. 12/2/20 Ct Chest W Contrast No acute cardiopulmonary process. No evidence of metastatic disease. Vascular calcifications present aortic arch and coronary arteries. 12/2/20 Ct Abdomen Pelvis W Iv Contrast  No evidence of disease progression. Stable subcentimeter right liver lobe lesion. Stable 7 mm aortocaval lymph node. Right nephrectomy. Borderline prostate size.    4/7/21 Ct Chest W Contrast No evidence of metastatic disease in the chest region. Atheromatous disease of the thoracic aorta and coronary arteries. Heart size upper limits of normal.   4/7/21 Ct Abdomen Pelvis W Iv Contrast  No interval changes. Postsurgical changes related to prior right nephrectomy without new soft tissue density within the surgical bed to suggest locally recurrent disease. Similar hypodensity near the hepatic dome and aortocaval nodule/lymph node. There is no acute osseous pathology. No suspicious lytic or blastic lesion identified. Prior right hip arthroplasty, without hardware loosening or failure identified visualized aspect. Similar cranial screw of the arthroplasty extending beyond the cortex in the gluteal soft tissues. 4/16/2021I reviewed C his T chest abdomen pelvis. No evidence of metastatic disease.     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)     Metastatic disease (HCC)     Retinopathy     Urothelial cancer ([facility] [address] 4/29/2013          PAST SURGICAL HISTORY:  Past Surgical History:   Procedure Laterality Date    COLONOSCOPY  2010    Dr Sonal Howell  12/04/2009    LEG SURGERY      PARTIAL NEPHRECTOMY Right 01/05/2012    VASCULAR SURGERY  04- [facility]    us guided cannulation of right internal jugular vein, right internal jugular vein single lumen port placement [employer]    WRIST SURGERY Right         SOCIAL HISTORY:  Social History     Socioeconomic History    Marital status:      Spouse name: Not on file    Number of children: Not on file    Years of education: Not on file    Highest education level: Not on file   Occupational History    Not on file   Tobacco Use    Smoking status: Former Smoker     Types: Cigars    Smokeless tobacco: Current User   Substance and Sexual Activity    Alcohol use: No    Drug use: No    Sexual activity: Not on file   Other Topics Concern    Not on file   Social History Narrative    Not on file     Social Determinants of Health     Financial Resource Strain:     Difficulty of Paying Living Expenses:    Food Insecurity:     Worried About Running Out of Food in the Last Year:     [address] in the Last Year:    Transportation Needs:     Lack of Transportation (Medical):  Lack of Transportation (Non-Medical):    Physical Activity:     Days of Exercise per Week:     Minutes of Exercise per Session:    Stress:     Feeling of Stress :    Social Connections:     Frequency of Communication with Friends and Family:     Frequency of Social Gatherings with Friends and Family:     Attends [employer] Services:     Active Member of Clubs or Organizations:     Attends Club or Organization Meetings:     Marital Status:    Intimate Partner Violence:     Fear of Current or Ex-Partner:     Emotionally Abused:     Physically Abused:     Sexually Abused:        FAMILY HISTORY:  Family History   Problem Relation Age of Onset    Cervical Cancer Mother     Kidney Cancer Father         Current Outpatient Medications   Medication Sig Dispense Refill    predniSONE (DELTASONE) 20 MG tablet Take 3 tabs daily  x2 days then take 2 tabs daily x2 days then 1 tab daily x2 days 20 tablet 0    triamcinolone (KENALOG) 0.1 % cream Apply topically 2 times daily. 80 g 5    canagliflozin (INVOKANA) 100 MG TABS tablet Take 100 mg by mouth every morning (before breakfast)      lidocaine-prilocaine (EMLA) 2.5-2.5 % cream Apply topically as needed for Pain Apply topically as needed.  1 Tube 1    amLODIPine (NORVASC) 5 MG tablet Take 1 tablet by mouth 2 times daily 30 tablet     Multiple Vitamins-Minerals (PRESERVISION AREDS 2) CAPS Take by mouth 2 times daily      insulin glulisine (APIDRA) 100 UNIT/ML pupils equal round and reactive to light and accommodation. ENT: Oral mucus membranes moist, no oral pharyngeal lesions. External inspection of ears and nose are normal.   NECK: Supple, no masses. No palpable thyroid mass    CHEST/LUNGS: CTA bilaterally, normal respiratory effort   CARDIOVASCULAR: RRR, no murmurs. No lower extremity edema   ABDOMEN: soft non-tender, active bowel sounds, no hepatosplenomegaly. No palpable masses. EXTREMITIES: warm, Full ROM of all fours extremities. No focal weakness. SKIN: warm, dry with no rashes or lesions  LYMPH: No cervical, clavicular, axillary, or inguinal lymphadenopathy  NEUROLOGIC: follows commands, non focal.   PSYCH: mood and affect appropriate. Alert and oriented to time and place and person. Relevant Lab findings/reviewed by me:  Lab Results   Component Value Date    WBC 8.69 06/04/2021    HGB 13.7 06/04/2021    HCT 39.7 (L) 06/04/2021    MCV 90.8 06/04/2021     (L) 06/04/2021     Lab Results   Component Value Date    NEUTROABS 6.68 (H) 06/04/2021     Lab Results   Component Value Date     (L) 06/04/2021    K 4.1 06/04/2021    CL 99 06/04/2021    CO2 24 06/04/2021    BUN 34 (H) 06/04/2021    CREATININE 1.5 (H) 06/04/2021    GLUCOSE 289 (H) 06/04/2021    CALCIUM 9.2 06/04/2021    PROT 6.9 06/04/2021    LABALBU 4.0 06/04/2021    BILITOT 0.6 06/04/2021    ALKPHOS 74 06/04/2021    AST 24 06/04/2021    ALT 16 (L) 06/04/2021    LABGLOM 47 (A) 06/04/2021    GFRAA 56 (A) 12/02/2020    GLOB 2.9 06/04/2021       Relevant Imaging studies/reviewed by me:  No results found.       ASSESSMENT:    Orders Placed This Encounter   Procedures    CT CHEST W CONTRAST     Standing Status:   Future     Standing Expiration Date:   6/4/2022    CT ABDOMEN PELVIS W IV CONTRAST Additional Contrast? Oral     Standing Status:   Future     Standing Expiration Date:   6/4/2022     Order Specific Question:   Additional Contrast?     Answer:   Oral    CBC Auto Differential Standing Status:   Future     Standing Expiration Date:   6/4/2022    Comprehensive Metabolic Panel     Standing Status:   Future     Number of Occurrences:   1     Standing Expiration Date:   6/4/2022      Robina Hobson was seen today for cancer. Diagnoses and all orders for this visit:    Encounter for antineoplastic immunotherapy  -     CBC Auto Differential; Future  -     Comprehensive Metabolic Panel; Future    Urothelial cancer ([facility] [address]  -     CT CHEST W CONTRAST; Future  -     CT ABDOMEN PELVIS W IV CONTRAST Additional Contrast? Oral; Future    Adverse effect of antineoplastic and immunosuppressive drugs, subsequent encounter    Drug-induced blistering disease    Care plan discussed with patient      #Urothelial carcinoma upper urinary tract, PDL-1 100%. Target lesion: retroperitoneal LN-now measuring 7 mm  No candidate for further RT. Patient was receiving Keytruda but developed skin rash. Development of blistering disorder likely related to [place]. Continue to hold [place] secondary to blistering skin rash    Clinical/laboratory assessment of toxicity         At risk thyroid disorderTSH was suppressed 0.6 1/22/2021. Patient currently taking 125mcg levothyroxine. Will recheck TSH l level every 6 weeks. TSH 0.58    CKD stage IIIA-Rickey also has chronic kidney disease stage III. He continues to deny any urinary symptoms to include hematuria. His creatinine is stable at 1.5/GFR 47.     Hypertensionuncontrolled      Hyperglycemiauncontrolled. Glucose 289, on steroid taper. Follow-up with PCP      Bullous Rash- likely related to [place] Resolved and healed. Urgent referral to dermatology as per NCCN guidelines recommendations. Development of blistering disorder likely related to [place]. Continue to hold Keytruda.   Patient was seen by dermatologycontinue steroid taper.      Plan:  · Calvin Stahl again today  · Will continue to hold Keytruda to 200mg every 3 weeks until rash resolves and finish steroid taper  · CBC, CMP today  · Follow-up with Dr. Puckett Headings for hypertension/hyperglycemia  · Continue Prednisone   · Continue Triamcinolone cream  · Repeat CT chest/abd/pelvis end of July  · RTC MD in August  · Follow up with dermatology    Follow Up:     Return in about 9 weeks (around 8/6/2021) for See Dr. Adriano Grace. Scans at the end of July, see  first week of August      I, Phan Pat, am scribing for Yanelis Faust MD. Electronically signed by Phan Pat RN on 6/4/2021 at 3:49 PM CDT. I, Dr Anil Adams, personally performed the services described in this documentation as scribed by Phan Pat RN in my presence and is both accurate and complete. I have seen, examined and reviewed this patient medication list, appropriate labs and imaging studies. I reviewed relevant medical records and others physicians notes. I discussed the plans of care with the patient. I answered all the questions to the patients satisfaction. I have also reviewed the chief complaint (CC) and part of the history (History of Present Illness (HPI), Past Family Social History [facility]), or Review of Systems (ROS) and made changes when appropriated.        (Please note that portions of this note were completed with a voice recognition program. Efforts were made to edit the dictations but occasionally words are mis-transcribed.)

## 2021-06-04 ENCOUNTER — OFFICE VISIT (OUTPATIENT)
Dept: HEMATOLOGY | Age: 66
End: 2021-06-04
Payer: MEDICARE

## 2021-06-04 ENCOUNTER — HOSPITAL ENCOUNTER (OUTPATIENT)
Dept: INFUSION THERAPY | Age: 66
Discharge: HOME | End: 2021-06-04
Payer: MEDICARE

## 2021-06-04 DIAGNOSIS — T45.1X5D ADVERSE EFFECT OF ANTINEOPLASTIC AND IMMUNOSUPPRESSIVE DRUGS, SUBSEQUENT ENCOUNTER: ICD-10-CM

## 2021-06-04 DIAGNOSIS — C68.9 UROTHELIAL CANCER (HCC): ICD-10-CM

## 2021-06-04 DIAGNOSIS — I87.8 POOR VENOUS ACCESS: ICD-10-CM

## 2021-06-04 DIAGNOSIS — Z71.89 CARE PLAN DISCUSSED WITH PATIENT: ICD-10-CM

## 2021-06-04 DIAGNOSIS — L10.5: ICD-10-CM

## 2021-06-04 DIAGNOSIS — Z51.12 ENCOUNTER FOR ANTINEOPLASTIC IMMUNOTHERAPY: ICD-10-CM

## 2021-06-04 DIAGNOSIS — Z51.12 ENCOUNTER FOR ANTINEOPLASTIC IMMUNOTHERAPY: Primary | ICD-10-CM

## 2021-06-04 DIAGNOSIS — C65.1 MALIGNANT NEOPLASM OF RIGHT RENAL PELVIS (HCC): Primary | ICD-10-CM

## 2021-06-04 LAB
ALBUMIN SERPL-MCNC: 4 G/DL (ref 3.5–5.2)
ALP BLD-CCNC: 74 U/L (ref 40–130)
ALT SERPL-CCNC: 16 U/L (ref 21–72)
ANION GAP SERPL CALCULATED.3IONS-SCNC: 11 MMOL/L (ref 7–19)
AST SERPL-CCNC: 24 U/L (ref 17–59)
BASOPHILS ABSOLUTE: 0.02 K/UL (ref 0.01–0.08)
BASOPHILS RELATIVE PERCENT: 0.2 % (ref 0.1–1.2)
BILIRUB SERPL-MCNC: 0.6 MG/DL (ref 0.2–1.3)
BUN BLDV-MCNC: 34 MG/DL (ref 9–20)
CALCIUM SERPL-MCNC: 9.2 MG/DL (ref 8.4–10.2)
CHLORIDE BLD-SCNC: 99 MMOL/L (ref 98–111)
CO2: 24 MMOL/L (ref 22–29)
CREAT SERPL-MCNC: 1.5 MG/DL (ref 0.6–1.2)
EOSINOPHILS ABSOLUTE: 0.11 K/UL (ref 0.04–0.54)
EOSINOPHILS RELATIVE PERCENT: 1.3 % (ref 0.7–7)
GFR NON-AFRICAN AMERICAN: 47
GLOBULIN: 2.9 G/DL
GLUCOSE BLD-MCNC: 289 MG/DL (ref 74–106)
HCT VFR BLD CALC: 39.7 % (ref 40.1–51)
HEMOGLOBIN: 13.7 G/DL (ref 13.7–17.5)
LYMPHOCYTES ABSOLUTE: 1.25 K/UL (ref 1.18–3.74)
LYMPHOCYTES RELATIVE PERCENT: 14.4 % (ref 19.3–53.1)
MCH RBC QN AUTO: 31.4 PG (ref 25.7–32.2)
MCHC RBC AUTO-ENTMCNC: 34.5 G/DL (ref 32.3–36.5)
MCV RBC AUTO: 90.8 FL (ref 79–92.2)
MONOCYTES ABSOLUTE: 0.63 K/UL (ref 0.24–0.82)
MONOCYTES RELATIVE PERCENT: 7.2 % (ref 4.7–12.5)
NEUTROPHILS ABSOLUTE: 6.68 K/UL (ref 1.56–6.13)
NEUTROPHILS RELATIVE PERCENT: 76.9 % (ref 34–71.1)
PDW BLD-RTO: 12.5 % (ref 11.6–14.4)
PLATELET # BLD: 140 K/UL (ref 163–337)
PMV BLD AUTO: 9.7 FL (ref 7.4–10.4)
POTASSIUM SERPL-SCNC: 4.1 MMOL/L (ref 3.5–5.1)
RBC # BLD: 4.37 M/UL (ref 4.63–6.08)
SODIUM BLD-SCNC: 134 MMOL/L (ref 137–145)
TOTAL PROTEIN: 6.9 G/DL (ref 6.3–8.2)
WBC # BLD: 8.69 K/UL (ref 4.23–9.07)

## 2021-06-04 PROCEDURE — 3017F COLORECTAL CA SCREEN DOC REV: CPT | Performed by: INTERNAL MEDICINE

## 2021-06-04 PROCEDURE — 99213 OFFICE O/P EST LOW 20 MIN: CPT | Performed by: INTERNAL MEDICINE

## 2021-06-04 PROCEDURE — 80053 COMPREHEN METABOLIC PANEL: CPT

## 2021-06-04 PROCEDURE — 4004F PT TOBACCO SCREEN RCVD TLK: CPT | Performed by: INTERNAL MEDICINE

## 2021-06-04 PROCEDURE — 99211 OFF/OP EST MAY X REQ PHY/QHP: CPT

## 2021-06-04 PROCEDURE — 4040F PNEUMOC VAC/ADMIN/RCVD: CPT | Performed by: INTERNAL MEDICINE

## 2021-06-04 PROCEDURE — 6360000002 HC RX W HCPCS: Performed by: INTERNAL MEDICINE

## 2021-06-04 PROCEDURE — G8417 CALC BMI ABV UP PARAM F/U: HCPCS | Performed by: INTERNAL MEDICINE

## 2021-06-04 PROCEDURE — 2580000003 HC RX 258: Performed by: INTERNAL MEDICINE

## 2021-06-04 PROCEDURE — 96523 IRRIG DRUG DELIVERY DEVICE: CPT

## 2021-06-04 PROCEDURE — 1123F ACP DISCUSS/DSCN MKR DOCD: CPT | Performed by: INTERNAL MEDICINE

## 2021-06-04 PROCEDURE — G8428 CUR MEDS NOT DOCUMENT: HCPCS | Performed by: INTERNAL MEDICINE

## 2021-06-04 PROCEDURE — 85025 COMPLETE CBC W/AUTO DIFF WBC: CPT

## 2021-06-04 RX ORDER — HEPARIN SODIUM (PORCINE) LOCK FLUSH IV SOLN 100 UNIT/ML 100 UNIT/ML
500 SOLUTION INTRAVENOUS PRN
Status: CANCELLED | OUTPATIENT
Start: 2021-06-04

## 2021-06-04 RX ORDER — SODIUM CHLORIDE 0.9 % (FLUSH) 0.9 %
5-40 SYRINGE (ML) INJECTION PRN
Status: CANCELLED | OUTPATIENT
Start: 2021-06-04

## 2021-06-04 RX ORDER — SODIUM CHLORIDE 0.9 % (FLUSH) 0.9 %
5-40 SYRINGE (ML) INJECTION PRN
Status: DISCONTINUED | OUTPATIENT
Start: 2021-06-04 | End: 2021-06-05 | Stop reason: HOSPADM

## 2021-06-04 RX ORDER — HEPARIN SODIUM (PORCINE) LOCK FLUSH IV SOLN 100 UNIT/ML 100 UNIT/ML
500 SOLUTION INTRAVENOUS PRN
Status: DISCONTINUED | OUTPATIENT
Start: 2021-06-04 | End: 2021-06-05 | Stop reason: HOSPADM

## 2021-06-04 RX ORDER — SODIUM CHLORIDE 9 MG/ML
25 INJECTION, SOLUTION INTRAVENOUS PRN
Status: CANCELLED | OUTPATIENT
Start: 2021-06-04

## 2021-06-04 RX ADMIN — SODIUM CHLORIDE, PRESERVATIVE FREE 10 ML: 5 INJECTION INTRAVENOUS at 09:07

## 2021-06-04 RX ADMIN — HEPARIN 500 UNITS: 100 SYRINGE at 09:07

## 2021-07-23 ENCOUNTER — HOSPITAL ENCOUNTER (OUTPATIENT)
Dept: CT IMAGING | Age: 66
Discharge: HOME | End: 2021-07-23
Payer: MEDICARE

## 2021-07-23 DIAGNOSIS — C68.9 UROTHELIAL CANCER (HCC): ICD-10-CM

## 2021-07-23 PROCEDURE — 6360000004 HC RX CONTRAST MEDICATION: Performed by: INTERNAL MEDICINE

## 2021-07-23 PROCEDURE — 74177 CT ABD & PELVIS W/CONTRAST: CPT

## 2021-07-23 PROCEDURE — 71260 CT THORAX DX C+: CPT

## 2021-07-23 RX ADMIN — IOPAMIDOL 60 ML: 755 INJECTION, SOLUTION INTRAVENOUS at 08:46

## 2021-08-03 DIAGNOSIS — C68.9 UROTHELIAL CANCER (HCC): Primary | ICD-10-CM

## 2021-08-04 NOTE — PROGRESS NOTES
MEDICAL ONCOLOGY PROGRESS NOTE      Rogena Meigs   1955 8/6/2021     Chief Complaint   Patient presents with    Follow-up     Encounter for antineoplastic immunotherapy       INTERVAL HISTORY/HISTORY OF PRESENT ILLNESS:  Diagnosis   High-grade urothelial carcinoma of the left kidney stage IV, 2010. Recurrence July 2013   Second recurrence October 2015    Treatment summary  1/5/2010right radical nephrectomy   Relapse February 2013-retroperitoneal soft tissue mass. Gemcitabine/cisplatin completed July 2013 with good partial response, followed by RT 5040 cGy retroperitoneal residual disease, completed October 2013. October 2015- chest wall recurrence, treated with RT   May 2020carboplatin/Gemzar x4 cycles and Keytruda followed by [facility] maintenance. 9/18/20-03/05/21 Maintenance Keytruda  5/7/21- [facility] on hold due to blister rash secondary to [facility]    Interval history:  The patient is a very pleasant 74years old male who has a history of urothelial carcinoma of the right renal pelvis diagnosed in 2010. The patient was diagnosed with a blistering rash secondary to [facility]. [facility] has been on hold since March 2021. He was seen by dermatology. He has been receiving prednisone. The rash got significantly better but upon discontinuation of prednisone he had a recrudescence of the blistering rash. He is going to be seen by Dr. Melonie Beckman next week. Otherwise, he has been doing well. He denies any abdominal symptoms. He has gained about 10 pounds with the steroids. He also had CT chest abdomen pelvis here to discuss results. Cancer historyHigh-grade Urothelial carcinoma left renal pelvis  Mr Steffi Sandhoff was seen in initial oncology consultation on 2/2/2017 as a referral by Dr. Vicky Franklin office to establish continuity of care of his history of urothelial carcinoma.   7/1/[id number] right renal cyst measuring 2.9 x 3.4 x 2.8 cm.   12/4/[id number] biopsy of a right kidney mass was consistent with poorly differentiated adenocarcinoma. 2/24/2010the patient was evaluated by Dr. Jimmy Flor for a right renal tumor. 1/5/2010 He underwent a right radical nephrectomy with the findings of a poorly differentiated adenocarcinoma involving the mid pole of the right kidney measures 6.5 cm in greatest dimension. The tumor extended beyond the renal capsule into the perinephric tissues, but did not extend beyond Gerota's fascia. Margins of resection were negative. Perineural invasion present. No lymph nodes were found in the specimen. No renal vein invasion. Right ureter negative for malignancy. Right adrenal gland negative for malignancy. No lymph nodes identified. Final pathologic staging [id number] stage III. IHC staining performed at [facility] showed malignant cells to express PAX-8, p 504s and focal , with negative CK 7 and p63. The case was reviewed by Dr. Ramo Bar [facility], where GATA3 was performed and reported as positive in a significant number of cells. Thus, in his consultation report, Dr Ling Macedo favored urothelial carcinoma over collecting duct carcinoma. 2/24/2010- he was seen by Dr. Abdirahman Woodward and offer a consultation at [facility] for clinical trial, but declined. He was placed on surveillance follow-up with surveillance imaging. He had several follow-up CT scans performed in April 2010, October 2010, January 2011, July 2011, January 2012, August 2012 that were all unremarkable for disease recurrence. ------------------------------ Relapse February 2013--------------------------  2/7/2013CT scan of the abdomen pelvis revealed retroperitoneal soft tissue density behind the inferior vena cava (2.8 x 2.7 cm) increased in size from prior CT scan on 7/27/2012 02/19/2013 PET/CT scan showed a mass extending along the pericaval lymph node chain from T12-L1 level with SUV 4. Extensive pericaval adenopathy (SUV 5.4). 3/27/2013he was seen in consultation at [facility]. The retroperitoneal mass could not be resected. He was recommended chemotherapy. He completed palliative chemotherapy with cisplatin/gemcitabine completed on 7/26/2013.   8/7/2013interval improvement consistent with a positive response to chemotherapy. There was a decrease in size and number of the lymph nodes as well as decrease SUV. 10/30/2013he completed a course of RT to the retroperitoneal area receiving a total dose of 5040 cGy to the periaortic aortic lymph nodes. 11/18/2013a CT scan of the abdomen pelvis showed slight diminishment in size in the in nodularity within the retrocaval region. 11/20/2015 He was again seen at [facility] and again resection was not recommended. 10/19/2015a CT scan of the chest/abdomen pelvis showed new area of soft tissue abnormality in the posterior right chest wall. It measured 2.3 cm and was just medial to the right 11th rib. Another 2.7 cm density anterior to the right 12th rib suggest metastatic disease. 11/24/2015- the patient had a biopsy-proven recurrence on the right chest wall compatible with metastatic carcinoma with glandular differentiation, high-grade. Tissue was sent to [facility] and consistent with metastatic poorly differentiated renal cell carcinoma. Treated with radiation therapy only   12/16/2015- abnormal metabolic activity noted between right 11th rib as described on CT scan for October. Consistent metastatic disease.  -------------- Clinical/radiological remission April 2016 --------------  4/7/2016patient had CT scans of the chest/abdomen/pelvis which did not show any evidence of new disease compatible with complete clinical remission.    6/8/2016- he was last seen by Dr. Donell Trent on this date who planned for surveillance scans in October 2016.   10/07/2016- CT scan of the chest/abdomen and pelvis did not show any evidence of metastatic disease, compatible with ongoing complete clinical remission   2/2/2017 PET/CT scan requested and Insurance denied. 3/16/2017- PET/CT scan requested but declined again. 4/18/2017 CT scan of the abdomen and pelvis-showed a stable 1.6 cm retroperitoneal adenopathy. No new evidence of metastatic disease within the abdomen pelvis. 11/29/2017CT chest/abdomen/pelvis showed no evidence of metastatic disease, compatible with ongoing complete clinical remission. 11/26/2018CT chest, abdomen, pelvis unremarkable for recurrent disease. 3/30/2020-Ct Chest with contrast A stable CT scan of the chest. No evidence of a neoplastic/metastatic disease. Severe atheromatous changes of coronary arteries similar to the previous study. No evidence of mediastinal or intrathoracic adenopathy. 3/30/2020CT abdomen pelvis with contrast showed 21 x 17 mm aortocaval lymph node adjacent to the right nephrectomy bed. 4/3/2020- Pet scan showed metastatic lymphadenopathy in the right paraspinal level at T12 and in the aortocaval region. Maximum SUV is in the aortocaval lymph node and measures 14.2. 2. Indeterminate precarinal lymph node demonstrating a maximum SUV of 4.2. This can be followed with subsequent exams. 4/24/2020 EGD/EUS at [facility] and FNA biopsy consistent with recurrent metastatic urothelial carcinoma. IHC positive for PAX-8, PAULY-3 and AE1/AE3. Insufficient cellularity on cellblock for ancillary molecular studies. 4/29/2020recommended palliative/definitive RT and immunotherapy with pembrolizumab.  5/15/2020unfortunately not a surgical candidate and not a candidate for palliative or definitive RT. Started on immunotherapy with [facility] only. 7/29/2020- Ct Abdomen Pelvis W Contrast Interval decrease in size of an aortocaval lymph node near the right nephrectomy bed. Stable hypodense lesion in the right hepatic lobe. Atherosclerotic disease. Fecal stasis. 12/2/20 Ct Chest W Contrast No acute cardiopulmonary process.   No evidence of metastatic disease. Vascular calcifications present aortic arch and coronary arteries. 12/2/20 Ct Abdomen Pelvis W Iv Contrast  No evidence of disease progression. Stable subcentimeter right liver lobe lesion. Stable 7 mm aortocaval lymph node. Right nephrectomy. Borderline prostate size. 4/7/21 Ct Chest W Contrast No evidence of metastatic disease in the chest region. Atheromatous disease of the thoracic aorta and coronary arteries. Heart size upper limits of normal.   4/7/21 Ct Abdomen Pelvis W Iv Contrast  No interval changes. Postsurgical changes related to prior right nephrectomy without new soft tissue density within the surgical bed to suggest locally recurrent disease. Similar hypodensity near the hepatic dome and aortocaval nodule/lymph node. There is no acute osseous pathology. No suspicious lytic or blastic lesion identified. Prior right hip arthroplasty, without hardware loosening or failure identified visualized aspect. Similar cranial screw of the arthroplasty extending beyond the cortex in the gluteal soft tissues. 4/16/2021I reviewed C his T chest abdomen pelvis. No evidence of metastatic disease. 7/23/21 CT CHEST W CONTRAST  Stable appearance of the chest from previous study dated 4/7/2021. No radiographic evidence of metastatic disease to the thorax. 7/23/21 CT ABDOMEN PELVIS W IV CONTRAST  Stable CT of the abdomen and pelvis with evidence of previous right nephrectomy and no findings of intra-abdominal or pelvic metastatic disease. Mild circumferential thickening of the bladder wall likely due to underdistention artifact. Diffuse coronary artery calcified plaque. Moderate volume of calcified plaque within the abdominal aorta and its branches as before. Normal aortic caliber. Normal appendix. Small stable subcentimeter lesion in the liver dome favored to represent a small hemangioma.    8/6/2021essentially, no evidence of metastatic disease    PAST MEDICAL HISTORY: Past Medical History:   Diagnosis Date    Adult BMI 29.0-29.9 kg/sq m     Anxiety     Diabetes mellitus (HCC)     Type 1 Insulin depend    HTN (hypertension)     Hypercholesteremia     Hyperlipidemia     Hypothyroidism     Malignant neoplasm of right renal pelvis (HCC)     Metastatic disease (HCC)     Retinopathy     Urothelial cancer ([facility] [address] 4/29/2013          PAST SURGICAL HISTORY:  Past Surgical History:   Procedure Laterality Date    COLONOSCOPY  2010    Dr Shyala Leal  12/04/2009    LEG SURGERY      PARTIAL NEPHRECTOMY Right 01/05/2012    VASCULAR SURGERY  04- [facility]    us guided cannulation of right internal jugular vein, right internal jugular vein single lumen port placement [employer]    WRIST SURGERY Right         SOCIAL HISTORY:  Social History     Socioeconomic History    Marital status:      Spouse name: None    Number of children: None    Years of education: None    Highest education level: None   Occupational History    None   Tobacco Use    Smoking status: Former Smoker     Types: Cigars    Smokeless tobacco: Current User   Substance and Sexual Activity    Alcohol use: No    Drug use: No    Sexual activity: None   Other Topics Concern    None   Social History Narrative    None     Social Determinants of Health     Financial Resource Strain:     Difficulty of Paying Living Expenses:    Food Insecurity:     Worried About Running Out of Food in the Last Year:     Ran Out of Food in the Last Year:    Transportation Needs:     Lack of Transportation (Medical):      Lack of Transportation (Non-Medical):    Physical Activity:     Days of Exercise per Week:     Minutes of Exercise per Session:    Stress:     Feeling of Stress :    Social Connections:     Frequency of Communication with Friends and Family:     Frequency of Social Gatherings with Friends and Family:     Attends Congregational Services:     Active Member of Clubs or Organizations:     Attends Club or Organization Meetings:     Marital Status:    Intimate Partner Violence:     Fear of Current or Ex-Partner:     Emotionally Abused:     Physically Abused:     Sexually Abused:        FAMILY HISTORY:  Family History   Problem Relation Age of Onset    Cervical Cancer Mother     Kidney Cancer Father         Current Outpatient Medications   Medication Sig Dispense Refill    triamcinolone (KENALOG) 0.1 % cream Apply topically 2 times daily. 80 g 5    canagliflozin (INVOKANA) 100 MG TABS tablet Take 100 mg by mouth every morning (before breakfast)      lidocaine-prilocaine (EMLA) 2.5-2.5 % cream Apply topically as needed for Pain Apply topically as needed. 1 Tube 1    amLODIPine (NORVASC) 5 MG tablet Take 1 tablet by mouth 2 times daily 30 tablet     Multiple Vitamins-Minerals (PRESERVISION AREDS 2) CAPS Take by mouth 2 times daily      insulin glulisine (APIDRA) 100 UNIT/ML injection Inject  into the skin nightly.  insulin detemir (LEVEMIR) 100 UNIT/ML injection Inject  into the skin nightly.  levothyroxine (SYNTHROID) 125 MCG tablet Take 125 mcg by mouth Daily.  simvastatin (ZOCOR) 40 MG tablet Take 20 mg by mouth nightly       cetirizine (ZYRTEC) 10 MG tablet Take 10 mg by mouth daily.  predniSONE (DELTASONE) 20 MG tablet Take 3 tabs daily  x2 days then take 2 tabs daily x2 days then 1 tab daily x2 days (Patient not taking: Reported on 8/6/2021) 20 tablet 0     No current facility-administered medications for this visit.      Facility-Administered Medications Ordered in Other Visits   Medication Dose Route Frequency Provider Last Rate Last Admin    sodium chloride flush 0.9 % injection 5-40 mL  5-40 mL Intravenous PRN Re Motley MD   20 mL at 08/06/21 0949    heparin flush 100 UNIT/ML injection 500 Units  500 Units Intracatheter PRN Re Motley MD   500 Units at 08/06/21 0949        REVIEW OF SYSTEMS: Constitutional: no fever, no night sweats, fatigue;   HEENT: no blurring of vision, no double vision, no hearing difficulty, no tinnitus,no ulceration, no dysphagia  Lungs: no cough, no shortness of breath, no wheeze;   CVS: no palpitation, no chest pain,  no shortness of breath;  GI: no abdominal pain, no nausea , no vomiting, no constipation;   RE: no dysuria, frequency and urgency, no hematuria, no kidney stones;   Musculoskeletal: no joint pain, swelling , stiffness;   Endocrine: no polyuria, polydypsia, no cold or heat intolerence; Hematology/lymphatic: no easy brusing or bleeding, no hx of clotting disorder; no peripheral adenopathy. Dermatology: bullous skin rash healing, no eczema, no pruritis; blisters on bilateral ankles healing  Psychiatry: no depression, no anxiety,no panic attacks, no suicide ideation; Neurology: no syncope, no seizures, no numbness or tingling of hands, no numbness or tingling of feet, no paresis;     PHYSICAL EXAM:    Vitals signs:  /80   Pulse 75   Ht 5' 7\" (1.702 m)   Wt 175 lb 8 oz (79.6 kg)   SpO2 96%   BMI 27.49 kg/m²    Pain scale:0    CONSTITUTIONAL: Alert, appropriate, no acute distress,   EYES: Non icteric, EOM intact, pupils equal round and reactive to light and accommodation. ENT: Oral mucus membranes moist, no oral pharyngeal lesions. External inspection of ears and nose are normal.   NECK: Supple, no masses. No palpable thyroid mass    CHEST/LUNGS: CTA bilaterally, normal respiratory effort   CARDIOVASCULAR: RRR, no murmurs. No lower extremity edema   ABDOMEN: soft non-tender, active bowel sounds, no hepatosplenomegaly. No palpable masses. EXTREMITIES: warm, Full ROM of all fours extremities. No focal weakness. SKIN: warm, dry with no rashes or lesions  LYMPH: No cervical, clavicular, axillary, or inguinal lymphadenopathy  NEUROLOGIC: follows commands, non focal.   PSYCH: mood and affect appropriate.  Alert and oriented to time and place and person. Relevant Lab findings/reviewed by me:  Lab Results   Component Value Date    WBC 6.30 08/06/2021    HGB 14.0 08/06/2021    HCT 42.0 08/06/2021    MCV 95.0 (H) 08/06/2021     (L) 08/06/2021     Lab Results   Component Value Date    NEUTROABS 4.37 08/06/2021       Relevant Imaging studies/reviewed by me:  CT CHEST W CONTRAST    Result Date: 7/23/2021  1. Stable appearance of the chest from previous study dated 4/7/2021. No radiographic evidence of metastatic disease to the thorax. . Signed by Dr Alexandra Roberson Additional Contrast? Oral    Result Date: 7/23/2021  1. Stable CT of the abdomen and pelvis with evidence of previous right nephrectomy and no findings of intra-abdominal or pelvic metastatic disease. Mild circumferential thickening of the bladder wall likely due to underdistention artifact. 2. Diffuse coronary artery calcified plaque. 3. Moderate volume of calcified plaque within the abdominal aorta and its branches as before. Normal aortic caliber. 4. Normal appendix. 5. Small stable subcentimeter lesion in the liver dome favored to represent a small hemangioma. Signed by Dr Andres Sinha. [facility]      ASSESSMENT:    Orders Placed This Encounter   Procedures    CT ABDOMEN PELVIS W IV CONTRAST Additional Contrast? Oral     Standing Status:   Future     Standing Expiration Date:   2/6/2023     Scheduling Instructions:      sched end of Nov     Order Specific Question:   Additional Contrast?     Answer:   Oral     Order Specific Question:   Reason for exam:     Answer:   4 mo f/u    CT CHEST W CONTRAST     Standing Status:   Future     Standing Expiration Date:   2/6/2023     Scheduling Instructions:      sched end Nov     Order Specific Question:   Reason for exam:     Answer:   4 mo f/u      Gayle Bourne was seen today for follow-up.     Diagnoses and all orders for this visit:    Urothelial cancer ([facility] [address]  -     CT ABDOMEN PELVIS W IV CONTRAST Additional Contrast? Oral; Future  -     CT CHEST W CONTRAST; Future    Malignant neoplasm of right renal pelvis (HCC)  -     CT ABDOMEN PELVIS W IV CONTRAST Additional Contrast? Oral; Future  -     CT CHEST W CONTRAST; Future    Drug-induced blistering disease    Care plan discussed with patient    Drug-induced skin rash    Coordination of complex care    Adverse effect of antineoplastic and immunosuppressive drugs, subsequent encounter    Stage 3a chronic kidney disease ([facility] [address]      #Urothelial carcinoma upper urinary tract, PDL-1 100%. Target lesion: retroperitoneal LN-now measuring 7 mm  No candidate for further RT. Patient was receiving Keytruda but developed skin rash. Development of blistering disorder likely related to [facility]. Continue to hold Keytruda secondary to blistering skin rash. CT C/A/P-No evidence of recurrent disease. Clinical/laboratory assessment of toxicity         At risk thyroid disorderTSH was suppressed 0.6 1/22/2021. Patient currently taking 125mcg levothyroxine. Will recheck TSH l level every 6 weeks. TSH 0.58    CKD stage IIIA-Rickey also has chronic kidney disease stage III. He continues to deny any urinary symptoms to include hematuria. His creatinine is stable at 1.5/GFR 47.     Hypertensionuncontrolled      Hyperglycemiauncontrolled. Glucose 289, on steroid taper. Follow-up with PCP      Bullous Rash- likely related to [place] Resolved and healed. Urgent referral to dermatology as per NCCN guidelines recommendations. Development of blistering disorder likely related to [facility]. Continue to hold Keytruda. Patient's been seen by dermatology.  He has been.      Ty Mccarthy  Plan:  · Will continue to hold Keytruda  · Follow-up with Dr. Che Gonzáles for hypertension/hyperglycemia  · Continue Prednisone   · Continue Triamcinolone cream  · Follow up with Dr Tatiana Ryan/dermatology  · Port flush today and every 8 weeks  · Repeat CT chest/abd/pelvis end of Nov  · RTC MD after scans in Nov.     Follow Up:     Return in about 4 months (around 12/6/2021) for CBC, Appointment with Dr. Ria Severin after CTs. Port flush every 8 weeks  CT c/a/p end Nov      I, Desiree Nunez, am scribing for Naya Muro MD. Electronically signed by Jeff Schuler RN on 8/6/2021 at 8:00 AM CDT. I, Dr Ene Ngo, personally performed the services described in this documentation as scribed by Jeff Schuler RN in my presence and is both accurate and complete. I have seen, examined and reviewed this patient medication list, appropriate labs and imaging studies. I reviewed relevant medical records and others physicians notes. I discussed the plans of care with the patient. I answered all the questions to the patients satisfaction. I have also reviewed the chief complaint (CC) and part of the history (History of Present Illness (HPI), Past Family Social History [facility]), or Review of Systems (ROS) and made changes when appropriated.        (Please note that portions of this note were completed with a voice recognition program. Efforts were made to edit the dictations but occasionally words are mis-transcribed.)

## 2021-08-06 ENCOUNTER — HOSPITAL ENCOUNTER (OUTPATIENT)
Dept: INFUSION THERAPY | Age: 66
Discharge: HOME | End: 2021-08-06
Payer: MEDICARE

## 2021-08-06 ENCOUNTER — OFFICE VISIT (OUTPATIENT)
Dept: HEMATOLOGY | Age: 66
End: 2021-08-06
Payer: MEDICARE

## 2021-08-06 VITALS
HEIGHT: 67 IN | SYSTOLIC BLOOD PRESSURE: 120 MMHG | DIASTOLIC BLOOD PRESSURE: 80 MMHG | HEART RATE: 75 BPM | WEIGHT: 175.5 LBS | BODY MASS INDEX: 27.55 KG/M2 | OXYGEN SATURATION: 96 %

## 2021-08-06 DIAGNOSIS — I87.8 POOR VENOUS ACCESS: ICD-10-CM

## 2021-08-06 DIAGNOSIS — Z71.89 CARE PLAN DISCUSSED WITH PATIENT: ICD-10-CM

## 2021-08-06 DIAGNOSIS — C68.9 UROTHELIAL CANCER (HCC): Primary | ICD-10-CM

## 2021-08-06 DIAGNOSIS — T45.1X5D ADVERSE EFFECT OF ANTINEOPLASTIC AND IMMUNOSUPPRESSIVE DRUGS, SUBSEQUENT ENCOUNTER: ICD-10-CM

## 2021-08-06 DIAGNOSIS — L10.5: ICD-10-CM

## 2021-08-06 DIAGNOSIS — Z71.89 COORDINATION OF COMPLEX CARE: ICD-10-CM

## 2021-08-06 DIAGNOSIS — C65.1 MALIGNANT NEOPLASM OF RIGHT RENAL PELVIS (HCC): ICD-10-CM

## 2021-08-06 DIAGNOSIS — N18.31 STAGE 3A CHRONIC KIDNEY DISEASE (HCC): ICD-10-CM

## 2021-08-06 DIAGNOSIS — L27.0 DRUG-INDUCED SKIN RASH: ICD-10-CM

## 2021-08-06 LAB
BASOPHILS ABSOLUTE: 0.06 K/UL (ref 0.01–0.08)
BASOPHILS RELATIVE PERCENT: 1 % (ref 0.1–1.2)
EOSINOPHILS ABSOLUTE: 0.41 K/UL (ref 0.04–0.54)
EOSINOPHILS RELATIVE PERCENT: 6.5 % (ref 0.7–7)
HCT VFR BLD CALC: 42 % (ref 40.1–51)
HEMOGLOBIN: 14 G/DL (ref 13.7–17.5)
LYMPHOCYTES ABSOLUTE: 0.83 K/UL (ref 1.18–3.74)
LYMPHOCYTES RELATIVE PERCENT: 13.2 % (ref 19.3–53.1)
MCH RBC QN AUTO: 31.7 PG (ref 25.7–32.2)
MCHC RBC AUTO-ENTMCNC: 33.3 G/DL (ref 32.3–36.5)
MCV RBC AUTO: 95 FL (ref 79–92.2)
MONOCYTES ABSOLUTE: 0.63 K/UL (ref 0.24–0.82)
MONOCYTES RELATIVE PERCENT: 10 % (ref 4.7–12.5)
NEUTROPHILS ABSOLUTE: 4.37 K/UL (ref 1.56–6.13)
NEUTROPHILS RELATIVE PERCENT: 69.3 % (ref 34–71.1)
PDW BLD-RTO: 12.8 % (ref 11.6–14.4)
PLATELET # BLD: 121 K/UL (ref 163–337)
PMV BLD AUTO: 10.8 FL (ref 7.4–10.4)
RBC # BLD: 4.42 M/UL (ref 4.63–6.08)
WBC # BLD: 6.3 K/UL (ref 4.23–9.07)

## 2021-08-06 PROCEDURE — G8427 DOCREV CUR MEDS BY ELIG CLIN: HCPCS | Performed by: INTERNAL MEDICINE

## 2021-08-06 PROCEDURE — 99214 OFFICE O/P EST MOD 30 MIN: CPT | Performed by: INTERNAL MEDICINE

## 2021-08-06 PROCEDURE — 3017F COLORECTAL CA SCREEN DOC REV: CPT | Performed by: INTERNAL MEDICINE

## 2021-08-06 PROCEDURE — 99212 OFFICE O/P EST SF 10 MIN: CPT

## 2021-08-06 PROCEDURE — 96523 IRRIG DRUG DELIVERY DEVICE: CPT

## 2021-08-06 PROCEDURE — 4040F PNEUMOC VAC/ADMIN/RCVD: CPT | Performed by: INTERNAL MEDICINE

## 2021-08-06 PROCEDURE — 6360000002 HC RX W HCPCS: Performed by: INTERNAL MEDICINE

## 2021-08-06 PROCEDURE — 85025 COMPLETE CBC W/AUTO DIFF WBC: CPT

## 2021-08-06 PROCEDURE — 2580000003 HC RX 258: Performed by: INTERNAL MEDICINE

## 2021-08-06 PROCEDURE — 4004F PT TOBACCO SCREEN RCVD TLK: CPT | Performed by: INTERNAL MEDICINE

## 2021-08-06 PROCEDURE — G8417 CALC BMI ABV UP PARAM F/U: HCPCS | Performed by: INTERNAL MEDICINE

## 2021-08-06 PROCEDURE — 1123F ACP DISCUSS/DSCN MKR DOCD: CPT | Performed by: INTERNAL MEDICINE

## 2021-08-06 RX ORDER — SODIUM CHLORIDE 9 MG/ML
25 INJECTION, SOLUTION INTRAVENOUS PRN
Status: CANCELLED | OUTPATIENT
Start: 2021-08-06

## 2021-08-06 RX ORDER — HEPARIN SODIUM (PORCINE) LOCK FLUSH IV SOLN 100 UNIT/ML 100 UNIT/ML
500 SOLUTION INTRAVENOUS PRN
Status: DISCONTINUED | OUTPATIENT
Start: 2021-08-06 | End: 2021-08-07 | Stop reason: HOSPADM

## 2021-08-06 RX ORDER — SODIUM CHLORIDE 0.9 % (FLUSH) 0.9 %
5-40 SYRINGE (ML) INJECTION PRN
Status: CANCELLED | OUTPATIENT
Start: 2021-08-06

## 2021-08-06 RX ORDER — HEPARIN SODIUM (PORCINE) LOCK FLUSH IV SOLN 100 UNIT/ML 100 UNIT/ML
500 SOLUTION INTRAVENOUS PRN
Status: CANCELLED | OUTPATIENT
Start: 2021-08-06

## 2021-08-06 RX ORDER — SODIUM CHLORIDE 0.9 % (FLUSH) 0.9 %
5-40 SYRINGE (ML) INJECTION PRN
Status: DISCONTINUED | OUTPATIENT
Start: 2021-08-06 | End: 2021-08-07 | Stop reason: HOSPADM

## 2021-08-06 RX ADMIN — HEPARIN 500 UNITS: 100 SYRINGE at 09:49

## 2021-08-06 RX ADMIN — SODIUM CHLORIDE, PRESERVATIVE FREE 20 ML: 5 INJECTION INTRAVENOUS at 10:32

## 2021-08-06 RX ADMIN — HEPARIN 500 UNITS: 100 SYRINGE at 10:32

## 2021-08-06 RX ADMIN — SODIUM CHLORIDE, PRESERVATIVE FREE 20 ML: 5 INJECTION INTRAVENOUS at 09:49

## 2021-09-29 ENCOUNTER — HOSPITAL ENCOUNTER (OUTPATIENT)
Dept: INFUSION THERAPY | Age: 66
Discharge: HOME | End: 2021-09-29
Payer: MEDICARE

## 2021-09-29 DIAGNOSIS — I87.8 POOR VENOUS ACCESS: Primary | ICD-10-CM

## 2021-09-29 PROCEDURE — 2580000003 HC RX 258: Performed by: INTERNAL MEDICINE

## 2021-09-29 PROCEDURE — 6360000002 HC RX W HCPCS: Performed by: INTERNAL MEDICINE

## 2021-09-29 PROCEDURE — 96523 IRRIG DRUG DELIVERY DEVICE: CPT

## 2021-09-29 RX ORDER — HEPARIN SODIUM (PORCINE) LOCK FLUSH IV SOLN 100 UNIT/ML 100 UNIT/ML
500 SOLUTION INTRAVENOUS PRN
Status: DISCONTINUED | OUTPATIENT
Start: 2021-09-29 | End: 2021-09-30 | Stop reason: HOSPADM

## 2021-09-29 RX ORDER — SODIUM CHLORIDE 0.9 % (FLUSH) 0.9 %
5-40 SYRINGE (ML) INJECTION PRN
Status: CANCELLED | OUTPATIENT
Start: 2021-09-29

## 2021-09-29 RX ORDER — SODIUM CHLORIDE 9 MG/ML
25 INJECTION, SOLUTION INTRAVENOUS PRN
Status: CANCELLED | OUTPATIENT
Start: 2021-09-29

## 2021-09-29 RX ORDER — SODIUM CHLORIDE 0.9 % (FLUSH) 0.9 %
5-40 SYRINGE (ML) INJECTION PRN
Status: DISCONTINUED | OUTPATIENT
Start: 2021-09-29 | End: 2021-09-30 | Stop reason: HOSPADM

## 2021-09-29 RX ORDER — HEPARIN SODIUM (PORCINE) LOCK FLUSH IV SOLN 100 UNIT/ML 100 UNIT/ML
500 SOLUTION INTRAVENOUS PRN
Status: CANCELLED | OUTPATIENT
Start: 2021-09-29

## 2021-09-29 RX ADMIN — SODIUM CHLORIDE, PRESERVATIVE FREE 10 ML: 5 INJECTION INTRAVENOUS at 10:10

## 2021-09-29 RX ADMIN — HEPARIN 500 UNITS: 100 SYRINGE at 10:10

## 2021-11-29 ENCOUNTER — HOSPITAL ENCOUNTER (OUTPATIENT)
Dept: CT IMAGING | Age: 66
Discharge: HOME | End: 2021-11-29
Payer: MEDICARE

## 2021-11-29 DIAGNOSIS — C65.1 MALIGNANT NEOPLASM OF RIGHT RENAL PELVIS (HCC): ICD-10-CM

## 2021-11-29 DIAGNOSIS — C68.9 UROTHELIAL CANCER (HCC): ICD-10-CM

## 2021-11-29 LAB
GFR AFRICAN AMERICAN: 56
GFR NON-AFRICAN AMERICAN: 46
POC CREATININE: 1.5 MG/DL (ref 0.3–1.3)

## 2021-11-29 PROCEDURE — 71260 CT THORAX DX C+: CPT

## 2021-11-29 PROCEDURE — 6360000004 HC RX CONTRAST MEDICATION: Performed by: INTERNAL MEDICINE

## 2021-11-29 PROCEDURE — 82565 ASSAY OF CREATININE: CPT

## 2021-11-29 PROCEDURE — 74177 CT ABD & PELVIS W/CONTRAST: CPT

## 2021-11-29 RX ADMIN — IOPAMIDOL 75 ML: 755 INJECTION, SOLUTION INTRAVENOUS at 08:38

## 2021-12-06 NOTE — PROGRESS NOTES
MEDICAL ONCOLOGY PROGRESS NOTE      Joanna Ramirez   1955 12/8/2021     Chief Complaint   Patient presents with    Follow-up     Urothelial cancer ([facility] [address]       INTERVAL HISTORY/HISTORY OF PRESENT ILLNESS:  Diagnosis   High-grade urothelial carcinoma of the left kidney stage IV, 2010. Recurrence July 2013   Second recurrence October 2015    Treatment summary  1/5/2010-right radical nephrectomy   Relapse February 2013-retroperitoneal soft tissue mass. Gemcitabine/cisplatin completed July 2013 with good partial response, followed by RT 5040 cGy retroperitoneal residual disease, completed October 2013. October 2015- chest wall recurrence, treated with RT   May 2020-carboplatin/Gemzar x4 cycles and Keytruda followed by [facility] maintenance. 9/18/20-03/05/21 Maintenance Keytruda  5/7/21- [facility] on hold due to blister rash secondary to [facility]    Interval history:  The patient is a very pleasant 74years old male who has a history of urothelial carcinoma of the right renal pelvis diagnosed in 2010. The patient was diagnosed with a blistering rash secondary to [facility]. [facility] has been on hold since March 2021. He was seen by dermatology. He has been receiving prednisone. The rash got significantly better but upon discontinuation of prednisone he had a recrudescence of the blistering rash. He is going to be seen by Dr. Malick Black next week. Otherwise, he has been doing well. He denies any abdominal symptoms. He has gained about 10 pounds with the steroids. He also had CT chest abdomen pelvis here to discuss results. Cancer history-High-grade Urothelial carcinoma left renal pelvis  Mr Robert Gallegos was seen in initial oncology consultation on 2/2/2017 as a referral by Dr. Rudolph Kingston office to establish continuity of care of his history of urothelial carcinoma.   7/1/[id number] right renal cyst measuring 2.9 x 3.4 x 2.8 cm.   12/4/[id number] biopsy of a right kidney mass was consistent with poorly differentiated adenocarcinoma. 2/24/2010-the patient was evaluated by Dr. Crow Santos for a right renal tumor. 1/5/2010- He underwent a right radical nephrectomy with the findings of a poorly differentiated adenocarcinoma involving the mid pole of the right kidney measures 6.5 cm in greatest dimension. The tumor extended beyond the renal capsule into the perinephric tissues, but did not extend beyond Gerota's fascia. Margins of resection were negative. Perineural invasion present. No lymph nodes were found in the specimen. No renal vein invasion. Right ureter negative for malignancy. Right adrenal gland negative for malignancy. No lymph nodes identified. Final pathologic staging [id number] stage III. IHC staining performed [facility] showed malignant cells to express PAX-8, p 504s and focal , with negative CK 7 and p63. The case was reviewed by Dr. Kobe Maier [facility], where GATA3 was performed and reported as positive in a significant number of cells. Thus, in his consultation report, Dr Arron Cowden favored urothelial carcinoma over collecting duct carcinoma. 2/24/2010- he was seen by Dr. Henry Garcia and offer a consultation at [facility] for clinical trial, but declined. He was placed on surveillance follow-up with surveillance imaging. He had several follow-up CT scans performed in April 2010, October 2010, January 2011, July 2011, January 2012, August 2012 that were all unremarkable for disease recurrence. ------------------------------ Relapse February 2013--------------------------  2/7/2013-CT scan of the abdomen pelvis revealed retroperitoneal soft tissue density behind the inferior vena cava (2.8 x 2.7 cm) increased in size from prior CT scan on 7/27/2012 02/19/2013- PET/CT scan showed a mass extending along the pericaval lymph node chain from T12-L1 level with SUV 4. Extensive pericaval adenopathy (SUV 5.4).    3/27/2013-he was seen in consultation at [facility]. The retroperitoneal mass could not be resected. He was recommended chemotherapy. He completed palliative chemotherapy with cisplatin/gemcitabine completed on 7/26/2013.   8/7/2013-interval improvement consistent with a positive response to chemotherapy. There was a decrease in size and number of the lymph nodes as well as decrease SUV. 10/30/2013-he completed a course of RT to the retroperitoneal area receiving a total dose of 5040 cGy to the periaortic aortic lymph nodes. 11/18/2013-a CT scan of the abdomen pelvis showed slight diminishment in size in the in nodularity within the retrocaval region. 11/20/2015- He was again seen at [facility] and again resection was not recommended. 10/19/2015-a CT scan of the chest/abdomen pelvis showed new area of soft tissue abnormality in the posterior right chest wall. It measured 2.3 cm and was just medial to the right 11th rib. Another 2.7 cm density anterior to the right 12th rib suggest metastatic disease. 11/24/2015- the patient had a biopsy-proven recurrence on the right chest wall compatible with metastatic carcinoma with glandular differentiation, high-grade. Tissue was sent to [facility] and consistent with metastatic poorly differentiated renal cell carcinoma. Treated with radiation therapy only   12/16/2015- abnormal metabolic activity noted between right 11th rib as described on CT scan for October. Consistent metastatic disease.  -------------- Clinical/radiological remission April 2016 --------------  4/7/2016-patient had CT scans of the chest/abdomen/pelvis which did not show any evidence of new disease compatible with complete clinical remission.    6/8/2016- he was last seen by Dr. Herson Boyer on this date who planned for surveillance scans in October 2016.   10/07/2016- CT scan of the chest/abdomen and pelvis did not show any evidence of metastatic disease, compatible with ongoing complete clinical remission   2/2/2017- PET/CT scan requested and Insurance denied. 3/16/2017- PET/CT scan requested but declined again. 4/18/2017 CT scan of the abdomen and pelvis-showed a stable 1.6 cm retroperitoneal adenopathy. No new evidence of metastatic disease within the abdomen pelvis. 11/29/2017-CT chest/abdomen/pelvis showed no evidence of metastatic disease, compatible with ongoing complete clinical remission. 11/26/2018-CT chest, abdomen, pelvis unremarkable for recurrent disease. 3/30/2020-Ct Chest with contrast A stable CT scan of the chest. No evidence of a neoplastic/metastatic disease. Severe atheromatous changes of coronary arteries similar to the previous study. No evidence of mediastinal or intrathoracic adenopathy. 3/30/2020-CT abdomen pelvis with contrast showed 21 x 17 mm aortocaval lymph node adjacent to the right nephrectomy bed. 4/3/2020- Pet scan showed metastatic lymphadenopathy in the right paraspinal level at T12 and in the aortocaval region. Maximum SUV is in the aortocaval lymph node and measures 14.2. 2. Indeterminate precarinal lymph node demonstrating a maximum SUV of 4.2. This can be followed with subsequent exams. 4/24/2020- EGD[facility] at [facility] and FNA biopsy consistent with recurrent metastatic urothelial carcinoma. IHC positive for PAX-8, PAULY-3 and AE1/AE3. Insufficient cellularity on cellblock for ancillary molecular studies. 4/29/2020-recommended palliative/definitive RT and immunotherapy with pembrolizumab.  5/15/2020-unfortunately not a surgical candidate and not a candidate for palliative or definitive RT. Started on immunotherapy with Joe Moors only. 7/29/2020- Ct Abdomen Pelvis W Contrast Interval decrease in size of an aortocaval lymph node near the right nephrectomy bed. Stable hypodense lesion in the right hepatic lobe. Atherosclerotic disease. Fecal stasis. 12/2/20 Ct Chest W Contrast No acute cardiopulmonary process.   No evidence of metastatic disease. Vascular calcifications present aortic arch and coronary arteries. 12/2/20 Ct Abdomen Pelvis W Iv Contrast  No evidence of disease progression. Stable subcentimeter right liver lobe lesion. Stable 7 mm aortocaval lymph node. Right nephrectomy. Borderline prostate size. 4/7/21 Ct Chest W Contrast No evidence of metastatic disease in the chest region. Atheromatous disease of the thoracic aorta and coronary arteries. Heart size upper limits of normal.   4/7/21 Ct Abdomen Pelvis W Iv Contrast  No interval changes. Postsurgical changes related to prior right nephrectomy without new soft tissue density within the surgical bed to suggest locally recurrent disease. Similar hypodensity near the hepatic dome and aortocaval nodule/lymph node. There is no acute osseous pathology. No suspicious lytic or blastic lesion identified. Prior right hip arthroplasty, without hardware loosening or failure identified visualized aspect. Similar cranial screw of the arthroplasty extending beyond the cortex in the gluteal soft tissues. 4/16/2021-I reviewed C his T chest abdomen pelvis. No evidence of metastatic disease. 7/23/21 CT CHEST W CONTRAST  Stable appearance of the chest from previous study dated 4/7/2021. No radiographic evidence of metastatic disease to the thorax. 7/23/21 CT ABDOMEN PELVIS W IV CONTRAST  Stable CT of the abdomen and pelvis with evidence of previous right nephrectomy and no findings of intra-abdominal or pelvic metastatic disease. Mild circumferential thickening of the bladder wall likely due to underdistention artifact. Diffuse coronary artery calcified plaque. Moderate volume of calcified plaque within the abdominal aorta and its branches as before. Normal aortic caliber. Normal appendix. Small stable subcentimeter lesion in the liver dome favored to represent a small hemangioma.    8/6/2021-essentially, no evidence of metastatic disease  11/29/2021 CT Chest w/Contrast Stable appearance of the chest . No radiographic evidence of metastatic disease to the thorax. 11/29/2021 CT Abd/Pelvis w/ IV Contrast(Oral) Stable CT abdomen,pelvis, without evidence of recurring disease. 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)     Metastatic disease (HCC)     Retinopathy     Urothelial cancer ([facility] [address] 4/29/2013          PAST SURGICAL HISTORY:  Past Surgical History:   Procedure Laterality Date    COLONOSCOPY  2010    Dr Cheryl Chacko  12/04/2009    LEG SURGERY      PARTIAL NEPHRECTOMY Right 01/05/2012    VASCULAR SURGERY  04- SJS    us guided cannulation of right internal jugular vein, right internal jugular vein single lumen port placement [employer]    WRIST SURGERY Right         SOCIAL HISTORY:  Social History     Socioeconomic History    Marital status:      Spouse name: Not on file    Number of children: Not on file    Years of education: Not on file    Highest education level: Not on file   Occupational History    Not on file   Tobacco Use    Smoking status: Former Smoker     Types: Cigars    Smokeless tobacco: Current User   Substance and Sexual Activity    Alcohol use: No    Drug use: No    Sexual activity: Not on file   Other Topics Concern    Not on file   Social History Narrative    Not on file     Social Determinants of Health     Financial Resource Strain:     Difficulty of Paying Living Expenses: Not on file   Food Insecurity:     Worried About Running Out of Food in the Last Year: Not on file    Lul[place] of Food in the Last Year: Not on file   Transportation Needs:     Lack of Transportation (Medical): Not on file    Lack of Transportation (Non-Medical):  Not on file   Physical Activity:     Days of Exercise per Week: Not on file    Minutes of Exercise per Session: Not on file   Stress:     Feeling of Stress : Not on file   Social Connections:     Frequency of Communication with Friends and Family: Not on file    Frequency of Social Gatherings with Friends and Family: Not on file    Attends Orthodoxy Services: Not on file    Active Member of [address] [place] [address] or Organizations: Not on file    Attends Club or Organization Meetings: Not on file    Marital Status: Not on file   Intimate Partner Violence:     Fear of Current or Ex-Partner: Not on file    Emotionally Abused: Not on file    Physically Abused: Not on file    Sexually Abused: Not on file   Housing Stability:     Unable to Pay for Housing in the Last Year: Not on file    Number of [place] in the Last Year: Not on file    Unstable Housing in the Last Year: Not on file       FAMILY HISTORY:  Family History   Problem Relation Age of Onset    Cervical Cancer Mother     Kidney Cancer Father         Current Outpatient Medications   Medication Sig Dispense Refill    hydrOXYzine (ATARAX) 25 MG tablet       ibuprofen (ADVIL;MOTRIN) 200 MG tablet Take 1 tablet by mouth every 6 hours as needed      SURE COMFORT INSULIN SYRINGE 31G X 5/16\" 0.5 ML MISC       mupirocin (BACTROBAN) 2 % ointment       simvastatin (ZOCOR) 20 MG tablet       triamcinolone (KENALOG) 0.1 % cream Apply topically 2 times daily. 80 g 5    canagliflozin (INVOKANA) 100 MG TABS tablet Take 100 mg by mouth every morning (before breakfast)      lidocaine-prilocaine (EMLA) 2.5-2.5 % cream Apply topically as needed for Pain Apply topically as needed. 1 Tube 1    amLODIPine (NORVASC) 5 MG tablet Take 1 tablet by mouth 2 times daily 30 tablet     Multiple Vitamins-Minerals (PRESERVISION AREDS 2) CAPS Take by mouth 2 times daily      insulin glulisine (APIDRA) 100 UNIT/ML injection Inject  into the skin nightly.       insulin detemir (LEVEMIR) 100 UNIT/ML injection Inject  into the skin nightly.  levothyroxine (SYNTHROID) 125 MCG tablet Take 125 mcg by mouth Daily.  predniSONE (DELTASONE) 20 MG tablet Take 3 tabs daily  x2 days then take 2 tabs daily x2 days then 1 tab daily x2 days (Patient not taking: Reported on 8/6/2021) 20 tablet 0    cetirizine (ZYRTEC) 10 MG tablet Take 10 mg by mouth daily. (Patient not taking: Reported on 12/8/2021)       No current facility-administered medications for this visit.      Facility-Administered Medications Ordered in Other Visits   Medication Dose Route Frequency Provider Last Rate Last Admin    0.9 % sodium chloride infusion  25 mL IntraVENous PRN Pranav Quintero MD        heparin flush 100 UNIT/ML injection 500 Units  500 Units IntraCATHeter PRN Pranav Quintero MD            REVIEW OF SYSTEMS:   CONSTITUTIONAL: no fever, no night sweats, fatigue;  HEENT: no blurring of vision, no double vision, no hearing difficulty, no tinnitus, no ulceration, no dysplasia, no epistaxis;   LUNGS: no cough, no hemoptysis, no wheeze,  no shortness of breath;  CARDIOVASCULAR: no palpitation, no chest pain, no shortness of breath;  GI: no abdominal pain, no nausea, no vomiting, no diarrhea, no constipation;  RE: no dysuria, no hematuria, no frequency or urgency, no nephrolithiasis;  MUSCULOSKELETAL: no joint pain, no swelling, no stiffness;  ENDOCRINE: no polyuria, no polydipsia, no cold or heat intolerance;  HEMATOLOGY: no easy bruising or bleeding, no history of clotting disorder;  DERMATOLOGY: skin blistering, no skin rash, no eczema, no pruritus;  PSYCHIATRY: no depression, no anxiety, no panic attacks, no suicidal ideation, no homicidal ideation;  NEUROLOGY: no syncope, no seizures, no numbness or tingling of hands, no numbness or tingling of feet, no paresis;       BP (!) 148/70   Pulse 70   Ht 5' 7\" (1.702 m)   Wt 178 lb 8 oz (81 kg)   SpO2 99%   BMI 27.96 kg/m²    Pain scale:0    PHYSICAL (ROS) and made changes when appropriated.        (Please note that portions of this note were completed with a voice recognition program. Efforts were made to edit the dictations but occasionally words are mis-transcribed.)    Electronically signed by Ale Maxwell MD on 12/8/2021 at 8:53 AM

## 2021-12-08 ENCOUNTER — OFFICE VISIT (OUTPATIENT)
Dept: HEMATOLOGY | Age: 66
End: 2021-12-08
Payer: MEDICARE

## 2021-12-08 ENCOUNTER — HOSPITAL ENCOUNTER (OUTPATIENT)
Dept: INFUSION THERAPY | Age: 66
Discharge: HOME | End: 2021-12-08
Payer: MEDICARE

## 2021-12-08 VITALS
HEIGHT: 67 IN | WEIGHT: 178.5 LBS | OXYGEN SATURATION: 99 % | BODY MASS INDEX: 28.02 KG/M2 | HEART RATE: 70 BPM | SYSTOLIC BLOOD PRESSURE: 148 MMHG | DIASTOLIC BLOOD PRESSURE: 70 MMHG

## 2021-12-08 DIAGNOSIS — C68.9 UROTHELIAL CANCER (HCC): ICD-10-CM

## 2021-12-08 DIAGNOSIS — C64.9 RENAL CELL CARCINOMA, UNSPECIFIED LATERALITY (HCC): ICD-10-CM

## 2021-12-08 DIAGNOSIS — T45.1X5S ANTINEOPLASTIC DRUGS CAUSING ADVERSE EFFECT, SEQUELA: ICD-10-CM

## 2021-12-08 DIAGNOSIS — R53.83 OTHER FATIGUE: ICD-10-CM

## 2021-12-08 DIAGNOSIS — T14.8XXA BLISTERING: ICD-10-CM

## 2021-12-08 DIAGNOSIS — Z71.89 CARE PLAN DISCUSSED WITH PATIENT: Primary | ICD-10-CM

## 2021-12-08 DIAGNOSIS — I87.8 POOR VENOUS ACCESS: Primary | ICD-10-CM

## 2021-12-08 DIAGNOSIS — R79.89 ELEVATED TSH: ICD-10-CM

## 2021-12-08 LAB
ALBUMIN SERPL-MCNC: 4.5 G/DL (ref 3.5–5.2)
ALP BLD-CCNC: 122 U/L (ref 40–130)
ALT SERPL-CCNC: 15 U/L (ref 21–72)
ANION GAP SERPL CALCULATED.3IONS-SCNC: 8 MMOL/L (ref 7–19)
AST SERPL-CCNC: 26 U/L (ref 17–59)
BASOPHILS ABSOLUTE: 0.02 K/UL (ref 0.01–0.08)
BASOPHILS RELATIVE PERCENT: 0.3 % (ref 0.1–1.2)
BILIRUB SERPL-MCNC: 1 MG/DL (ref 0.2–1.3)
BUN BLDV-MCNC: 24 MG/DL (ref 9–20)
CALCIUM SERPL-MCNC: 10.1 MG/DL (ref 8.4–10.2)
CHLORIDE BLD-SCNC: 103 MMOL/L (ref 98–111)
CO2: 26 MMOL/L (ref 22–29)
CREAT SERPL-MCNC: 1.5 MG/DL (ref 0.6–1.2)
EOSINOPHILS ABSOLUTE: 0.74 K/UL (ref 0.04–0.54)
EOSINOPHILS RELATIVE PERCENT: 9.5 % (ref 0.7–7)
GFR NON-AFRICAN AMERICAN: 46
GLOBULIN: 3.2 G/DL
GLUCOSE BLD-MCNC: 102 MG/DL (ref 74–106)
HCT VFR BLD CALC: 46.8 % (ref 40.1–51)
HEMOGLOBIN: 16 G/DL (ref 13.7–17.5)
LYMPHOCYTES ABSOLUTE: 1.14 K/UL (ref 1.18–3.74)
LYMPHOCYTES RELATIVE PERCENT: 14.6 % (ref 19.3–53.1)
MCH RBC QN AUTO: 31.4 PG (ref 25.7–32.2)
MCHC RBC AUTO-ENTMCNC: 34.2 G/DL (ref 32.3–36.5)
MCV RBC AUTO: 91.8 FL (ref 79–92.2)
MONOCYTES ABSOLUTE: 0.76 K/UL (ref 0.24–0.82)
MONOCYTES RELATIVE PERCENT: 9.7 % (ref 4.7–12.5)
NEUTROPHILS ABSOLUTE: 5.14 K/UL (ref 1.56–6.13)
NEUTROPHILS RELATIVE PERCENT: 65.9 % (ref 34–71.1)
PDW BLD-RTO: 12.4 % (ref 11.6–14.4)
PLATELET # BLD: 132 K/UL (ref 163–337)
PMV BLD AUTO: 10.2 FL (ref 7.4–10.4)
POTASSIUM SERPL-SCNC: 4.6 MMOL/L (ref 3.5–5.1)
RBC # BLD: 5.1 M/UL (ref 4.63–6.08)
SODIUM BLD-SCNC: 137 MMOL/L (ref 137–145)
TOTAL PROTEIN: 7.7 G/DL (ref 6.3–8.2)
TSH SERPL DL<=0.05 MIU/L-ACNC: 0.27 UIU/ML (ref 0.47–4.68)
WBC # BLD: 7.8 K/UL (ref 4.23–9.07)

## 2021-12-08 PROCEDURE — 84443 ASSAY THYROID STIM HORMONE: CPT

## 2021-12-08 PROCEDURE — 6360000002 HC RX W HCPCS: Performed by: INTERNAL MEDICINE

## 2021-12-08 PROCEDURE — 96523 IRRIG DRUG DELIVERY DEVICE: CPT

## 2021-12-08 PROCEDURE — 99212 OFFICE O/P EST SF 10 MIN: CPT

## 2021-12-08 PROCEDURE — 4004F PT TOBACCO SCREEN RCVD TLK: CPT | Performed by: INTERNAL MEDICINE

## 2021-12-08 PROCEDURE — 3017F COLORECTAL CA SCREEN DOC REV: CPT | Performed by: INTERNAL MEDICINE

## 2021-12-08 PROCEDURE — 1123F ACP DISCUSS/DSCN MKR DOCD: CPT | Performed by: INTERNAL MEDICINE

## 2021-12-08 PROCEDURE — G8417 CALC BMI ABV UP PARAM F/U: HCPCS | Performed by: INTERNAL MEDICINE

## 2021-12-08 PROCEDURE — 85025 COMPLETE CBC W/AUTO DIFF WBC: CPT

## 2021-12-08 PROCEDURE — G8484 FLU IMMUNIZE NO ADMIN: HCPCS | Performed by: INTERNAL MEDICINE

## 2021-12-08 PROCEDURE — G8428 CUR MEDS NOT DOCUMENT: HCPCS | Performed by: INTERNAL MEDICINE

## 2021-12-08 PROCEDURE — 36415 COLL VENOUS BLD VENIPUNCTURE: CPT | Performed by: INTERNAL MEDICINE

## 2021-12-08 PROCEDURE — 99214 OFFICE O/P EST MOD 30 MIN: CPT | Performed by: INTERNAL MEDICINE

## 2021-12-08 PROCEDURE — 4040F PNEUMOC VAC/ADMIN/RCVD: CPT | Performed by: INTERNAL MEDICINE

## 2021-12-08 PROCEDURE — 80053 COMPREHEN METABOLIC PANEL: CPT

## 2021-12-08 PROCEDURE — 2580000003 HC RX 258: Performed by: INTERNAL MEDICINE

## 2021-12-08 RX ORDER — IBUPROFEN 200 MG
1 TABLET ORAL EVERY 6 HOURS PRN
COMMUNITY

## 2021-12-08 RX ORDER — SODIUM CHLORIDE 9 MG/ML
25 INJECTION, SOLUTION INTRAVENOUS PRN
Status: DISCONTINUED | OUTPATIENT
Start: 2021-12-08 | End: 2021-12-09 | Stop reason: HOSPADM

## 2021-12-08 RX ORDER — SIMVASTATIN 20 MG
TABLET ORAL
COMMUNITY
Start: 2021-10-11

## 2021-12-08 RX ORDER — HEPARIN SODIUM (PORCINE) LOCK FLUSH IV SOLN 100 UNIT/ML 100 UNIT/ML
500 SOLUTION INTRAVENOUS PRN
Status: CANCELLED | OUTPATIENT
Start: 2021-12-08

## 2021-12-08 RX ORDER — SODIUM CHLORIDE 0.9 % (FLUSH) 0.9 %
5-40 SYRINGE (ML) INJECTION PRN
Status: CANCELLED | OUTPATIENT
Start: 2021-12-08

## 2021-12-08 RX ORDER — HEPARIN SODIUM (PORCINE) LOCK FLUSH IV SOLN 100 UNIT/ML 100 UNIT/ML
500 SOLUTION INTRAVENOUS PRN
Status: DISCONTINUED | OUTPATIENT
Start: 2021-12-08 | End: 2021-12-09 | Stop reason: HOSPADM

## 2021-12-08 RX ORDER — SODIUM CHLORIDE 0.9 % (FLUSH) 0.9 %
5-40 SYRINGE (ML) INJECTION PRN
Status: DISCONTINUED | OUTPATIENT
Start: 2021-12-08 | End: 2021-12-09 | Stop reason: HOSPADM

## 2021-12-08 RX ORDER — SYRING-NEEDL,DISP,INSUL,0.3 ML 31 GX5/16"
SYRINGE, EMPTY DISPOSABLE MISCELLANEOUS
COMMUNITY
Start: 2021-11-08

## 2021-12-08 RX ORDER — SODIUM CHLORIDE 9 MG/ML
25 INJECTION, SOLUTION INTRAVENOUS PRN
Status: CANCELLED | OUTPATIENT
Start: 2021-12-08

## 2021-12-08 RX ORDER — HYDROXYZINE HYDROCHLORIDE 25 MG/1
TABLET, FILM COATED ORAL
COMMUNITY
Start: 2021-11-22 | End: 2022-04-12

## 2021-12-08 RX ADMIN — HEPARIN 500 UNITS: 100 SYRINGE at 09:07

## 2021-12-08 RX ADMIN — SODIUM CHLORIDE, PRESERVATIVE FREE 10 ML: 5 INJECTION INTRAVENOUS at 09:07

## 2022-02-02 ENCOUNTER — HOSPITAL ENCOUNTER (OUTPATIENT)
Dept: INFUSION THERAPY | Age: 67
Discharge: HOME | End: 2022-02-02
Payer: MEDICARE

## 2022-02-02 DIAGNOSIS — I87.8 POOR VENOUS ACCESS: Primary | ICD-10-CM

## 2022-02-02 PROCEDURE — 2580000003 HC RX 258: Performed by: INTERNAL MEDICINE

## 2022-02-02 PROCEDURE — 6360000002 HC RX W HCPCS: Performed by: INTERNAL MEDICINE

## 2022-02-02 PROCEDURE — 96523 IRRIG DRUG DELIVERY DEVICE: CPT

## 2022-02-02 RX ORDER — HEPARIN SODIUM (PORCINE) LOCK FLUSH IV SOLN 100 UNIT/ML 100 UNIT/ML
500 SOLUTION INTRAVENOUS PRN
Status: CANCELLED | OUTPATIENT
Start: 2022-02-02

## 2022-02-02 RX ORDER — HEPARIN SODIUM (PORCINE) LOCK FLUSH IV SOLN 100 UNIT/ML 100 UNIT/ML
500 SOLUTION INTRAVENOUS PRN
Status: DISCONTINUED | OUTPATIENT
Start: 2022-02-02 | End: 2022-02-03 | Stop reason: HOSPADM

## 2022-02-02 RX ORDER — SODIUM CHLORIDE 0.9 % (FLUSH) 0.9 %
5-40 SYRINGE (ML) INJECTION PRN
Status: DISCONTINUED | OUTPATIENT
Start: 2022-02-02 | End: 2022-02-03 | Stop reason: HOSPADM

## 2022-02-02 RX ORDER — SODIUM CHLORIDE 0.9 % (FLUSH) 0.9 %
5-40 SYRINGE (ML) INJECTION PRN
Status: CANCELLED | OUTPATIENT
Start: 2022-02-02

## 2022-02-02 RX ORDER — SODIUM CHLORIDE 9 MG/ML
25 INJECTION, SOLUTION INTRAVENOUS PRN
Status: CANCELLED | OUTPATIENT
Start: 2022-02-02

## 2022-02-02 RX ADMIN — HEPARIN 500 UNITS: 100 SYRINGE at 08:38

## 2022-02-02 RX ADMIN — SODIUM CHLORIDE, PRESERVATIVE FREE 10 ML: 5 INJECTION INTRAVENOUS at 08:39

## 2022-04-01 NOTE — PROGRESS NOTES
MEDICAL ONCOLOGY PROGRESS NOTE      Beena Su   1955 4/6/2022     Chief Complaint   Patient presents with    Follow-up     Urothelial cancer ([address]       INTERVAL HISTORY/HISTORY OF PRESENT ILLNESS:  Diagnosis   High-grade urothelial carcinoma of the left kidney stage IV, 2010. Recurrence July 2013   Second recurrence October 2015    Treatment summary  1/5/2010-right radical nephrectomy   Relapse February 2013-retroperitoneal soft tissue mass. Gemcitabine/cisplatin completed July 2013 with good partial response, followed by RT 5040 cGy retroperitoneal residual disease, completed October 2013. October 2015- chest wall recurrence, treated with RT   May 2020-carboplatin/Gemzar x4 cycles and Keytruda followed by [address] maintenance. 9/18/20-03/05/21 Maintenance Keytruda  5/7/21- [address] on hold due to blister rash secondary to [address]    Interval history:  The patient is a very pleasant 74years old male who has a history of urothelial carcinoma of the right renal pelvis diagnosed in 2010. The patient was diagnosed with a blistering rash secondary to [address]. [address] has been on hold since March 2021. He was seen by dermatology. He rash got significantly better but upon discontinuation of prednisone he had a recrudescence of the blistering rash. His last rash was in January 2022. He is going to be seen by Dr. Merl Buerger next week. Otherwise, he has been doing well. He denies any abdominal symptoms. He has gained about 4 pounds since last visit. He had CT scans performed. He is here to discuss results and further treatment recommendations. He has been doing well otherwise. Cancer history-High-grade Urothelial carcinoma left renal pelvis  Mr Fanny Barbosa was seen in initial oncology consultation on 2/2/2017 as a referral by Dr. Almendarez Jose office to establish continuity of care of his history of urothelial carcinoma. 7/1/[id number] right renal cyst measuring 2.9 x 3.4 x 2.8 cm. 12/4/[id number]-rovtbv biopsy of a right kidney mass was consistent with poorly differentiated adenocarcinoma. 2/24/2010-the patient was evaluated by Dr. Donell Trent for a right renal tumor. 1/5/2010- He underwent a right radical nephrectomy with the findings of a poorly differentiated adenocarcinoma involving the mid pole of the right kidney measures 6.5 cm in greatest dimension. The tumor extended beyond the renal capsule into the perinephric tissues, but did not extend beyond Gerota's fascia. Margins of resection were negative. Perineural invasion present. No lymph nodes were found in the specimen. No renal vein invasion. Right ureter negative for malignancy. Right adrenal gland negative for malignancy. No lymph nodes identified. Final pathologic staging [id number] stage III. IHC staining performed at [facility] and [facility] showed malignant cells to express PAX-8, p 504s and focal , with negative CK 7 and p63. The case was reviewed by Dr. Mcdaniels King [facility], where GATA3 was performed and reported as positive in a significant number of cells. Thus, in his consultation report, Dr Eric Stokes favored urothelial carcinoma over collecting duct carcinoma. 2/24/2010- he was seen by Dr. Abbe Kraus and offer a consultation at [facility] for clinical trial, but declined. He was placed on surveillance follow-up with surveillance imaging. He had several follow-up CT scans performed in April 2010, October 2010, January 2011, July 2011, January 2012, August 2012 that were all unremarkable for disease recurrence.   ------------------------------ Relapse February 2013--------------------------  2/7/2013-CT scan of the abdomen pelvis revealed retroperitoneal soft tissue density behind the inferior vena cava (2.8 x 2.7 cm) increased in size from prior CT scan on 7/27/2012 02/19/2013- PET/CT scan showed a mass extending along the pericaval lymph node chain from T12-L1 level with SUV 4. Extensive pericaval adenopathy (SUV 5.4). 3/27/2013-he was seen in consultation at [facility]. The retroperitoneal mass could not be resected. He was recommended chemotherapy. He completed palliative chemotherapy with cisplatin/gemcitabine completed on 7/26/2013.   8/7/2013-interval improvement consistent with a positive response to chemotherapy. There was a decrease in size and number of the lymph nodes as well as decrease SUV. 10/30/2013-he completed a course of RT to the retroperitoneal area receiving a total dose of 5040 cGy to the periaortic aortic lymph nodes. 11/18/2013-a CT scan of the abdomen pelvis showed slight diminishment in size in the in nodularity within the retrocaval region. 11/20/2015- He was again seen at [facility] and again resection was not recommended. 10/19/2015-a CT scan of the chest/abdomen pelvis showed new area of soft tissue abnormality in the posterior right chest wall. It measured 2.3 cm and was just medial to the right 11th rib. Another 2.7 cm density anterior to the right 12th rib suggest metastatic disease. 11/24/2015- the patient had a biopsy-proven recurrence on the right chest wall compatible with metastatic carcinoma with glandular differentiation, high-grade. Tissue was sent to [facility] and consistent with metastatic poorly differentiated renal cell carcinoma. Treated with radiation therapy only   12/16/2015- abnormal metabolic activity noted between right 11th rib as described on CT scan for October. Consistent metastatic disease.  -------------- Clinical/radiological remission April 2016 --------------  4/7/2016-patient had CT scans of the chest/abdomen/pelvis which did not show any evidence of new disease compatible with complete clinical remission.    6/8/2016- he was last seen by Dr. Ekta Floyd on this date who planned for surveillance scans in October 2016.   10/07/2016- CT scan of the chest/abdomen and pelvis did not show any evidence of metastatic disease, compatible with ongoing complete clinical remission   2/2/2017- PET/CT scan requested and Insurance denied. 3/16/2017- PET/CT scan requested but declined again. 4/18/2017 CT scan of the abdomen and pelvis-showed a stable 1.6 cm retroperitoneal adenopathy. No new evidence of metastatic disease within the abdomen pelvis. 11/29/2017-CT chest/abdomen/pelvis showed no evidence of metastatic disease, compatible with ongoing complete clinical remission. 11/26/2018-CT chest, abdomen, pelvis unremarkable for recurrent disease. 3/30/2020-Ct Chest with contrast A stable CT scan of the chest. No evidence of a neoplastic/metastatic disease. Severe atheromatous changes of coronary arteries similar to the previous study. No evidence of mediastinal or intrathoracic adenopathy. 3/30/2020-CT abdomen pelvis with contrast showed 21 x 17 mm aortocaval lymph node adjacent to the right nephrectomy bed. 4/3/2020- Pet scan showed metastatic lymphadenopathy in the right paraspinal level at T12 and in the aortocaval region. Maximum SUV is in the aortocaval lymph node and measures 14.2. 2. Indeterminate precarinal lymph node demonstrating a maximum SUV of 4.2. This can be followed with subsequent exams. 4/24/2020- EGD/EUS at [address] [place] [address] and FNA biopsy consistent with recurrent metastatic urothelial carcinoma. IHC positive for PAX-8, PAULY-3 and AE1/AE3. Insufficient cellularity on cellblock for ancillary molecular studies. 4/29/2020-recommended palliative/definitive RT and immunotherapy with pembrolizumab.  5/15/2020-unfortunately not a surgical candidate and not a candidate for palliative or definitive RT. Started on immunotherapy with [place] only. 7/29/2020- Ct Abdomen Pelvis W Contrast Interval decrease in size of an aortocaval lymph node near the right nephrectomy bed. Stable hypodense lesion in the right hepatic lobe. Atherosclerotic disease. Fecal stasis.    12/2/20 Ct Chest W Contrast No acute cardiopulmonary process. No evidence of metastatic disease. Vascular calcifications present aortic arch and coronary arteries. 12/2/20 Ct Abdomen Pelvis W Iv Contrast  No evidence of disease progression. Stable subcentimeter right liver lobe lesion. Stable 7 mm aortocaval lymph node. Right nephrectomy. Borderline prostate size. 4/7/21 Ct Chest W Contrast No evidence of metastatic disease in the chest region. Atheromatous disease of the thoracic aorta and coronary arteries. Heart size upper limits of normal.   4/7/21 Ct Abdomen Pelvis W Iv Contrast  No interval changes. Postsurgical changes related to prior right nephrectomy without new soft tissue density within the surgical bed to suggest locally recurrent disease. Similar hypodensity near the hepatic dome and aortocaval nodule/lymph node. There is no acute osseous pathology. No suspicious lytic or blastic lesion identified. Prior right hip arthroplasty, without hardware loosening or failure identified visualized aspect. Similar cranial screw of the arthroplasty extending beyond the cortex in the gluteal soft tissues. 4/16/2021-I reviewed C his T chest abdomen pelvis. No evidence of metastatic disease. 7/23/21 CT CHEST W CONTRAST  Stable appearance of the chest from previous study dated 4/7/2021. No radiographic evidence of metastatic disease to the thorax. 7/23/21 CT ABDOMEN PELVIS W IV CONTRAST  Stable CT of the abdomen and pelvis with evidence of previous right nephrectomy and no findings of intra-abdominal or pelvic metastatic disease. Mild circumferential thickening of the bladder wall likely due to underdistention artifact. Diffuse coronary artery calcified plaque. Moderate volume of calcified plaque within the abdominal aorta and its branches as before. Normal aortic caliber. Normal appendix. Small stable subcentimeter lesion in the liver dome favored to represent a small hemangioma.    8/6/2021-essentially, no evidence of metastatic disease  11/29/2021 CT Chest w/Contrast Stable appearance of the chest . No radiographic evidence of metastatic disease to the thorax. 11/29/2021 CT Abd/Pelvis w/ IV Contrast(Oral) Stable CT abdomen,pelvis, without evidence of recurring disease. 4/4/2022 CT Abd/Pelvis W IV Contrast (Oral) No evidence or recurrent or metastatic disease in the abdomen or pelvis.   4/4/2022 CT Chest W Contrast No evidence of metastatic disease in the chest.    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)     Metastatic disease (HCC)     Retinopathy     Urothelial cancer ([address] 4/29/2013          PAST SURGICAL HISTORY:  Past Surgical History:   Procedure Laterality Date    COLONOSCOPY  2010    Dr Reyna Jose  12/04/2009    LEG SURGERY      PARTIAL NEPHRECTOMY Right 01/05/2012    VASCULAR SURGERY  04- SJS    us guided cannulation of right internal jugular vein, right internal jugular vein single lumen port placement [employer]    WRIST SURGERY Right         SOCIAL HISTORY:  Social History     Socioeconomic History    Marital status:      Spouse name: Not on file    Number of children: Not on file    Years of education: Not on file    Highest education level: Not on file   Occupational History    Not on file   Tobacco Use    Smoking status: Former Smoker     Types: Cigars    Smokeless tobacco: Current User   Substance and Sexual Activity    Alcohol use: No    Drug use: No    Sexual activity: Not on file   Other Topics Concern    Not on file   Social History Narrative    Not on file     Social Determinants of Health     Financial Resource Strain:     Difficulty of Paying Living Expenses: Not on file Food Insecurity:     Worried About Running Out of Food in the Last Year: Not on file    [place] of Food in the Last Year: Not on file   Transportation Needs:     Lack of Transportation (Medical): Not on file    Lack of Transportation (Non-Medical): Not on file   Physical Activity:     Days of Exercise per Week: Not on file    Minutes of Exercise per Session: Not on file   Stress:     Feeling of Stress : Not on file   Social Connections:     Frequency of Communication with Friends and Family: Not on file    Frequency of Social Gatherings with Friends and Family: Not on file    Attends Yarsani Services: Not on file    Active Member of [address] [place] [address] [employer] or Organizations: Not on file    Attends Club or Organization Meetings: Not on file    Marital Status: Not on file   Intimate Partner Violence:     Fear of Current or Ex-Partner: Not on file    Emotionally Abused: Not on file    Physically Abused: Not on file    Sexually Abused: Not on file   Housing Stability:     Unable to Pay for Housing in the Last Year: Not on file    Number of [place] in the Last Year: Not on file    Unstable Housing in the Last Year: Not on file       FAMILY HISTORY:  Family History   Problem Relation Age of Onset    Cervical Cancer Mother     Kidney Cancer Father         Current Outpatient Medications   Medication Sig Dispense Refill    hydrOXYzine (ATARAX) 25 MG tablet       ibuprofen (ADVIL;MOTRIN) 200 MG tablet Take 1 tablet by mouth every 6 hours as needed      SURE COMFORT INSULIN SYRINGE 31G X 5/16\" 0.5 ML MISC       mupirocin (BACTROBAN) 2 % ointment       simvastatin (ZOCOR) 20 MG tablet       triamcinolone (KENALOG) 0.1 % cream Apply topically 2 times daily. 80 g 5    canagliflozin (INVOKANA) 100 MG TABS tablet Take 100 mg by mouth every morning (before breakfast)      lidocaine-prilocaine (EMLA) 2.5-2.5 % cream Apply topically as needed for Pain Apply topically as needed.  1 Tube 1    amLODIPine (NORVASC) 5 MG tablet Take 1 tablet by mouth 2 times daily 30 tablet     Multiple Vitamins-Minerals (PRESERVISION AREDS 2) CAPS Take by mouth 2 times daily      insulin glulisine (APIDRA) 100 UNIT/ML injection Inject  into the skin nightly.  insulin detemir (LEVEMIR) 100 UNIT/ML injection Inject into the skin 2 times daily       levothyroxine (SYNTHROID) 125 MCG tablet Take 125 mcg by mouth Daily.  predniSONE (DELTASONE) 20 MG tablet Take 3 tabs daily  x2 days then take 2 tabs daily x2 days then 1 tab daily x2 days (Patient not taking: Reported on 8/6/2021) 20 tablet 0    cetirizine (ZYRTEC) 10 MG tablet Take 10 mg by mouth daily. (Patient not taking: Reported on 12/8/2021)       No current facility-administered medications for this visit. Facility-Administered Medications Ordered in Other Visits   Medication Dose Route Frequency Provider Last Rate Last Admin    sodium chloride flush 0.9 % injection 5-40 mL  5-40 mL IntraVENous PRN Komal Gleason MD        heparin flush 100 UNIT/ML injection 500 Units  500 Units IntraCATHeter PRN Komal Gleason MD            REVIEW OF SYSTEMS:   CONSTITUTIONAL: no fever, no night sweats, no fatigue, weight gain;   HEENT: no blurring of vision, no double vision, no hearing difficulty, no tinnitus, no ulceration, no dysplasia, no epistaxis;   LUNGS: no cough, no hemoptysis, no wheeze,  no shortness of breath;  CARDIOVASCULAR: no palpitation, no chest pain, no shortness of breath;  GI: no abdominal pain, no nausea, no vomiting, no diarrhea, no constipation;  RE: no dysuria, no hematuria, no frequency or urgency, no nephrolithiasis;  MUSCULOSKELETAL: no joint pain, no swelling, no stiffness;  ENDOCRINE: no polyuria, no polydipsia, no cold or heat intolerance;  HEMATOLOGY: no easy bruising or bleeding, no history of clotting disorder;  DERMATOLOGY: improving skin rash, no eczema, no pruritus;  PSYCHIATRY: no depression, no anxiety, no panic attacks, no suicidal [facility])    Care plan discussed with patient      #Urothelial carcinoma upper urinary tract, PDL-1 100%. Target lesion: retroperitoneal LN-now measuring 7 mm  No candidate for further RT. Patient was receiving Keytruda but developed skin rash. Development of blistering disorder likely related to . Continue to hold Keytruda secondary to blistering skin rash. 4/4/2022-CT C/A/P-No evidence of recurrent disease. Continue clinical surveillance/radiological surveillance    Treatment related toxicity-skin blistering rash. He noticed another rash in his right upper back. The rash has resolved spontaneously. He is followed by dermatology. Clinical/laboratory assessment of toxicity         At risk thyroid disorder-TSH was suppressed 0.6 1/22/2021. Patient currently taking 125mcg levothyroxine. TSH 0.269. CKD stage IIIA-Rickey also has chronic kidney disease stage III. He continues to deny any urinary symptoms to include hematuria. His creatinine is stable at 1.5/GFR 47.     Hypertension-uncontrolled   /76   Pulse 70   Ht 5' 7\" (1.702 m)   Wt 182 lb 14.4 oz (83 kg)   SpO2 99%   BMI 28.65 kg/m²      Hyperglycemia-controlled. Follow-up with PCP      Bullous Rash- likely related to [place] Resolved and healed. Urgent referral to dermatology as per NCCN guidelines recommendations. Development of blistering disorder likely related to . Continue to hold Keytruda. Patient's been seen by dermatology. He has been.        [place] [place]  Plan:  · Will continue to hold Keytruda  · Follow-up with Dr. Roderick Rea for hypertension/hyperglycemia  · Continue Prednisone   · Continue lidocaine-prilocaine cream-refill sent  · Continue Triamcinolone cream  · Follow up with Dr Hilary Ryan/dermatology  · Port flush today and every 8 weeks  · Repeat CT chest/abd/pelvis in 4 months  · RTC with MD in 4 months after scans    Follow Up:     Return for CBC, Appointment with Dr. Angel Monteiro.    Please sched CT C/A/P in 4 months   Port Flush every 8 weeks      ICleve am pre charting as Medical Assistant for Laura Del Cid MD. Electronically signed by Cleve Mcqueen MA on 4/6/2022 at 8:30 AM CDT. Neli Palomino am scribing as Medical Assistant for Laura Del Cid MD. Electronically signed by Cleve Mcqueen MA on 4/6/2022 at 8:30 AM CDT. I, Dr Melvin Brewster, personally performed the services described in this documentation as scribed by Cleve Mcqueen MA in my presence and is both accurate and complete. I have seen, examined and reviewed this patient medication list, appropriate labs and imaging studies. I reviewed relevant medical records and others physicians notes. I discussed the plans of care with the patient. I answered all the questions to the patients satisfaction. I have also reviewed the chief complaint (CC) and part of the history (History of Present Illness (HPI), Past Family Social History [facility]), or Review of Systems (ROS) and made changes when appropriated. (Please note that portions of this note were completed with a voice recognition program. Efforts were made to edit the dictations but occasionally words are mis-transcribed. )Electronically signed by Laura Del Cid MD on 4/6/2022 at 8:39 AM

## 2022-04-04 ENCOUNTER — HOSPITAL ENCOUNTER (OUTPATIENT)
Dept: CT IMAGING | Age: 67
Discharge: HOME | End: 2022-04-04
Payer: MEDICARE

## 2022-04-04 DIAGNOSIS — C64.9 RENAL CELL CARCINOMA, UNSPECIFIED LATERALITY (HCC): ICD-10-CM

## 2022-04-04 DIAGNOSIS — C68.9 UROTHELIAL CANCER (HCC): ICD-10-CM

## 2022-04-04 DIAGNOSIS — C68.9 UROTHELIAL CANCER (HCC): Primary | ICD-10-CM

## 2022-04-04 LAB
GFR AFRICAN AMERICAN: >60
GFR NON-AFRICAN AMERICAN: 50
POC CREATININE: 1.4 MG/DL (ref 0.3–1.3)

## 2022-04-04 PROCEDURE — 6360000004 HC RX CONTRAST MEDICATION: Performed by: INTERNAL MEDICINE

## 2022-04-04 PROCEDURE — 82565 ASSAY OF CREATININE: CPT

## 2022-04-04 PROCEDURE — 71260 CT THORAX DX C+: CPT

## 2022-04-04 PROCEDURE — 74177 CT ABD & PELVIS W/CONTRAST: CPT

## 2022-04-04 RX ADMIN — IOPAMIDOL 50 ML: 755 INJECTION, SOLUTION INTRAVENOUS at 09:07

## 2022-04-06 ENCOUNTER — HOSPITAL ENCOUNTER (OUTPATIENT)
Dept: INFUSION THERAPY | Age: 67
Discharge: HOME | End: 2022-04-06
Payer: MEDICARE

## 2022-04-06 ENCOUNTER — OFFICE VISIT (OUTPATIENT)
Dept: HEMATOLOGY | Age: 67
End: 2022-04-06
Payer: MEDICARE

## 2022-04-06 VITALS
HEART RATE: 70 BPM | HEIGHT: 67 IN | DIASTOLIC BLOOD PRESSURE: 76 MMHG | BODY MASS INDEX: 28.71 KG/M2 | OXYGEN SATURATION: 99 % | SYSTOLIC BLOOD PRESSURE: 136 MMHG | WEIGHT: 182.9 LBS

## 2022-04-06 DIAGNOSIS — C68.9 UROTHELIAL CANCER (HCC): Primary | ICD-10-CM

## 2022-04-06 DIAGNOSIS — Z71.89 CARE PLAN DISCUSSED WITH PATIENT: ICD-10-CM

## 2022-04-06 DIAGNOSIS — I87.8 POOR VENOUS ACCESS: Primary | ICD-10-CM

## 2022-04-06 DIAGNOSIS — C65.1 MALIGNANT NEOPLASM OF RIGHT RENAL PELVIS (HCC): ICD-10-CM

## 2022-04-06 DIAGNOSIS — Z45.2 ENCOUNTER FOR ADJUSTMENT AND MANAGEMENT OF VASCULAR ACCESS DEVICE: ICD-10-CM

## 2022-04-06 DIAGNOSIS — C68.9 UROTHELIAL CANCER (HCC): ICD-10-CM

## 2022-04-06 DIAGNOSIS — I87.8 POOR VENOUS ACCESS: ICD-10-CM

## 2022-04-06 LAB
HCT VFR BLD CALC: 42.8 % (ref 40.1–51)
HEMOGLOBIN: 14.2 G/DL (ref 13.7–17.5)
MCH RBC QN AUTO: 29.6 PG (ref 25.7–32.2)
MCHC RBC AUTO-ENTMCNC: 33.2 G/DL (ref 32.3–36.5)
MCV RBC AUTO: 89.2 FL (ref 79–92.2)
PDW BLD-RTO: 12.6 % (ref 11.6–14.4)
PLATELET # BLD: 125 K/UL (ref 163–337)
PMV BLD AUTO: 10.3 FL (ref 7.4–10.4)
RBC # BLD: 4.8 M/UL (ref 4.63–6.08)
WBC # BLD: 6 K/UL (ref 4.23–9.07)

## 2022-04-06 PROCEDURE — 6360000002 HC RX W HCPCS: Performed by: INTERNAL MEDICINE

## 2022-04-06 PROCEDURE — 2580000003 HC RX 258: Performed by: INTERNAL MEDICINE

## 2022-04-06 PROCEDURE — 1123F ACP DISCUSS/DSCN MKR DOCD: CPT | Performed by: INTERNAL MEDICINE

## 2022-04-06 PROCEDURE — 3017F COLORECTAL CA SCREEN DOC REV: CPT | Performed by: INTERNAL MEDICINE

## 2022-04-06 PROCEDURE — 96523 IRRIG DRUG DELIVERY DEVICE: CPT

## 2022-04-06 PROCEDURE — 99213 OFFICE O/P EST LOW 20 MIN: CPT | Performed by: INTERNAL MEDICINE

## 2022-04-06 PROCEDURE — 4040F PNEUMOC VAC/ADMIN/RCVD: CPT | Performed by: INTERNAL MEDICINE

## 2022-04-06 PROCEDURE — G8417 CALC BMI ABV UP PARAM F/U: HCPCS | Performed by: INTERNAL MEDICINE

## 2022-04-06 PROCEDURE — 99213 OFFICE O/P EST LOW 20 MIN: CPT

## 2022-04-06 PROCEDURE — 85027 COMPLETE CBC AUTOMATED: CPT

## 2022-04-06 PROCEDURE — G8428 CUR MEDS NOT DOCUMENT: HCPCS | Performed by: INTERNAL MEDICINE

## 2022-04-06 PROCEDURE — 4004F PT TOBACCO SCREEN RCVD TLK: CPT | Performed by: INTERNAL MEDICINE

## 2022-04-06 PROCEDURE — 36415 COLL VENOUS BLD VENIPUNCTURE: CPT

## 2022-04-06 RX ORDER — SODIUM CHLORIDE 0.9 % (FLUSH) 0.9 %
5-40 SYRINGE (ML) INJECTION PRN
Status: DISCONTINUED | OUTPATIENT
Start: 2022-04-06 | End: 2022-04-07 | Stop reason: HOSPADM

## 2022-04-06 RX ORDER — HEPARIN SODIUM (PORCINE) LOCK FLUSH IV SOLN 100 UNIT/ML 100 UNIT/ML
500 SOLUTION INTRAVENOUS PRN
Status: DISCONTINUED | OUTPATIENT
Start: 2022-04-06 | End: 2022-04-07 | Stop reason: HOSPADM

## 2022-04-06 RX ORDER — SODIUM CHLORIDE 0.9 % (FLUSH) 0.9 %
5-40 SYRINGE (ML) INJECTION PRN
Status: CANCELLED | OUTPATIENT
Start: 2022-04-06

## 2022-04-06 RX ORDER — HEPARIN SODIUM (PORCINE) LOCK FLUSH IV SOLN 100 UNIT/ML 100 UNIT/ML
500 SOLUTION INTRAVENOUS PRN
Status: CANCELLED | OUTPATIENT
Start: 2022-04-06

## 2022-04-06 RX ORDER — SODIUM CHLORIDE 9 MG/ML
25 INJECTION, SOLUTION INTRAVENOUS PRN
Status: CANCELLED | OUTPATIENT
Start: 2022-04-06

## 2022-04-06 RX ORDER — LIDOCAINE AND PRILOCAINE 25; 25 MG/G; MG/G
CREAM TOPICAL PRN
Qty: 30 G | Refills: 0 | Status: SHIPPED | OUTPATIENT
Start: 2022-04-06

## 2022-04-06 RX ORDER — LIDOCAINE AND PRILOCAINE 25; 25 MG/G; MG/G
CREAM TOPICAL PRN
Qty: 30 G | Refills: 0 | Status: SHIPPED | OUTPATIENT
Start: 2022-04-06 | End: 2022-04-06 | Stop reason: CLARIF

## 2022-04-06 RX ADMIN — SODIUM CHLORIDE, PRESERVATIVE FREE 20 ML: 5 INJECTION INTRAVENOUS at 08:45

## 2022-04-06 RX ADMIN — HEPARIN 500 UNITS: 100 SYRINGE at 14:45

## 2022-04-12 RX ORDER — HYDROXYZINE HYDROCHLORIDE 25 MG/1
TABLET, FILM COATED ORAL
Qty: 60 TABLET | Refills: 3 | Status: SHIPPED | OUTPATIENT
Start: 2022-04-12

## 2022-06-01 ENCOUNTER — HOSPITAL ENCOUNTER (OUTPATIENT)
Dept: INFUSION THERAPY | Age: 67
Discharge: HOME | End: 2022-06-01
Payer: MEDICARE

## 2022-06-01 DIAGNOSIS — I87.8 POOR VENOUS ACCESS: Primary | ICD-10-CM

## 2022-06-01 PROCEDURE — 96523 IRRIG DRUG DELIVERY DEVICE: CPT

## 2022-06-01 PROCEDURE — 2580000003 HC RX 258: Performed by: INTERNAL MEDICINE

## 2022-06-01 PROCEDURE — 6360000002 HC RX W HCPCS: Performed by: INTERNAL MEDICINE

## 2022-06-01 RX ORDER — SODIUM CHLORIDE 0.9 % (FLUSH) 0.9 %
5-40 SYRINGE (ML) INJECTION PRN
Status: DISCONTINUED | OUTPATIENT
Start: 2022-06-01 | End: 2022-06-02 | Stop reason: HOSPADM

## 2022-06-01 RX ORDER — HEPARIN SODIUM (PORCINE) LOCK FLUSH IV SOLN 100 UNIT/ML 100 UNIT/ML
500 SOLUTION INTRAVENOUS PRN
Status: DISCONTINUED | OUTPATIENT
Start: 2022-06-01 | End: 2022-06-02 | Stop reason: HOSPADM

## 2022-06-01 RX ADMIN — SODIUM CHLORIDE, PRESERVATIVE FREE 10 ML: 5 INJECTION INTRAVENOUS at 09:00

## 2022-06-01 RX ADMIN — HEPARIN 500 UNITS: 100 SYRINGE at 09:00

## 2022-08-08 ENCOUNTER — HOSPITAL ENCOUNTER (OUTPATIENT)
Dept: CT IMAGING | Age: 67
Discharge: HOME | End: 2022-08-08
Payer: MEDICARE

## 2022-08-08 DIAGNOSIS — C68.9 UROTHELIAL CANCER (HCC): ICD-10-CM

## 2022-08-08 PROCEDURE — 71260 CT THORAX DX C+: CPT

## 2022-08-08 PROCEDURE — 6360000004 HC RX CONTRAST MEDICATION: Performed by: INTERNAL MEDICINE

## 2022-08-08 PROCEDURE — 74177 CT ABD & PELVIS W/CONTRAST: CPT | Performed by: RADIOLOGY

## 2022-08-08 PROCEDURE — 71260 CT THORAX DX C+: CPT | Performed by: RADIOLOGY

## 2022-08-08 PROCEDURE — 74177 CT ABD & PELVIS W/CONTRAST: CPT

## 2022-08-08 RX ADMIN — IOPAMIDOL 60 ML: 755 INJECTION, SOLUTION INTRAVENOUS at 09:53

## 2022-08-09 NOTE — PROGRESS NOTES
history of urothelial carcinoma. 7/1/20090375-[id number] right renal cyst measuring 2.9 x 3.4 x 2.8 cm.   12/4/[id number]-LX-ryympe biopsy of a right kidney mass was consistent with poorly differentiated adenocarcinoma. 2/24/2010-the patient was evaluated by Dr. Priscila Cooper for a right renal tumor. 1/5/2010- He underwent a right radical nephrectomy with the findings of a poorly differentiated adenocarcinoma involving the mid pole of the right kidney measures 6.5 cm in greatest dimension. The tumor extended beyond the renal capsule into the perinephric tissues, but did not extend beyond Gerota's fascia. Margins of resection were negative. Perineural invasion present. No lymph nodes were found in the specimen. No renal vein invasion. Right ureter negative for malignancy. Right adrenal gland negative for malignancy. No lymph nodes identified. Final pathologic staging [id number] stage III. IHC staining performed at [facility] and [facility] showed malignant cells to express PAX-8, p 504s and focal , with negative CK 7 and p63. The case was reviewed by Dr. Stacey Hernandez [facility], where GATA3 was performed and reported as positive in a significant number of cells. Thus, in his consultation report, Dr Evita Gamez favored urothelial carcinoma over collecting duct carcinoma. 2/24/2010- he was seen by Dr. Terrence Harrison and offer a consultation at [facility] for clinical trial, but declined. He was placed on surveillance follow-up with surveillance imaging. He had several follow-up CT scans performed in April 2010, October 2010, January 2011, July 2011, January 2012, August 2012 that were all unremarkable for disease recurrence.   ------------------------------ Relapse February 2013--------------------------  2/7/2013-CT scan of the abdomen pelvis revealed retroperitoneal soft tissue density behind the inferior vena cava (2.8 x 2.7 cm) increased in size from prior CT scan on 7/27/2012 02/19/2013- PET/CT scan showed a mass extending along the pericaval lymph node chain from T12-L1 level with SUV 4. Extensive pericaval adenopathy (SUV 5.4). 3/27/2013-he was seen in consultation at [facility]. The retroperitoneal mass could not be resected. He was recommended chemotherapy. He completed palliative chemotherapy with cisplatin/gemcitabine completed on 7/26/2013.   8/7/2013-interval improvement consistent with a positive response to chemotherapy. There was a decrease in size and number of the lymph nodes as well as decrease SUV. 10/30/2013-he completed a course of RT to the retroperitoneal area receiving a total dose of 5040 cGy to the periaortic aortic lymph nodes. 11/18/2013-a CT scan of the abdomen pelvis showed slight diminishment in size in the in nodularity within the retrocaval region. 11/20/2015- He was again seen at [facility] and again resection was not recommended. 10/19/2015-a CT scan of the chest/abdomen pelvis showed new area of soft tissue abnormality in the posterior right chest wall. It measured 2.3 cm and was just medial to the right 11th rib. Another 2.7 cm density anterior to the right 12th rib suggest metastatic disease. 11/24/2015- the patient had a biopsy-proven recurrence on the right chest wall compatible with metastatic carcinoma with glandular differentiation, high-grade. Tissue was sent to [facility] and consistent with metastatic poorly differentiated renal cell carcinoma. Treated with radiation therapy only   12/16/2015- abnormal metabolic activity noted between right 11th rib as described on CT scan for October. Consistent metastatic disease.  -------------- Clinical/radiological remission April 2016 --------------  4/7/2016-patient had CT scans of the chest/abdomen/pelvis which did not show any evidence of new disease compatible with complete clinical remission.    6/8/2016- he was last seen by Dr. Yovany Simon on this date who planned for surveillance scans in October 2016.   10/07/2016- CT scan of the chest/abdomen and pelvis did not show any evidence of metastatic disease, compatible with ongoing complete clinical remission   2/2/2017- PET/CT scan requested and Insurance denied. 3/16/2017- PET/CT scan requested but declined again. 4/18/2017 CT scan of the abdomen and pelvis-showed a stable 1.6 cm retroperitoneal adenopathy. No new evidence of metastatic disease within the abdomen pelvis. 11/29/2017-CT chest/abdomen/pelvis showed no evidence of metastatic disease, compatible with ongoing complete clinical remission. 11/26/2018-CT chest, abdomen, pelvis unremarkable for recurrent disease. 3/30/2020-Ct Chest with contrast A stable CT scan of the chest. No evidence of a neoplastic/metastatic disease. Severe atheromatous changes of coronary arteries similar to the previous study. No evidence of mediastinal or intrathoracic adenopathy. 3/30/2020-CT abdomen pelvis with contrast showed 21 x 17 mm aortocaval lymph node adjacent to the right nephrectomy bed. 4/3/2020- Pet scan showed metastatic lymphadenopathy in the right paraspinal level at T12 and in the aortocaval region. Maximum SUV is in the aortocaval lymph node and measures 14.2. 2. Indeterminate precarinal lymph node demonstrating a maximum SUV of 4.2. This can be followed with subsequent exams. 4/24/2020- EGD/EUS at [facility] and FNA biopsy consistent with recurrent metastatic urothelial carcinoma. IHC positive for PAX-8, PAULY-3 and AE1/AE3. Insufficient cellularity on cellblock for ancillary molecular studies. 4/29/2020-recommended palliative/definitive RT and immunotherapy with pembrolizumab.  5/15/2020-unfortunately not a surgical candidate and not a candidate for palliative or definitive RT. Started on immunotherapy with [place] only. 7/29/2020- Ct Abdomen Pelvis W Contrast Interval decrease in size of an aortocaval lymph node near the right nephrectomy bed. Stable hypodense lesion in the right hepatic lobe. Atherosclerotic disease. Fecal stasis. 12/2/20 Ct Chest W Contrast No acute cardiopulmonary process. No evidence of metastatic disease. Vascular calcifications present aortic arch and coronary arteries. 12/2/20 Ct Abdomen Pelvis W Iv Contrast  No evidence of disease progression. Stable subcentimeter right liver lobe lesion. Stable 7 mm aortocaval lymph node. Right nephrectomy. Borderline prostate size. 4/7/21 Ct Chest W Contrast No evidence of metastatic disease in the chest region. Atheromatous disease of the thoracic aorta and coronary arteries. Heart size upper limits of normal.   4/7/21 Ct Abdomen Pelvis W Iv Contrast  No interval changes. Postsurgical changes related to prior right nephrectomy without new soft tissue density within the surgical bed to suggest locally recurrent disease. Similar hypodensity near the hepatic dome and aortocaval nodule/lymph node. There is no acute osseous pathology. No suspicious lytic or blastic lesion identified. Prior right hip arthroplasty, without hardware loosening or failure identified visualized aspect. Similar cranial screw of the arthroplasty extending beyond the cortex in the gluteal soft tissues. 4/16/2021-I reviewed C his T chest abdomen pelvis. No evidence of metastatic disease. 7/23/21 CT CHEST W CONTRAST  Stable appearance of the chest from previous study dated 4/7/2021. No radiographic evidence of metastatic disease to the thorax. 7/23/21 CT ABDOMEN PELVIS W IV CONTRAST  Stable CT of the abdomen and pelvis with evidence of previous right nephrectomy and no findings of intra-abdominal or pelvic metastatic disease. Mild circumferential thickening of the bladder wall likely due to underdistention artifact. Diffuse coronary artery calcified plaque. Moderate volume of calcified plaque within the abdominal aorta and its branches as before. Normal aortic caliber. Normal appendix.  Small stable SJS    us guided cannulation of right internal jugular vein, right internal jugular vein single lumen port placement Bard Powerport    WRIST SURGERY Right         SOCIAL HISTORY:  Social History     Socioeconomic History    Marital status:    Tobacco Use    Smoking status: Former     Types: Cigars    Smokeless tobacco: Current   Substance and Sexual Activity    Alcohol use: No    Drug use: No       FAMILY HISTORY:  Family History   Problem Relation Age of Onset    Cervical Cancer Mother     Kidney Cancer Father         Current Outpatient Medications   Medication Sig Dispense Refill    hydrOXYzine (ATARAX) 25 MG tablet TAKE ONE (1) TABLET BY MOUTH EVERY 6 HOURS AS NEEDED FOR ITCHING 60 tablet 3    lidocaine-prilocaine (EMLA) 2.5-2.5 % cream Apply topically as needed for Pain Apply topically as needed. 30 g 0    ibuprofen (ADVIL;MOTRIN) 200 MG tablet Take 1 tablet by mouth every 6 hours as needed      SURE COMFORT INSULIN SYRINGE 31G X 5/16\" 0.5 ML MISC       mupirocin (BACTROBAN) 2 % ointment       simvastatin (ZOCOR) 20 MG tablet       predniSONE (DELTASONE) 20 MG tablet Take 3 tabs daily  x2 days then take 2 tabs daily x2 days then 1 tab daily x2 days (Patient not taking: Reported on 8/6/2021) 20 tablet 0    triamcinolone (KENALOG) 0.1 % cream Apply topically 2 times daily. 80 g 5    canagliflozin (INVOKANA) 100 MG TABS tablet Take 100 mg by mouth every morning (before breakfast)      amLODIPine (NORVASC) 5 MG tablet Take 1 tablet by mouth 2 times daily 30 tablet     Multiple Vitamins-Minerals (PRESERVISION AREDS 2) CAPS Take by mouth 2 times daily      insulin glulisine (APIDRA) 100 UNIT/ML injection Inject  into the skin nightly. insulin detemir (LEVEMIR) 100 UNIT/ML injection Inject into the skin 2 times daily       levothyroxine (SYNTHROID) 125 MCG tablet Take 125 mcg by mouth Daily. cetirizine (ZYRTEC) 10 MG tablet Take 10 mg by mouth daily.  (Patient not taking: Reported on 12/8/2021)       No current facility-administered medications for this visit. Facility-Administered Medications Ordered in Other Visits   Medication Dose Route Frequency Provider Last Rate Last Admin    sodium chloride flush 0.9 % injection 5-40 mL  5-40 mL IntraVENous PRN Sushil Walls MD        heparin flush 100 UNIT/ML injection 500 Units  500 Units IntraCATHeter PRN Sushil Walls MD            REVIEW OF SYSTEMS:   CONSTITUTIONAL: no fever, no night sweats, no fatigue;  HEENT: no blurring of vision, no double vision, no hearing difficulty, no tinnitus, no ulceration, no dysplasia, no epistaxis;   LUNGS: no cough, no hemoptysis, no wheeze,  no shortness of breath;  CARDIOVASCULAR: no palpitation, no chest pain, no shortness of breath;  GI: no abdominal pain, no nausea, no vomiting, no diarrhea, no constipation;  RE: no dysuria, no hematuria, no frequency or urgency, no nephrolithiasis;  MUSCULOSKELETAL: no joint pain, no swelling, no stiffness;  ENDOCRINE: no polyuria, no polydipsia, no cold or heat intolerance;  HEMATOLOGY: no easy bruising or bleeding, no history of clotting disorder;  DERMATOLOGY: occasional skin rash, no eczema, no pruritus;  PSYCHIATRY: no depression, no anxiety, no panic attacks, no suicidal ideation, no homicidal ideation;  NEUROLOGY: no syncope, no seizures, no numbness or tingling of hands, no numbness or tingling of feet, no paresis;      /74 (Site: Left Upper Arm, Position: Sitting)   Pulse 64   Ht 5' 7\" (1.702 m)   Wt 193 lb 3.2 oz (87.6 kg)   SpO2 97%   BMI 30.26 kg/m²      PHYSICAL EXAM:  CONSTITUTIONAL: Alert, appropriate, no acute distress,   EYES: Non icteric, EOM intact, pupils equal round and reactive to light and accommodation. ENT: Oral mucus membranes moist, no oral pharyngeal lesions. External inspection of ears and nose are normal.   NECK: Supple, no masses.  No palpable thyroid mass    CHEST/LUNGS: CTA bilaterally, normal respiratory effort   CARDIOVASCULAR: RRR, no murmurs. No lower extremity edema   ABDOMEN: soft non-tender, active bowel sounds, no hepatosplenomegaly. No palpable masses. EXTREMITIES: warm, Full ROM of all fours extremities. No focal weakness. SKIN: warm, dry with no rashes or lesions  LYMPH: No cervical, clavicular, axillary, or inguinal lymphadenopathy  NEUROLOGIC: follows commands, non focal.   PSYCH: mood and affect appropriate. Alert and oriented to time and place and person. Relevant Lab findings/reviewed by me:  Lab Results   Component Value Date    WBC 5.94 08/10/2022    HGB 13.6 (L) 08/10/2022    HCT 40.7 08/10/2022    MCV 89.8 08/10/2022    PLT 89 (L) 08/10/2022     Lab Results   Component Value Date    NEUTROABS 3.86 08/10/2022       Relevant Imaging studies/reviewed by me:  8/8/2022 CT Chest W Contrast No significant acute abnormality appreciated, with findings, as above. (Compared to previous CT dated 4th April 2022 , no evidence of interval metastatic disease to the chest.).    8/8/2022 CT Abd/Pelvis W IV Contrast (Oral) Post-nephrectomy status on the right side with no recurrent/residual mass lesion at the operative bed. No interval change. No evidence of metastatic disease within the abdomen or pelvis. Small hypodensity segment VII of liver, unchanged as compared to prior, too small to characterize. Lumbar spondylosis and aortic atherosclerosis. Retained colonic fecal matter, correlate for constipation. Total hip joint replacement the right side.       ASSESSMENT:    Orders Placed This Encounter   Procedures    CT ABDOMEN PELVIS W IV CONTRAST Additional Contrast? Oral     Standing Status:   Future     Standing Expiration Date:   8/10/2023     Scheduling Instructions:      Sched 4 months     Order Specific Question:   Additional Contrast?     Answer:   Oral     Order Specific Question:   STAT Creatinine as needed:     Answer:   No     Order Specific Question:   Reason for exam:     Answer:   COMPARE TO PREVIOUS SCANS for surveillance insulin pump. Bullous Rash- likely related to [place] Resolved and healed. Urgent referral to dermatology as per NCCN guidelines recommendations. 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 Continue to hold Keytruda. Patient's been seen by dermatology. Plan:  RTC with MD 4 months after scans  Will continue to hold Keytruda  CBC today and 2 months  Repeat CT scans in 4 months, Dec 2022  Follow-up with Dr. Faith Ponce for hypertension/hyperglycemia  Continue Prednisone   Continue lidocaine-prilocaine cream  Continue Triamcinolone cream  Follow up with Dr Maria Fernanda Ryan/dermatology  Port flush today and every 8 weeks      Follow Up:     Return in about 4 months (around 12/10/2022) for CBC, Appointment with Dr. Erick Guadarrama & port flush after CT scans. Ct c/a/p 4 months  Port flush every 2 months  CBC in 2 months      IDahlia am pre charting  as Medical Assistant for Samir Leyva MD. Electronically signed by Dahlia Richter MA on 8/10/2022 at 11:09 AM CDT. Cyn Jordan am scribing for Samir Leyva MD. Electronically signed by Irlanda Gonzales RN on 8/10/2022 at 9:40 AM CDT. I, Dr Susan Zavala, personally performed the services described in this documentation as scribed by Irlanda Gonzales, GLORIA in my presence and is both accurate and complete. I have seen, examined and reviewed this patient medication list, appropriate labs and imaging studies. I reviewed relevant medical records and others physicians notes. I discussed the plans of care with the patient. I answered all the questions to the patients satisfaction. I have also reviewed the chief complaint (CC) and part of the history (History of Present Illness (HPI), Past Family Social History [facility]), or Review of Systems (ROS) and made changes when appropriated.        (Please note that portions of this note were completed with a voice recognition program. Efforts were made to edit the dictations but occasionally words are mis-transcribed.)  Electronically signed by Ellen Kong MD on 8/10/2022 at 9:47 AM

## 2022-08-10 ENCOUNTER — OFFICE VISIT (OUTPATIENT)
Dept: HEMATOLOGY | Age: 67
End: 2022-08-10
Payer: MEDICARE

## 2022-08-10 ENCOUNTER — HOSPITAL ENCOUNTER (OUTPATIENT)
Dept: INFUSION THERAPY | Age: 67
Discharge: HOME | End: 2022-08-10
Payer: MEDICARE

## 2022-08-10 VITALS
SYSTOLIC BLOOD PRESSURE: 132 MMHG | DIASTOLIC BLOOD PRESSURE: 74 MMHG | HEART RATE: 64 BPM | OXYGEN SATURATION: 97 % | HEIGHT: 67 IN | BODY MASS INDEX: 30.32 KG/M2 | WEIGHT: 193.2 LBS

## 2022-08-10 DIAGNOSIS — D69.6 THROMBOCYTOPENIA (HCC): ICD-10-CM

## 2022-08-10 DIAGNOSIS — C65.1 MALIGNANT NEOPLASM OF RIGHT RENAL PELVIS (HCC): ICD-10-CM

## 2022-08-10 DIAGNOSIS — I87.8 POOR VENOUS ACCESS: ICD-10-CM

## 2022-08-10 DIAGNOSIS — C68.9 UROTHELIAL CANCER (HCC): Primary | ICD-10-CM

## 2022-08-10 DIAGNOSIS — E03.2 DRUG-INDUCED HYPOTHYROIDISM: ICD-10-CM

## 2022-08-10 LAB
BASOPHILS ABSOLUTE: 0.08 K/UL (ref 0.01–0.08)
BASOPHILS ABSOLUTE: 0.1 K/UL (ref 0–0.2)
BASOPHILS RELATIVE PERCENT: 1 % (ref 0–1)
BASOPHILS RELATIVE PERCENT: 1.3 % (ref 0.1–1.2)
EOSINOPHILS ABSOLUTE: 0.3 K/UL (ref 0–0.6)
EOSINOPHILS ABSOLUTE: 0.35 K/UL (ref 0.04–0.54)
EOSINOPHILS RELATIVE PERCENT: 5.6 % (ref 0–5)
EOSINOPHILS RELATIVE PERCENT: 5.9 % (ref 0.7–7)
HCT VFR BLD CALC: 40.2 % (ref 42–52)
HCT VFR BLD CALC: 40.7 % (ref 40.1–51)
HEMOGLOBIN: 13.6 G/DL (ref 13.7–17.5)
HEMOGLOBIN: 13.8 G/DL (ref 14–18)
IMMATURE GRANULOCYTES #: 0 K/UL
LYMPHOCYTES ABSOLUTE: 0.98 K/UL (ref 1.18–3.74)
LYMPHOCYTES ABSOLUTE: 1 K/UL (ref 1.1–4.5)
LYMPHOCYTES RELATIVE PERCENT: 16.5 % (ref 19.3–53.1)
LYMPHOCYTES RELATIVE PERCENT: 17.5 % (ref 20–40)
MCH RBC QN AUTO: 30 PG (ref 25.7–32.2)
MCH RBC QN AUTO: 30.5 PG (ref 27–31)
MCHC RBC AUTO-ENTMCNC: 33.4 G/DL (ref 32.3–36.5)
MCHC RBC AUTO-ENTMCNC: 34.3 G/DL (ref 33–37)
MCV RBC AUTO: 88.7 FL (ref 80–94)
MCV RBC AUTO: 89.8 FL (ref 79–92.2)
MONOCYTES ABSOLUTE: 0.66 K/UL (ref 0.24–0.82)
MONOCYTES ABSOLUTE: 0.7 K/UL (ref 0–0.9)
MONOCYTES RELATIVE PERCENT: 11.1 % (ref 4.7–12.5)
MONOCYTES RELATIVE PERCENT: 11.2 % (ref 0–10)
NEUTROPHILS ABSOLUTE: 3.8 K/UL (ref 1.5–7.5)
NEUTROPHILS ABSOLUTE: 3.86 K/UL (ref 1.56–6.13)
NEUTROPHILS RELATIVE PERCENT: 64.5 % (ref 50–65)
NEUTROPHILS RELATIVE PERCENT: 65 % (ref 34–71.1)
PDW BLD-RTO: 12.5 % (ref 11.5–14.5)
PDW BLD-RTO: 12.5 % (ref 11.6–14.4)
PLATELET # BLD: 148 K/UL (ref 130–400)
PLATELET # BLD: 89 K/UL (ref 163–337)
PMV BLD AUTO: 10.7 FL (ref 7.4–10.4)
PMV BLD AUTO: 11.1 FL (ref 9.4–12.4)
RBC # BLD: 4.53 M/UL (ref 4.63–6.08)
RBC # BLD: 4.53 M/UL (ref 4.7–6.1)
WBC # BLD: 5.9 K/UL (ref 4.8–10.8)
WBC # BLD: 5.94 K/UL (ref 4.23–9.07)

## 2022-08-10 PROCEDURE — 85025 COMPLETE CBC W/AUTO DIFF WBC: CPT

## 2022-08-10 PROCEDURE — 4004F PT TOBACCO SCREEN RCVD TLK: CPT | Performed by: INTERNAL MEDICINE

## 2022-08-10 PROCEDURE — 99212 OFFICE O/P EST SF 10 MIN: CPT

## 2022-08-10 PROCEDURE — 99213 OFFICE O/P EST LOW 20 MIN: CPT | Performed by: INTERNAL MEDICINE

## 2022-08-10 PROCEDURE — 96523 IRRIG DRUG DELIVERY DEVICE: CPT

## 2022-08-10 PROCEDURE — 1123F ACP DISCUSS/DSCN MKR DOCD: CPT | Performed by: INTERNAL MEDICINE

## 2022-08-10 PROCEDURE — G8417 CALC BMI ABV UP PARAM F/U: HCPCS | Performed by: INTERNAL MEDICINE

## 2022-08-10 PROCEDURE — 3017F COLORECTAL CA SCREEN DOC REV: CPT | Performed by: INTERNAL MEDICINE

## 2022-08-10 PROCEDURE — 6360000002 HC RX W HCPCS: Performed by: INTERNAL MEDICINE

## 2022-08-10 PROCEDURE — 2580000003 HC RX 258: Performed by: INTERNAL MEDICINE

## 2022-08-10 PROCEDURE — G8427 DOCREV CUR MEDS BY ELIG CLIN: HCPCS | Performed by: INTERNAL MEDICINE

## 2022-08-10 RX ORDER — HEPARIN SODIUM (PORCINE) LOCK FLUSH IV SOLN 100 UNIT/ML 100 UNIT/ML
500 SOLUTION INTRAVENOUS PRN
Status: CANCELLED | OUTPATIENT
Start: 2022-08-10

## 2022-08-10 RX ORDER — SODIUM CHLORIDE 9 MG/ML
25 INJECTION, SOLUTION INTRAVENOUS PRN
OUTPATIENT
Start: 2022-08-10

## 2022-08-10 RX ORDER — HEPARIN SODIUM (PORCINE) LOCK FLUSH IV SOLN 100 UNIT/ML 100 UNIT/ML
500 SOLUTION INTRAVENOUS PRN
Status: DISCONTINUED | OUTPATIENT
Start: 2022-08-10 | End: 2022-08-11 | Stop reason: HOSPADM

## 2022-08-10 RX ORDER — SODIUM CHLORIDE 9 MG/ML
25 INJECTION, SOLUTION INTRAVENOUS PRN
Status: CANCELLED | OUTPATIENT
Start: 2022-08-10

## 2022-08-10 RX ORDER — SODIUM CHLORIDE 0.9 % (FLUSH) 0.9 %
5-40 SYRINGE (ML) INJECTION PRN
Status: CANCELLED | OUTPATIENT
Start: 2022-08-10

## 2022-08-10 RX ORDER — SODIUM CHLORIDE 0.9 % (FLUSH) 0.9 %
5-40 SYRINGE (ML) INJECTION PRN
Status: DISCONTINUED | OUTPATIENT
Start: 2022-08-10 | End: 2022-08-11 | Stop reason: HOSPADM

## 2022-08-10 RX ADMIN — HEPARIN 500 UNITS: 100 SYRINGE at 09:55

## 2022-08-10 RX ADMIN — SODIUM CHLORIDE, PRESERVATIVE FREE 20 ML: 5 INJECTION INTRAVENOUS at 09:55

## 2022-10-05 ENCOUNTER — HOSPITAL ENCOUNTER (OUTPATIENT)
Dept: INFUSION THERAPY | Age: 67
Discharge: HOME | End: 2022-10-05
Payer: MEDICARE

## 2022-10-05 DIAGNOSIS — C68.9 UROTHELIAL CANCER (HCC): ICD-10-CM

## 2022-10-05 DIAGNOSIS — C65.1 MALIGNANT NEOPLASM OF RIGHT RENAL PELVIS (HCC): ICD-10-CM

## 2022-10-05 DIAGNOSIS — I87.8 POOR VENOUS ACCESS: Primary | ICD-10-CM

## 2022-10-05 LAB
HCT VFR BLD CALC: 37 % (ref 40.1–51)
HEMOGLOBIN: 12.4 G/DL (ref 13.7–17.5)
LYMPHOCYTES ABSOLUTE: 1.18 K/UL (ref 1.18–3.74)
LYMPHOCYTES RELATIVE PERCENT: 21.3 % (ref 19.3–53.1)
MCH RBC QN AUTO: 30.5 PG (ref 25.7–32.2)
MCHC RBC AUTO-ENTMCNC: 33.5 G/DL (ref 32.3–36.5)
MCV RBC AUTO: 90.9 FL (ref 79–92.2)
MONOCYTES ABSOLUTE: 0.54 K/UL (ref 0.24–0.82)
MONOCYTES RELATIVE PERCENT: 9.7 % (ref 4.7–12.5)
NEUTROPHILS ABSOLUTE: 3.4 K/UL (ref 1.56–6.13)
NEUTROPHILS RELATIVE PERCENT: 61.4 % (ref 34–71.1)
PDW BLD-RTO: 12.3 % (ref 11.6–14.4)
PLATELET # BLD: 151 K/UL (ref 163–337)
PMV BLD AUTO: 9.7 FL (ref 7.4–10.4)
RBC # BLD: 4.07 M/UL (ref 4.63–6.08)
WBC # BLD: 5.54 K/UL (ref 4.23–9.07)

## 2022-10-05 PROCEDURE — 36415 COLL VENOUS BLD VENIPUNCTURE: CPT

## 2022-10-05 PROCEDURE — 6360000002 HC RX W HCPCS: Performed by: INTERNAL MEDICINE

## 2022-10-05 PROCEDURE — 85025 COMPLETE CBC W/AUTO DIFF WBC: CPT

## 2022-10-05 PROCEDURE — 2580000003 HC RX 258: Performed by: INTERNAL MEDICINE

## 2022-10-05 PROCEDURE — 96523 IRRIG DRUG DELIVERY DEVICE: CPT

## 2022-10-05 RX ORDER — SODIUM CHLORIDE 0.9 % (FLUSH) 0.9 %
5-40 SYRINGE (ML) INJECTION PRN
OUTPATIENT
Start: 2022-10-05

## 2022-10-05 RX ORDER — SODIUM CHLORIDE 9 MG/ML
25 INJECTION, SOLUTION INTRAVENOUS PRN
OUTPATIENT
Start: 2022-10-05

## 2022-10-05 RX ORDER — SODIUM CHLORIDE 0.9 % (FLUSH) 0.9 %
5-40 SYRINGE (ML) INJECTION PRN
Status: DISCONTINUED | OUTPATIENT
Start: 2022-10-05 | End: 2022-10-06 | Stop reason: HOSPADM

## 2022-10-05 RX ORDER — HEPARIN SODIUM (PORCINE) LOCK FLUSH IV SOLN 100 UNIT/ML 100 UNIT/ML
500 SOLUTION INTRAVENOUS PRN
Status: DISCONTINUED | OUTPATIENT
Start: 2022-10-05 | End: 2022-10-06 | Stop reason: HOSPADM

## 2022-10-05 RX ORDER — HEPARIN SODIUM (PORCINE) LOCK FLUSH IV SOLN 100 UNIT/ML 100 UNIT/ML
500 SOLUTION INTRAVENOUS PRN
OUTPATIENT
Start: 2022-10-05

## 2022-10-05 RX ADMIN — SODIUM CHLORIDE, PRESERVATIVE FREE 10 ML: 5 INJECTION INTRAVENOUS at 14:55

## 2022-10-05 RX ADMIN — HEPARIN 500 UNITS: 100 SYRINGE at 14:55

## 2022-12-08 ENCOUNTER — HOSPITAL ENCOUNTER (OUTPATIENT)
Dept: CT IMAGING | Age: 67
Discharge: HOME | End: 2022-12-08
Payer: MEDICARE

## 2022-12-08 DIAGNOSIS — C68.9 UROTHELIAL CANCER (HCC): ICD-10-CM

## 2022-12-08 LAB
GFR SERPL CREATININE-BSD FRML MDRD: 54 ML/MIN/{1.73_M2}
POC CREATININE: 1.4 MG/DL (ref 0.3–1.3)

## 2022-12-08 PROCEDURE — 6360000004 HC RX CONTRAST MEDICATION: Performed by: INTERNAL MEDICINE

## 2022-12-08 PROCEDURE — 71260 CT THORAX DX C+: CPT

## 2022-12-08 PROCEDURE — 74177 CT ABD & PELVIS W/CONTRAST: CPT

## 2022-12-08 PROCEDURE — 82565 ASSAY OF CREATININE: CPT

## 2022-12-08 RX ADMIN — IOPAMIDOL 50 ML: 755 INJECTION, SOLUTION INTRAVENOUS at 08:57

## 2022-12-12 NOTE — PROGRESS NOTES
MEDICAL ONCOLOGY PROGRESS NOTE      Jennifer Abel   1955 12/13/2022     Chief Complaint   Patient presents with    Cancer    Follow-up     Urothelial cancer ([facility] [address]     INTERVAL HISTORY/HISTORY OF PRESENT ILLNESS:  The patient is a very pleasant 69years old male who has a diagnosis of recurrent high-grade urothelial carcinoma. He is a status post treatment with carboplatin, Gemzar and Keytruda followed by maintenance Keytruda. His last Keytruda treatment was delivered on 3/5/2021. He had significant blistering rash related to [place] and therefore this was placed on hold. He had recent CT scans for surveillance. He denies any symptoms. He continues to have blistering rashes which are mild and resolved by itself. Denies any abdominal pain. Denies any weight loss. Diagnosis   High-grade urothelial carcinoma of the left kidney stage IV, 2010. Recurrence July 2013   Second recurrence October 2015    Treatment summary  1/5/2010-right radical nephrectomy   Relapse February 2013-retroperitoneal soft tissue mass. Gemcitabine/cisplatin completed July 2013 with good partial response, followed by RT 5040 cGy retroperitoneal residual disease, completed October 2013. October 2015- chest wall recurrence, treated with RT   May 2020-carboplatin/Gemzar x4 cycles and Keytruda followed by [place] maintenance. 9/18/20-03/05/21 Maintenance Keytruda  5/7/21- [place] on hold due to blister rash secondary to [place]    Interval history:  The patient is a very pleasant 69years old male who has a history of urothelial carcinoma of the right renal pelvis diagnosed in 2010. The patient was diagnosed with a blistering rash secondary to [place]. [place] has been on hold since March 2021. He was seen by dermatology. He rash got significantly better but upon discontinuation of prednisone he had a recrudescence of the blistering rash. His last rash was in January 2022.  He is going to be seen by Dr. Adelina Osborn next week. Otherwise, he has been doing well. He denies any abdominal symptoms. He has gained about 4 pounds since last visit. He had CT scans performed. He is here to discuss results and further treatment recommendations. He has been doing well otherwise. Patient had scans recently. Patient still has some skin blistering lesions occasionally. They come and go. Cancer history-High-grade Urothelial carcinoma left renal pelvis  Mr Mikel Costa was seen in initial oncology consultation on 2/2/2017 as a referral by Dr. Nahomi Owens [facility] to establish continuity of care of his history of urothelial carcinoma. 7/1/20095313-[id number]-widhlffzz right renal cyst measuring 2.9 x 3.4 x 2.8 cm.   12/4/[id number]-BJ-avavqa biopsy of a right kidney mass was consistent with poorly differentiated adenocarcinoma. 2/24/2010-the patient was evaluated by Dr. Tashi Branch for a right renal tumor. 1/5/2010- He underwent a right radical nephrectomy with the findings of a poorly differentiated adenocarcinoma involving the mid pole of the right kidney measures 6.5 cm in greatest dimension. The tumor extended beyond the renal capsule into the perinephric tissues, but did not extend beyond Gerota's fascia. Margins of resection were negative. Perineural invasion present. No lymph nodes were found in the specimen. No renal vein invasion. Right ureter negative for malignancy. Right adrenal gland negative for malignancy. No lymph nodes identified. Final pathologic staging [id number] stage III. IHC staining performed at [facility] showed malignant cells to express PAX-8, p 504s and focal , with negative CK 7 and p63. The case was reviewed by Dr. Pito Lance [facility], where GATA3 was performed and reported as positive in a significant number of cells. Thus, in his consultation report, Dr Tigist Reyes favored urothelial carcinoma over collecting duct carcinoma.    2/24/2010- he was seen by Dr. Navid Phan and offer a consultation at [facility] for clinical trial, but declined. He was placed on surveillance follow-up with surveillance imaging. He had several follow-up CT scans performed in April 2010, October 2010, January 2011, July 2011, January 2012, August 2012 that were all unremarkable for disease recurrence. ------------------------------ Relapse February 2013--------------------------  2/7/2013-CT scan of the abdomen pelvis revealed retroperitoneal soft tissue density behind the inferior vena cava (2.8 x 2.7 cm) increased in size from prior CT scan on 7/27/2012 02/19/2013- PET/CT scan showed a mass extending along the pericaval lymph node chain from T12-L1 level with SUV 4. Extensive pericaval adenopathy (SUV 5.4). 3/27/2013-he was seen in consultation at [facility]. The retroperitoneal mass could not be resected. He was recommended chemotherapy. He completed palliative chemotherapy with cisplatin/gemcitabine completed on 7/26/2013.   8/7/2013-interval improvement consistent with a positive response to chemotherapy. There was a decrease in size and number of the lymph nodes as well as decrease SUV. 10/30/2013-he completed a course of RT to the retroperitoneal area receiving a total dose of 5040 cGy to the periaortic aortic lymph nodes. 11/18/2013-a CT scan of the abdomen pelvis showed slight diminishment in size in the in nodularity within the retrocaval region. 11/20/2015- He was again seen at [facility] and again resection was not recommended. 10/19/2015-a CT scan of the chest/abdomen pelvis showed new area of soft tissue abnormality in the posterior right chest wall. It measured 2.3 cm and was just medial to the right 11th rib. Another 2.7 cm density anterior to the right 12th rib suggest metastatic disease. 11/24/2015- the patient had a biopsy-proven recurrence on the right chest wall compatible with metastatic carcinoma with glandular differentiation, high-grade.  Tissue was sent to integrated oncology and consistent with metastatic poorly differentiated renal cell carcinoma. Treated with radiation therapy only   12/16/2015- abnormal metabolic activity noted between right 11th rib as described on CT scan for October. Consistent metastatic disease.  -------------- Clinical/radiological remission April 2016 --------------  4/7/2016-patient had CT scans of the chest/abdomen/pelvis which did not show any evidence of new disease compatible with complete clinical remission. 6/8/2016- he was last seen by Dr. Joceline Guallpa on this date who planned for surveillance scans in October 2016.   10/07/2016- CT scan of the chest/abdomen and pelvis did not show any evidence of metastatic disease, compatible with ongoing complete clinical remission   2/2/2017- PET/CT scan requested and Insurance denied. 3/16/2017- PET/CT scan requested but declined again. 4/18/2017 CT scan of the abdomen and pelvis-showed a stable 1.6 cm retroperitoneal adenopathy. No new evidence of metastatic disease within the abdomen pelvis. 11/29/2017-CT chest/abdomen/pelvis showed no evidence of metastatic disease, compatible with ongoing complete clinical remission. 11/26/2018-CT chest, abdomen, pelvis unremarkable for recurrent disease. 3/30/2020-Ct Chest with contrast A stable CT scan of the chest. No evidence of a neoplastic/metastatic disease. Severe atheromatous changes of coronary arteries similar to the previous study. No evidence of mediastinal or intrathoracic adenopathy. 3/30/2020-CT abdomen pelvis with contrast showed 21 x 17 mm aortocaval lymph node adjacent to the right nephrectomy bed. 4/3/2020- Pet scan showed metastatic lymphadenopathy in the right paraspinal level at T12 and in the aortocaval region. Maximum SUV is in the aortocaval lymph node and measures 14.2. 2. Indeterminate precarinal lymph node demonstrating a maximum SUV of 4.2. This can be followed with subsequent exams.   4/24/2020- EGD/EUS at [place] and FNA biopsy consistent with recurrent metastatic urothelial carcinoma. IHC positive for PAX-8, PAULY-3 and AE1/AE3. Insufficient cellularity on cellblock for ancillary molecular studies. 4/29/2020-recommended palliative/definitive RT and immunotherapy with pembrolizumab.  5/15/2020-unfortunately not a surgical candidate and not a candidate for palliative or definitive RT. Started on immunotherapy with [place] only. 7/29/2020- Ct Abdomen Pelvis W Contrast Interval decrease in size of an aortocaval lymph node near the right nephrectomy bed. Stable hypodense lesion in the right hepatic lobe. Atherosclerotic disease. Fecal stasis. 12/2/20 Ct Chest W Contrast No acute cardiopulmonary process. No evidence of metastatic disease. Vascular calcifications present aortic arch and coronary arteries. 12/2/20 Ct Abdomen Pelvis W Iv Contrast  No evidence of disease progression. Stable subcentimeter right liver lobe lesion. Stable 7 mm aortocaval lymph node. Right nephrectomy. Borderline prostate size. 4/7/21 Ct Chest W Contrast No evidence of metastatic disease in the chest region. Atheromatous disease of the thoracic aorta and coronary arteries. Heart size upper limits of normal.   4/7/21 Ct Abdomen Pelvis W Iv Contrast  No interval changes. Postsurgical changes related to prior right nephrectomy without new soft tissue density within the surgical bed to suggest locally recurrent disease. Similar hypodensity near the hepatic dome and aortocaval nodule/lymph node. There is no acute osseous pathology. No suspicious lytic or blastic lesion identified. Prior right hip arthroplasty, without hardware loosening or failure identified visualized aspect. Similar cranial screw of the arthroplasty extending beyond the cortex in the gluteal soft tissues. 4/16/2021-I reviewed C his T chest abdomen pelvis. No evidence of metastatic disease.   7/23/21 CT CHEST W CONTRAST  Stable appearance of the chest from previous study dated 4/7/2021. No radiographic evidence of metastatic disease to the thorax. 7/23/21 CT ABDOMEN PELVIS W IV CONTRAST  Stable CT of the abdomen and pelvis with evidence of previous right nephrectomy and no findings of intra-abdominal or pelvic metastatic disease. Mild circumferential thickening of the bladder wall likely due to underdistention artifact. Diffuse coronary artery calcified plaque. Moderate volume of calcified plaque within the abdominal aorta and its branches as before. Normal aortic caliber. Normal appendix. Small stable subcentimeter lesion in the liver dome favored to represent a small hemangioma. 8/6/2021-essentially, no evidence of metastatic disease  11/29/2021 CT Chest w/Contrast Stable appearance of the chest . No radiographic evidence of metastatic disease to the thorax. 11/29/2021 CT Abd/Pelvis w/ IV Contrast(Oral) Stable CT abdomen,pelvis, without evidence of recurring disease. 4/4/2022 CT Abd/Pelvis W IV Contrast (Oral) No evidence or recurrent or metastatic disease in the abdomen or pelvis. 4/4/2022 CT Chest W Contrast No evidence of metastatic disease in the chest.  8/8/2022 CT Chest W Contrast No significant acute abnormality appreciated, with findings, as above. (Compared to previous CT dated 4th April 2022 , no evidence of interval metastatic disease to the chest.).  8/8/2022 CT Abd/Pelvis W IV Contrast (Oral) Post-nephrectomy status on the right side with no recurrent/residual mass lesion at the operative bed. No interval change. No evidence of metastatic disease within the abdomen or pelvis. Small hypodensity segment VII of liver, unchanged as compared to prior, too small to characterize. Lumbar spondylosis and aortic atherosclerosis. Retained colonic fecal matter, correlate for constipation. Total hip joint replacement the right side.   08/10/22-no evidence of recurrent disease  12/8/2022 CT Chest W Contrast No evidence of metastatic disease in the chest.  12/8/22 CT Abd/Pelvis W IV Contrast (oral) No evidence of recurrent or metastatic disease in the abdomen or pelvis. PAST MEDICAL HISTORY:   Past Medical History:   Diagnosis Date    Adult BMI 29.0-29.9 kg/sq m     Anxiety     Diabetes mellitus (HCC)     Type 1 Insulin depend    HTN (hypertension)     Hypercholesteremia     Hyperlipidemia     Hypothyroidism     Malignant neoplasm of right renal pelvis (HCC)     Metastatic disease ([address]     Retinopathy     Urothelial cancer ([facility] [address] 4/29/2013          PAST SURGICAL HISTORY:  Past Surgical History:   Procedure Laterality Date    COLONOSCOPY  2010    Dr Elva Gonsales  12/04/2009    LEG SURGERY      PARTIAL NEPHRECTOMY Right 01/05/2012    VASCULAR SURGERY  04- [facility]    us guided cannulation of right internal jugular vein, right internal jugular vein single lumen port placement [employer]    WRIST SURGERY Right         SOCIAL HISTORY:  Social History     Socioeconomic History    Marital status:      Spouse name: None    Number of children: None    Years of education: None    Highest education level: None   Tobacco Use    Smoking status: Former     Types: Cigars    Smokeless tobacco: Current   Substance and Sexual Activity    Alcohol use: No    Drug use: No       FAMILY HISTORY:  Family History   Problem Relation Age of Onset    Cervical Cancer Mother     Kidney Cancer Father         Current Outpatient Medications   Medication Sig Dispense Refill    Multiple Vitamins-Minerals (PRESERVISION AREDS 2 PO) Take 2 mg by mouth in the morning and at bedtime      Insulin Lispro (HUMALOG SC) Inject into the skin      hydrOXYzine (ATARAX) 25 MG tablet TAKE ONE (1) TABLET BY MOUTH EVERY 6 HOURS AS NEEDED FOR ITCHING 60 tablet 3    lidocaine-prilocaine (EMLA) 2.5-2.5 % cream Apply topically as needed for Pain Apply topically as needed.  30 g 0    ibuprofen (ADVIL;MOTRIN) 200 MG tablet Take 1 tablet by mouth every 6 hours as needed mupirocin (BACTROBAN) 2 % ointment       simvastatin (ZOCOR) 20 MG tablet       triamcinolone (KENALOG) 0.1 % cream Apply topically 2 times daily. 80 g 5    amLODIPine (NORVASC) 5 MG tablet Take 1 tablet by mouth 2 times daily 30 tablet     Multiple Vitamins-Minerals (PRESERVISION AREDS 2) CAPS Take by mouth 2 times daily      levothyroxine (SYNTHROID) 125 MCG tablet Take 125 mcg by mouth Daily. insulin glulisine (APIDRA) 100 UNIT/ML injection Inject  into the skin nightly. (Patient not taking: Reported on 12/13/2022)       No current facility-administered medications for this visit.      Facility-Administered Medications Ordered in Other Visits   Medication Dose Route Frequency Provider Last Rate Last Admin    sodium chloride flush 0.9 % injection 5-40 mL  5-40 mL IntraVENous PRN Romain Reyes MD        heparin flush 100 UNIT/ML injection 500 Units  500 Units IntraCATHeter PRN Romain Reyes MD            REVIEW OF SYSTEMS:   CONSTITUTIONAL: no fever, no night sweats, no fatigue;  HEENT: no blurring of vision, no double vision, no hearing difficulty, no tinnitus, no ulceration, no dysplasia, no epistaxis;   LUNGS: no cough, no hemoptysis, no wheeze,  no shortness of breath;  CARDIOVASCULAR: no palpitation, no chest pain, no shortness of breath;  GI: no abdominal pain, no nausea, no vomiting, no diarrhea, no constipation;  RE: no dysuria, no hematuria, no frequency or urgency, no nephrolithiasis;  MUSCULOSKELETAL: no joint pain, no swelling, no stiffness;  ENDOCRINE: no polyuria, no polydipsia, no cold or heat intolerance;  HEMATOLOGY: no easy bruising or bleeding, no history of clotting disorder;  DERMATOLOGY: no skin rash, no eczema, no pruritus;  PSYCHIATRY: no depression, no anxiety, no panic attacks, no suicidal ideation, no homicidal ideation;  NEUROLOGY: no syncope, no seizures, no numbness or tingling of hands, no numbness or tingling of feet, no paresis;     /72   Pulse 68   Ht 5' 8\" (1.727 m)   Wt 193 lb 3.2 oz (87.6 kg)   SpO2 98%   BMI 29.38 kg/m²      PHYSICAL EXAM:  CONSTITUTIONAL: Alert, appropriate, no acute distress,   EYES: Non icteric, EOM intact, pupils equal round and reactive to light and accommodation. ENT: Oral mucus membranes moist, no oral pharyngeal lesions. External inspection of ears and nose are normal.   NECK: Supple, no masses. No palpable thyroid mass    CHEST/LUNGS: CTA bilaterally, normal respiratory effort   CARDIOVASCULAR: RRR, no murmurs. No lower extremity edema   ABDOMEN: soft non-tender, active bowel sounds, no hepatosplenomegaly. No palpable masses. EXTREMITIES: warm, Full ROM of all fours extremities. No focal weakness. SKIN: warm, dry with no rashes or lesions  LYMPH: No cervical, clavicular, axillary, or inguinal lymphadenopathy  NEUROLOGIC: follows commands, non focal.   PSYCH: mood and affect appropriate. Alert and oriented to time and place and person. Relevant Lab findings/reviewed by me:  12/13/22 CBC  WBC 7.04  HGB 13.8    Neut 4.74    Relevant Imaging studies/reviewed by me:  12/8/2022 CT Chest W Contrast No evidence of metastatic disease in the chest.    12/8/22 CT Abd/Pelvis W IV Contrast (oral) No evidence of recurrent or metastatic disease in the abdomen or pelvis. ASSESSMENT:    No orders of the defined types were placed in this encounter. Zena Webb was seen today for cancer and follow-up. Diagnoses and all orders for this visit:    Urothelial cancer ([address]    Malignant neoplasm of right renal pelvis ([address]    Vaccine counseling        #Urothelial carcinoma upper urinary tract, PDL-1 100%. Target lesion: retroperitoneal LN-now measuring 7 mm  No candidate for further RT. Patient was receiving Keytruda but developed skin rash. Development of blistering disorder likely related to [facility]. Continue to hold Keytruda secondary to blistering skin rash. 4/4/2022-CT C/A/P-No evidence of recurrent disease.   Continue clinical surveillance/radiological surveillance    Treatment related toxicity-skin blistering rash. He noticed another rash in his right upper back. The rash has resolved spontaneously. He is followed by dermatology. Clinical/laboratory assessment of toxicity         At risk thyroid disorder-TSH was suppressed 0.6 1/22/2021. Patient currently taking 125mcg levothyroxine. TSH 0.269. CKD stage IIIA-Rickey also has chronic kidney disease stage III. He continues to deny any urinary symptoms to include hematuria. His creatinine is stable at 1.5/GFR 47. Hypertension-uncontrolled   /72   Pulse 68   Ht 5' 8\" (1.727 m)   Wt 193 lb 3.2 oz (87.6 kg)   SpO2 98%   BMI 29.38 kg/m²      Hyperglycemia-controlled. Follow-up with PCP. Patient has insulin pump. Bullous Rash- likely related to [place] Resolved and healed. Urgent referral to dermatology as per NCCN guidelines recommendations. Development of blistering disorder likely related to [place] [place]. Continue to hold Keytruda. Patient's been seen by dermatology. Immunization counseling-recommended shingles shot        Plan:  RTC with MD 4 months  Will continue to hold Keytruda  Repeat CT scans in 6 months, May 2023. Continue follow-up with Dr. Andrew Hadely   Continue lidocaine-prilocaine cream  Continue Triamcinolone cream  Recommend [place]/Flu vaccination  Follow up with Dr Brittnee Ryan/dermatology  [facility] flu today and every 8 weeks  Check TSH today      Follow Up:     Return in about 4 months (around 4/13/2023) for CBC, Appointment with Dr. Eduard Callahan. [facility] Flush every 8 weeks      Mariola ZAMORA am pre charting  as Medical Assistant for Rakel Hong MD. Electronically signed by Mariola Garcia MA on 12/13/2022 at 2:00 PM CST. Kristina Officermaria isabel scribing as Medical Assistant for Rakel Hong MD. Electronically signed by Mariola Garcia MA on 12/13/2022 at 1:32 PM CST.       Dr Darlene ZAMORA, personally performed the services described in this documentation as scribed by Pawan Hurst MA in my presence and is both accurate and complete. I have seen, examined and reviewed this patient medication list, appropriate labs and imaging studies. I reviewed relevant medical records and others physicians notes. I discussed the plans of care with the patient. I answered all the questions to the patients satisfaction. I have also reviewed the chief complaint (CC) and part of the history (History of Present Illness (HPI), Past Family Social History [facility]), or Review of Systems (ROS) and made changes when appropriated.        (Please note that portions of this note were completed with a voice recognition program. Efforts were made to edit the dictations but occasionally words are mis-transcribed.)  Electronically signed by Monty Garcia MD on 12/13/2022 at 1:55 PM

## 2022-12-13 ENCOUNTER — OFFICE VISIT (OUTPATIENT)
Dept: HEMATOLOGY | Age: 67
End: 2022-12-13
Payer: MEDICARE

## 2022-12-13 ENCOUNTER — HOSPITAL ENCOUNTER (OUTPATIENT)
Dept: INFUSION THERAPY | Age: 67
Discharge: HOME | End: 2022-12-13
Payer: MEDICARE

## 2022-12-13 VITALS
OXYGEN SATURATION: 98 % | HEART RATE: 68 BPM | HEIGHT: 68 IN | WEIGHT: 193.2 LBS | SYSTOLIC BLOOD PRESSURE: 138 MMHG | BODY MASS INDEX: 29.28 KG/M2 | DIASTOLIC BLOOD PRESSURE: 72 MMHG

## 2022-12-13 DIAGNOSIS — I87.8 POOR VENOUS ACCESS: Primary | ICD-10-CM

## 2022-12-13 DIAGNOSIS — C65.1 MALIGNANT NEOPLASM OF RIGHT RENAL PELVIS (HCC): ICD-10-CM

## 2022-12-13 DIAGNOSIS — C68.9 UROTHELIAL CANCER (HCC): Primary | ICD-10-CM

## 2022-12-13 DIAGNOSIS — Z71.85 VACCINE COUNSELING: ICD-10-CM

## 2022-12-13 DIAGNOSIS — C68.9 UROTHELIAL CANCER (HCC): ICD-10-CM

## 2022-12-13 LAB
BASOPHILS ABSOLUTE: 0.07 K/UL (ref 0.01–0.08)
BASOPHILS RELATIVE PERCENT: 1 % (ref 0.1–1.2)
EOSINOPHILS ABSOLUTE: 0.5 K/UL (ref 0.04–0.54)
EOSINOPHILS RELATIVE PERCENT: 7.1 % (ref 0.7–7)
HCT VFR BLD CALC: 40.6 % (ref 40.1–51)
HEMOGLOBIN: 13.8 G/DL (ref 13.7–17.5)
LYMPHOCYTES ABSOLUTE: 1.17 K/UL (ref 1.18–3.74)
LYMPHOCYTES RELATIVE PERCENT: 16.6 % (ref 19.3–53.1)
MCH RBC QN AUTO: 30.5 PG (ref 25.7–32.2)
MCHC RBC AUTO-ENTMCNC: 34 G/DL (ref 32.3–36.5)
MCV RBC AUTO: 89.8 FL (ref 79–92.2)
MONOCYTES ABSOLUTE: 0.54 K/UL (ref 0.24–0.82)
MONOCYTES RELATIVE PERCENT: 7.7 % (ref 4.7–12.5)
NEUTROPHILS ABSOLUTE: 4.74 K/UL (ref 1.56–6.13)
NEUTROPHILS RELATIVE PERCENT: 67.3 % (ref 34–71.1)
PDW BLD-RTO: 12.3 % (ref 11.6–14.4)
PLATELET # BLD: 125 K/UL (ref 163–337)
PMV BLD AUTO: 10.7 FL (ref 7.4–10.4)
RBC # BLD: 4.52 M/UL (ref 4.63–6.08)
WBC # BLD: 7.04 K/UL (ref 4.23–9.07)

## 2022-12-13 PROCEDURE — 36415 COLL VENOUS BLD VENIPUNCTURE: CPT

## 2022-12-13 PROCEDURE — 6360000002 HC RX W HCPCS: Performed by: INTERNAL MEDICINE

## 2022-12-13 PROCEDURE — 99212 OFFICE O/P EST SF 10 MIN: CPT

## 2022-12-13 PROCEDURE — 2580000003 HC RX 258: Performed by: INTERNAL MEDICINE

## 2022-12-13 PROCEDURE — 1123F ACP DISCUSS/DSCN MKR DOCD: CPT | Performed by: INTERNAL MEDICINE

## 2022-12-13 PROCEDURE — 85025 COMPLETE CBC W/AUTO DIFF WBC: CPT

## 2022-12-13 PROCEDURE — 99213 OFFICE O/P EST LOW 20 MIN: CPT | Performed by: INTERNAL MEDICINE

## 2022-12-13 PROCEDURE — G8484 FLU IMMUNIZE NO ADMIN: HCPCS | Performed by: INTERNAL MEDICINE

## 2022-12-13 PROCEDURE — G8417 CALC BMI ABV UP PARAM F/U: HCPCS | Performed by: INTERNAL MEDICINE

## 2022-12-13 PROCEDURE — 4004F PT TOBACCO SCREEN RCVD TLK: CPT | Performed by: INTERNAL MEDICINE

## 2022-12-13 PROCEDURE — 96523 IRRIG DRUG DELIVERY DEVICE: CPT

## 2022-12-13 PROCEDURE — G8427 DOCREV CUR MEDS BY ELIG CLIN: HCPCS | Performed by: INTERNAL MEDICINE

## 2022-12-13 PROCEDURE — 3017F COLORECTAL CA SCREEN DOC REV: CPT | Performed by: INTERNAL MEDICINE

## 2022-12-13 RX ORDER — HEPARIN SODIUM (PORCINE) LOCK FLUSH IV SOLN 100 UNIT/ML 100 UNIT/ML
500 SOLUTION INTRAVENOUS PRN
Status: DISCONTINUED | OUTPATIENT
Start: 2022-12-13 | End: 2022-12-14 | Stop reason: HOSPADM

## 2022-12-13 RX ORDER — SODIUM CHLORIDE 0.9 % (FLUSH) 0.9 %
5-40 SYRINGE (ML) INJECTION PRN
Status: DISCONTINUED | OUTPATIENT
Start: 2022-12-13 | End: 2022-12-14 | Stop reason: HOSPADM

## 2022-12-13 RX ORDER — WATER 1000 ML/1000ML
2.2 INJECTION, SOLUTION INTRAVENOUS ONCE
OUTPATIENT
Start: 2022-12-13 | End: 2022-12-13

## 2022-12-13 RX ORDER — SODIUM CHLORIDE 0.9 % (FLUSH) 0.9 %
5-40 SYRINGE (ML) INJECTION PRN
OUTPATIENT
Start: 2022-12-13

## 2022-12-13 RX ORDER — HEPARIN SODIUM (PORCINE) LOCK FLUSH IV SOLN 100 UNIT/ML 100 UNIT/ML
500 SOLUTION INTRAVENOUS PRN
OUTPATIENT
Start: 2022-12-13

## 2022-12-13 RX ORDER — SODIUM CHLORIDE 9 MG/ML
25 INJECTION, SOLUTION INTRAVENOUS PRN
OUTPATIENT
Start: 2022-12-13

## 2022-12-13 RX ADMIN — SODIUM CHLORIDE, PRESERVATIVE FREE 20 ML: 5 INJECTION INTRAVENOUS at 14:22

## 2022-12-13 RX ADMIN — HEPARIN 500 UNITS: 100 SYRINGE at 14:22

## 2023-02-07 ENCOUNTER — HOSPITAL ENCOUNTER (OUTPATIENT)
Dept: INFUSION THERAPY | Age: 68
Discharge: HOME | End: 2023-02-07
Payer: MEDICARE

## 2023-02-07 DIAGNOSIS — I87.8 POOR VENOUS ACCESS: Primary | ICD-10-CM

## 2023-02-07 PROCEDURE — 2580000003 HC RX 258: Performed by: INTERNAL MEDICINE

## 2023-02-07 PROCEDURE — 6360000002 HC RX W HCPCS: Performed by: INTERNAL MEDICINE

## 2023-02-07 PROCEDURE — 96523 IRRIG DRUG DELIVERY DEVICE: CPT

## 2023-02-07 RX ORDER — SODIUM CHLORIDE 0.9 % (FLUSH) 0.9 %
5-40 SYRINGE (ML) INJECTION PRN
OUTPATIENT
Start: 2023-02-07

## 2023-02-07 RX ORDER — HEPARIN SODIUM (PORCINE) LOCK FLUSH IV SOLN 100 UNIT/ML 100 UNIT/ML
500 SOLUTION INTRAVENOUS PRN
Status: DISCONTINUED | OUTPATIENT
Start: 2023-02-07 | End: 2023-02-08 | Stop reason: HOSPADM

## 2023-02-07 RX ORDER — WATER 1000 ML/1000ML
2.2 INJECTION, SOLUTION INTRAVENOUS ONCE
OUTPATIENT
Start: 2023-02-07 | End: 2023-02-07

## 2023-02-07 RX ORDER — HEPARIN SODIUM (PORCINE) LOCK FLUSH IV SOLN 100 UNIT/ML 100 UNIT/ML
500 SOLUTION INTRAVENOUS PRN
OUTPATIENT
Start: 2023-02-07

## 2023-02-07 RX ORDER — SODIUM CHLORIDE 0.9 % (FLUSH) 0.9 %
5-40 SYRINGE (ML) INJECTION PRN
Status: DISCONTINUED | OUTPATIENT
Start: 2023-02-07 | End: 2023-02-08 | Stop reason: HOSPADM

## 2023-02-07 RX ORDER — SODIUM CHLORIDE 9 MG/ML
25 INJECTION, SOLUTION INTRAVENOUS PRN
OUTPATIENT
Start: 2023-02-07

## 2023-02-07 RX ADMIN — HEPARIN 500 UNITS: 100 SYRINGE at 09:04

## 2023-02-07 RX ADMIN — SODIUM CHLORIDE, PRESERVATIVE FREE 10 ML: 5 INJECTION INTRAVENOUS at 09:04

## 2023-03-03 RX ORDER — HYDROXYZINE HYDROCHLORIDE 25 MG/1
TABLET, FILM COATED ORAL
Qty: 60 TABLET | Refills: 3 | Status: SHIPPED | OUTPATIENT
Start: 2023-03-03

## 2023-03-30 DIAGNOSIS — Z45.2 ENCOUNTER FOR CENTRAL LINE CARE: ICD-10-CM

## 2023-03-30 RX ORDER — SODIUM CHLORIDE 0.9 % (FLUSH) 0.9 %
5-40 SYRINGE (ML) INJECTION PRN
OUTPATIENT
Start: 2023-03-30

## 2023-03-30 RX ORDER — HEPARIN SODIUM (PORCINE) LOCK FLUSH IV SOLN 100 UNIT/ML 100 UNIT/ML
500 SOLUTION INTRAVENOUS PRN
OUTPATIENT
Start: 2023-03-30

## 2023-04-18 ENCOUNTER — HOSPITAL ENCOUNTER (OUTPATIENT)
Dept: INFUSION THERAPY | Age: 68
Discharge: HOME | End: 2023-04-18
Payer: MEDICARE

## 2023-04-18 ENCOUNTER — OFFICE VISIT (OUTPATIENT)
Dept: HEMATOLOGY | Age: 68
End: 2023-04-18
Payer: MEDICARE

## 2023-04-18 VITALS
BODY MASS INDEX: 30.76 KG/M2 | SYSTOLIC BLOOD PRESSURE: 137 MMHG | DIASTOLIC BLOOD PRESSURE: 86 MMHG | RESPIRATION RATE: 18 BRPM | OXYGEN SATURATION: 98 % | TEMPERATURE: 97.8 F | WEIGHT: 196 LBS | HEART RATE: 69 BPM | HEIGHT: 67 IN

## 2023-04-18 DIAGNOSIS — Z45.2 ENCOUNTER FOR CENTRAL LINE CARE: ICD-10-CM

## 2023-04-18 DIAGNOSIS — C68.9 UROTHELIAL CANCER (HCC): Primary | ICD-10-CM

## 2023-04-18 DIAGNOSIS — Z71.89 CARE PLAN DISCUSSED WITH PATIENT: ICD-10-CM

## 2023-04-18 DIAGNOSIS — C65.1 MALIGNANT NEOPLASM OF RIGHT RENAL PELVIS (HCC): ICD-10-CM

## 2023-04-18 DIAGNOSIS — I87.8 POOR VENOUS ACCESS: ICD-10-CM

## 2023-04-18 LAB
BASOPHILS # BLD: 0.05 K/UL (ref 0.01–0.08)
BASOPHILS NFR BLD: 0.7 % (ref 0.1–1.2)
EOSINOPHIL # BLD: 0.37 K/UL (ref 0.04–0.54)
EOSINOPHIL NFR BLD: 5.2 % (ref 0.7–7)
ERYTHROCYTE [DISTWIDTH] IN BLOOD BY AUTOMATED COUNT: 12.3 % (ref 11.6–14.4)
HCT VFR BLD AUTO: 41.6 % (ref 40.1–51)
HGB BLD-MCNC: 13.9 G/DL (ref 13.7–17.5)
LYMPHOCYTES # BLD: 0.94 K/UL (ref 1.18–3.74)
LYMPHOCYTES NFR BLD: 13.2 % (ref 19.3–53.1)
MCH RBC QN AUTO: 30.5 PG (ref 25.7–32.2)
MCHC RBC AUTO-ENTMCNC: 33.4 G/DL (ref 32.3–36.5)
MCV RBC AUTO: 91.2 FL (ref 79–92.2)
MONOCYTES # BLD: 0.52 K/UL (ref 0.24–0.82)
MONOCYTES NFR BLD: 7.3 % (ref 4.7–12.5)
NEUTROPHILS # BLD: 5.21 K/UL (ref 1.56–6.13)
NEUTS SEG NFR BLD: 73.3 % (ref 34–71.1)
PLATELET # BLD AUTO: 132 K/UL (ref 163–337)
PMV BLD AUTO: 10.8 FL (ref 7.4–10.4)
RBC # BLD AUTO: 4.56 M/UL (ref 4.63–6.08)
WBC # BLD AUTO: 7.11 K/UL (ref 4.23–9.07)

## 2023-04-18 PROCEDURE — 2580000003 HC RX 258: Performed by: INTERNAL MEDICINE

## 2023-04-18 PROCEDURE — 36415 COLL VENOUS BLD VENIPUNCTURE: CPT

## 2023-04-18 PROCEDURE — 6360000002 HC RX W HCPCS: Performed by: INTERNAL MEDICINE

## 2023-04-18 PROCEDURE — 96523 IRRIG DRUG DELIVERY DEVICE: CPT

## 2023-04-18 PROCEDURE — G8427 DOCREV CUR MEDS BY ELIG CLIN: HCPCS | Performed by: INTERNAL MEDICINE

## 2023-04-18 PROCEDURE — 99213 OFFICE O/P EST LOW 20 MIN: CPT | Performed by: INTERNAL MEDICINE

## 2023-04-18 PROCEDURE — 4004F PT TOBACCO SCREEN RCVD TLK: CPT | Performed by: INTERNAL MEDICINE

## 2023-04-18 PROCEDURE — 3017F COLORECTAL CA SCREEN DOC REV: CPT | Performed by: INTERNAL MEDICINE

## 2023-04-18 PROCEDURE — 99212 OFFICE O/P EST SF 10 MIN: CPT

## 2023-04-18 PROCEDURE — 85025 COMPLETE CBC W/AUTO DIFF WBC: CPT

## 2023-04-18 PROCEDURE — G8417 CALC BMI ABV UP PARAM F/U: HCPCS | Performed by: INTERNAL MEDICINE

## 2023-04-18 PROCEDURE — 1123F ACP DISCUSS/DSCN MKR DOCD: CPT | Performed by: INTERNAL MEDICINE

## 2023-04-18 RX ORDER — SODIUM CHLORIDE 0.9 % (FLUSH) 0.9 %
5-40 SYRINGE (ML) INJECTION PRN
Status: DISCONTINUED | OUTPATIENT
Start: 2023-04-18 | End: 2023-04-19 | Stop reason: HOSPADM

## 2023-04-18 RX ORDER — HEPARIN SODIUM (PORCINE) LOCK FLUSH IV SOLN 100 UNIT/ML 100 UNIT/ML
500 SOLUTION INTRAVENOUS PRN
Status: DISCONTINUED | OUTPATIENT
Start: 2023-04-18 | End: 2023-04-19 | Stop reason: HOSPADM

## 2023-04-18 RX ORDER — LEVOTHYROXINE SODIUM 137 UG/1
TABLET ORAL
COMMUNITY
Start: 2023-03-15

## 2023-04-18 RX ORDER — SODIUM CHLORIDE 0.9 % (FLUSH) 0.9 %
5-40 SYRINGE (ML) INJECTION PRN
OUTPATIENT
Start: 2023-04-18

## 2023-04-18 RX ORDER — HEPARIN SODIUM (PORCINE) LOCK FLUSH IV SOLN 100 UNIT/ML 100 UNIT/ML
500 SOLUTION INTRAVENOUS PRN
OUTPATIENT
Start: 2023-04-18

## 2023-04-18 RX ADMIN — SODIUM CHLORIDE, PRESERVATIVE FREE 20 ML: 5 INJECTION INTRAVENOUS at 10:59

## 2023-04-18 RX ADMIN — HEPARIN 500 UNITS: 100 SYRINGE at 10:59

## 2023-05-12 ENCOUNTER — HOSPITAL ENCOUNTER (OUTPATIENT)
Dept: CT IMAGING | Age: 68
Discharge: HOME | End: 2023-05-12
Payer: MEDICARE

## 2023-05-12 DIAGNOSIS — C68.9 UROTHELIAL CANCER (HCC): ICD-10-CM

## 2023-05-12 LAB — CREAT SERPL-MCNC: 1.7 MG/DL (ref 0.3–1.3)

## 2023-05-12 PROCEDURE — 71260 CT THORAX DX C+: CPT

## 2023-05-12 PROCEDURE — 71260 CT THORAX DX C+: CPT | Performed by: RADIOLOGY

## 2023-05-12 PROCEDURE — 74177 CT ABD & PELVIS W/CONTRAST: CPT

## 2023-05-12 PROCEDURE — 6360000004 HC RX CONTRAST MEDICATION: Performed by: INTERNAL MEDICINE

## 2023-05-12 PROCEDURE — 74177 CT ABD & PELVIS W/CONTRAST: CPT | Performed by: RADIOLOGY

## 2023-05-12 PROCEDURE — 82565 ASSAY OF CREATININE: CPT

## 2023-05-12 RX ADMIN — IOPAMIDOL 50 ML: 755 INJECTION, SOLUTION INTRAVENOUS at 08:25

## 2023-06-13 ENCOUNTER — HOSPITAL ENCOUNTER (OUTPATIENT)
Dept: INFUSION THERAPY | Age: 68
Discharge: HOME | End: 2023-06-13
Payer: MEDICARE

## 2023-06-13 DIAGNOSIS — I87.8 POOR VENOUS ACCESS: ICD-10-CM

## 2023-06-13 DIAGNOSIS — Z45.2 ENCOUNTER FOR CENTRAL LINE CARE: Primary | ICD-10-CM

## 2023-06-13 PROCEDURE — 2580000003 HC RX 258: Performed by: INTERNAL MEDICINE

## 2023-06-13 PROCEDURE — 96523 IRRIG DRUG DELIVERY DEVICE: CPT

## 2023-06-13 PROCEDURE — 6360000002 HC RX W HCPCS: Performed by: INTERNAL MEDICINE

## 2023-06-13 RX ORDER — SODIUM CHLORIDE 0.9 % (FLUSH) 0.9 %
5-40 SYRINGE (ML) INJECTION PRN
OUTPATIENT
Start: 2023-06-13

## 2023-06-13 RX ORDER — SODIUM CHLORIDE 0.9 % (FLUSH) 0.9 %
5-40 SYRINGE (ML) INJECTION PRN
Status: DISCONTINUED | OUTPATIENT
Start: 2023-06-13 | End: 2023-06-14 | Stop reason: HOSPADM

## 2023-06-13 RX ORDER — HEPARIN SODIUM 100 [USP'U]/ML
500 INJECTION, SOLUTION INTRAVENOUS PRN
Status: DISCONTINUED | OUTPATIENT
Start: 2023-06-13 | End: 2023-06-14 | Stop reason: HOSPADM

## 2023-06-13 RX ORDER — HEPARIN SODIUM 100 [USP'U]/ML
500 INJECTION, SOLUTION INTRAVENOUS PRN
OUTPATIENT
Start: 2023-06-13

## 2023-06-13 RX ADMIN — HEPARIN 500 UNITS: 100 SYRINGE at 07:51

## 2023-06-13 RX ADMIN — SODIUM CHLORIDE, PRESERVATIVE FREE 10 ML: 5 INJECTION INTRAVENOUS at 07:51

## 2023-09-19 ENCOUNTER — TELEPHONE (OUTPATIENT)
Dept: HEMATOLOGY | Age: 68
End: 2023-09-19

## 2023-09-19 DIAGNOSIS — C68.9 UROTHELIAL CANCER (HCC): Primary | ICD-10-CM

## 2023-09-19 DIAGNOSIS — C65.1 MALIGNANT NEOPLASM OF RIGHT RENAL PELVIS (HCC): ICD-10-CM

## 2023-09-20 ENCOUNTER — HOSPITAL ENCOUNTER (OUTPATIENT)
Dept: INFUSION THERAPY | Age: 68
Discharge: HOME | End: 2023-09-20
Payer: MEDICARE

## 2023-09-20 ENCOUNTER — OFFICE VISIT (OUTPATIENT)
Dept: HEMATOLOGY | Age: 68
End: 2023-09-20
Payer: MEDICARE

## 2023-09-20 VITALS
HEART RATE: 67 BPM | SYSTOLIC BLOOD PRESSURE: 138 MMHG | HEIGHT: 68 IN | BODY MASS INDEX: 29.86 KG/M2 | DIASTOLIC BLOOD PRESSURE: 72 MMHG | WEIGHT: 197 LBS | OXYGEN SATURATION: 97 %

## 2023-09-20 DIAGNOSIS — Z71.89 CARE PLAN DISCUSSED WITH PATIENT: ICD-10-CM

## 2023-09-20 DIAGNOSIS — I87.8 POOR VENOUS ACCESS: ICD-10-CM

## 2023-09-20 DIAGNOSIS — C65.1 MALIGNANT NEOPLASM OF RIGHT RENAL PELVIS (HCC): ICD-10-CM

## 2023-09-20 DIAGNOSIS — C68.9 UROTHELIAL CANCER (HCC): Primary | ICD-10-CM

## 2023-09-20 DIAGNOSIS — Z45.2 ENCOUNTER FOR CENTRAL LINE CARE: ICD-10-CM

## 2023-09-20 LAB
BASOPHILS # BLD: 0.06 K/UL (ref 0.01–0.08)
BASOPHILS NFR BLD: 0.8 % (ref 0.1–1.2)
EOSINOPHIL # BLD: 0.48 K/UL (ref 0.04–0.54)
EOSINOPHIL NFR BLD: 6.5 % (ref 0.7–7)
ERYTHROCYTE [DISTWIDTH] IN BLOOD BY AUTOMATED COUNT: 12.1 % (ref 11.6–14.4)
HCT VFR BLD AUTO: 38 % (ref 40.1–51)
HGB BLD-MCNC: 13.7 G/DL (ref 13.7–17.5)
LYMPHOCYTES # BLD: 0.98 K/UL (ref 1.18–3.74)
LYMPHOCYTES NFR BLD: 13.3 % (ref 19.3–53.1)
MCH RBC QN AUTO: 30.4 PG (ref 25.7–32.2)
MCHC RBC AUTO-ENTMCNC: 36.1 G/DL (ref 32.3–36.5)
MCV RBC AUTO: 84.3 FL (ref 79–92.2)
MONOCYTES # BLD: 0.86 K/UL (ref 0.24–0.82)
MONOCYTES NFR BLD: 11.7 % (ref 4.7–12.5)
NEUTROPHILS # BLD: 4.98 K/UL (ref 1.56–6.13)
NEUTS SEG NFR BLD: 67.4 % (ref 34–71.1)
PLATELET # BLD AUTO: 158 K/UL (ref 163–337)
PMV BLD AUTO: 10.4 FL (ref 7.4–10.4)
RBC # BLD AUTO: 4.51 M/UL (ref 4.63–6.08)
WBC # BLD AUTO: 7.38 K/UL (ref 4.23–9.07)

## 2023-09-20 PROCEDURE — 1123F ACP DISCUSS/DSCN MKR DOCD: CPT | Performed by: INTERNAL MEDICINE

## 2023-09-20 PROCEDURE — 6360000002 HC RX W HCPCS: Performed by: INTERNAL MEDICINE

## 2023-09-20 PROCEDURE — G8427 DOCREV CUR MEDS BY ELIG CLIN: HCPCS | Performed by: INTERNAL MEDICINE

## 2023-09-20 PROCEDURE — 4004F PT TOBACCO SCREEN RCVD TLK: CPT | Performed by: INTERNAL MEDICINE

## 2023-09-20 PROCEDURE — 2580000003 HC RX 258: Performed by: INTERNAL MEDICINE

## 2023-09-20 PROCEDURE — 36415 COLL VENOUS BLD VENIPUNCTURE: CPT

## 2023-09-20 PROCEDURE — G8417 CALC BMI ABV UP PARAM F/U: HCPCS | Performed by: INTERNAL MEDICINE

## 2023-09-20 PROCEDURE — 99212 OFFICE O/P EST SF 10 MIN: CPT

## 2023-09-20 PROCEDURE — 96523 IRRIG DRUG DELIVERY DEVICE: CPT

## 2023-09-20 PROCEDURE — 99213 OFFICE O/P EST LOW 20 MIN: CPT | Performed by: INTERNAL MEDICINE

## 2023-09-20 PROCEDURE — 85025 COMPLETE CBC W/AUTO DIFF WBC: CPT

## 2023-09-20 PROCEDURE — 3017F COLORECTAL CA SCREEN DOC REV: CPT | Performed by: INTERNAL MEDICINE

## 2023-09-20 RX ORDER — SODIUM CHLORIDE 0.9 % (FLUSH) 0.9 %
5-40 SYRINGE (ML) INJECTION PRN
OUTPATIENT
Start: 2023-09-20

## 2023-09-20 RX ORDER — HEPARIN 100 UNIT/ML
500 SYRINGE INTRAVENOUS PRN
OUTPATIENT
Start: 2023-09-20

## 2023-09-20 RX ORDER — SODIUM CHLORIDE 0.9 % (FLUSH) 0.9 %
5-40 SYRINGE (ML) INJECTION PRN
Status: DISCONTINUED | OUTPATIENT
Start: 2023-09-20 | End: 2023-09-21 | Stop reason: HOSPADM

## 2023-09-20 RX ORDER — HEPARIN 100 UNIT/ML
500 SYRINGE INTRAVENOUS PRN
Status: DISCONTINUED | OUTPATIENT
Start: 2023-09-20 | End: 2023-09-21 | Stop reason: HOSPADM

## 2023-09-20 RX ADMIN — SODIUM CHLORIDE, PRESERVATIVE FREE 10 ML: 5 INJECTION INTRAVENOUS at 08:37

## 2023-09-20 RX ADMIN — HEPARIN 500 UNITS: 100 SYRINGE at 08:37

## 2023-10-31 RX ORDER — HYDROXYZINE HYDROCHLORIDE 25 MG/1
TABLET, FILM COATED ORAL
Qty: 60 TABLET | Refills: 3 | Status: SHIPPED | OUTPATIENT
Start: 2023-10-31

## 2023-11-15 ENCOUNTER — HOSPITAL ENCOUNTER (OUTPATIENT)
Dept: CT IMAGING | Age: 68
Discharge: HOME | End: 2023-11-15
Payer: MEDICARE

## 2023-11-15 DIAGNOSIS — C68.9 UROTHELIAL CANCER (HCC): ICD-10-CM

## 2023-11-15 DIAGNOSIS — C65.1 MALIGNANT NEOPLASM OF RIGHT RENAL PELVIS (HCC): ICD-10-CM

## 2023-11-15 LAB — CREAT SERPL-MCNC: 1.6 MG/DL (ref 0.3–1.3)

## 2023-11-15 PROCEDURE — 71260 CT THORAX DX C+: CPT

## 2023-11-15 PROCEDURE — 6360000004 HC RX CONTRAST MEDICATION: Performed by: INTERNAL MEDICINE

## 2023-11-15 PROCEDURE — 74177 CT ABD & PELVIS W/CONTRAST: CPT

## 2023-11-15 PROCEDURE — 82565 ASSAY OF CREATININE: CPT

## 2023-11-15 RX ADMIN — IOPAMIDOL 50 ML: 755 INJECTION, SOLUTION INTRAVENOUS at 09:22

## 2023-11-20 DIAGNOSIS — C68.9 UROTHELIAL CANCER (HCC): Primary | ICD-10-CM

## 2023-11-20 DIAGNOSIS — C65.1 MALIGNANT NEOPLASM OF RIGHT RENAL PELVIS (HCC): ICD-10-CM

## 2023-11-20 NOTE — PROGRESS NOTES
K 4.6 12/08/2021     12/08/2021    CO2 26 12/08/2021    BUN 24 (H) 12/08/2021    CREATININE 1.6 (H) 11/15/2023    GLUCOSE 102 12/08/2021    CALCIUM 10.1 12/08/2021    PROT 7.7 12/08/2021    LABALBU 4.5 12/08/2021    BILITOT 1.0 12/08/2021    ALKPHOS 122 12/08/2021    AST 26 12/08/2021    ALT 15 (L) 12/08/2021    LABGLOM 46 (A) 11/15/2023    GFRAA >60 04/04/2022    GLOB 3.2 12/08/2021       Relevant Imaging studies/reviewed by me:  11/15/23 CT Chest W Contrast  No CT evidence of metastatic disease. 11/15/23 CT Abd/Pelvis W IV Contrast  Stable exam.  Postop changes of right nephrectomy. There is stable 1 cm low density focus posterior aspect of the right hepatic lobe unchanged. No evidence for recurrent or metastatic disease in the abdomen or pelvis. Ancillary findings as above. ASSESSMENT:    Orders Placed This Encounter   Procedures    CT ABDOMEN PELVIS W IV CONTRAST Additional Contrast? Oral     Standing Status:   Future     Standing Expiration Date:   11/22/2024     Order Specific Question:   Additional Contrast?     Answer:   Oral     Order Specific Question:   STAT Creatinine as needed:     Answer:   No     Order Specific Question:   Reason for exam:     Answer:   PLEASE COMPARE TO PREVIOUS SCANS TO ASSESS RESPONSE TO TREATMENT    CT CHEST W CONTRAST     Standing Status:   Future     Standing Expiration Date:   11/22/2024     Order Specific Question:   STAT Creatinine as needed:     Answer:   No     Order Specific Question:   Reason for exam:     Answer:   PLEASE COMPARE TO ASSESS REPONSE TO TREATMENT          Helga Jacobo was seen today for follow-up. Diagnoses and all orders for this visit:    Malignant neoplasm of right renal pelvis ([address])  -     CT ABDOMEN PELVIS W IV CONTRAST Additional Contrast? Oral; Future  -     CT CHEST W CONTRAST;  Future    Encounter for central line care  -     Cancel: heparin 100 UNIT/ML injection 500 Units    Poor venous access  -     Cancel: heparin 100 UNIT/ML

## 2023-11-21 ENCOUNTER — TELEPHONE (OUTPATIENT)
Dept: HEMATOLOGY | Age: 68
End: 2023-11-21

## 2023-11-21 NOTE — TELEPHONE ENCOUNTER
Called patient to remind them of their appointment on 11/22/23. Detailed voicemail was left with appointment date and time.

## 2023-11-22 ENCOUNTER — OFFICE VISIT (OUTPATIENT)
Dept: HEMATOLOGY | Age: 68
End: 2023-11-22
Payer: MEDICARE

## 2023-11-22 ENCOUNTER — HOSPITAL ENCOUNTER (OUTPATIENT)
Dept: INFUSION THERAPY | Age: 68
Discharge: HOME | End: 2023-11-22
Payer: MEDICARE

## 2023-11-22 VITALS
OXYGEN SATURATION: 97 % | SYSTOLIC BLOOD PRESSURE: 162 MMHG | DIASTOLIC BLOOD PRESSURE: 80 MMHG | HEART RATE: 77 BPM | WEIGHT: 194 LBS | TEMPERATURE: 97.7 F | BODY MASS INDEX: 29.94 KG/M2

## 2023-11-22 DIAGNOSIS — Z71.89 CARE PLAN DISCUSSED WITH PATIENT: ICD-10-CM

## 2023-11-22 DIAGNOSIS — Z45.2 ENCOUNTER FOR CENTRAL LINE CARE: ICD-10-CM

## 2023-11-22 DIAGNOSIS — C65.1 MALIGNANT NEOPLASM OF RIGHT RENAL PELVIS (HCC): ICD-10-CM

## 2023-11-22 DIAGNOSIS — C65.1 MALIGNANT NEOPLASM OF RIGHT RENAL PELVIS (HCC): Primary | ICD-10-CM

## 2023-11-22 DIAGNOSIS — I87.8 POOR VENOUS ACCESS: ICD-10-CM

## 2023-11-22 DIAGNOSIS — C68.9 UROTHELIAL CANCER (HCC): ICD-10-CM

## 2023-11-22 DIAGNOSIS — C68.9 UROTHELIAL CANCER (HCC): Primary | ICD-10-CM

## 2023-11-22 LAB
BASOPHILS # BLD: 0.05 K/UL (ref 0.01–0.08)
BASOPHILS NFR BLD: 0.7 % (ref 0.1–1.2)
EOSINOPHIL # BLD: 0.4 K/UL (ref 0.04–0.54)
EOSINOPHIL NFR BLD: 5.8 % (ref 0.7–7)
ERYTHROCYTE [DISTWIDTH] IN BLOOD BY AUTOMATED COUNT: 12.2 % (ref 11.6–14.4)
HCT VFR BLD AUTO: 37.9 % (ref 40.1–51)
HGB BLD-MCNC: 13.5 G/DL (ref 13.7–17.5)
LYMPHOCYTES # BLD: 0.92 K/UL (ref 1.18–3.74)
LYMPHOCYTES NFR BLD: 13.4 % (ref 19.3–53.1)
MCH RBC QN AUTO: 30.5 PG (ref 25.7–32.2)
MCHC RBC AUTO-ENTMCNC: 35.6 G/DL (ref 32.3–36.5)
MCV RBC AUTO: 85.7 FL (ref 79–92.2)
MONOCYTES # BLD: 0.64 K/UL (ref 0.24–0.82)
MONOCYTES NFR BLD: 9.3 % (ref 4.7–12.5)
NEUTROPHILS # BLD: 4.82 K/UL (ref 1.56–6.13)
NEUTS SEG NFR BLD: 70.5 % (ref 34–71.1)
PLATELET # BLD AUTO: 158 K/UL (ref 163–337)
PMV BLD AUTO: 10.1 FL (ref 7.4–10.4)
RBC # BLD AUTO: 4.42 M/UL (ref 4.63–6.08)
WBC # BLD AUTO: 6.85 K/UL (ref 4.23–9.07)

## 2023-11-22 PROCEDURE — 99212 OFFICE O/P EST SF 10 MIN: CPT

## 2023-11-22 PROCEDURE — G8427 DOCREV CUR MEDS BY ELIG CLIN: HCPCS | Performed by: INTERNAL MEDICINE

## 2023-11-22 PROCEDURE — 4004F PT TOBACCO SCREEN RCVD TLK: CPT | Performed by: INTERNAL MEDICINE

## 2023-11-22 PROCEDURE — 3017F COLORECTAL CA SCREEN DOC REV: CPT | Performed by: INTERNAL MEDICINE

## 2023-11-22 PROCEDURE — 6360000002 HC RX W HCPCS: Performed by: INTERNAL MEDICINE

## 2023-11-22 PROCEDURE — 99214 OFFICE O/P EST MOD 30 MIN: CPT | Performed by: INTERNAL MEDICINE

## 2023-11-22 PROCEDURE — 85025 COMPLETE CBC W/AUTO DIFF WBC: CPT

## 2023-11-22 PROCEDURE — G8484 FLU IMMUNIZE NO ADMIN: HCPCS | Performed by: INTERNAL MEDICINE

## 2023-11-22 PROCEDURE — G8417 CALC BMI ABV UP PARAM F/U: HCPCS | Performed by: INTERNAL MEDICINE

## 2023-11-22 PROCEDURE — 1123F ACP DISCUSS/DSCN MKR DOCD: CPT | Performed by: INTERNAL MEDICINE

## 2023-11-22 PROCEDURE — 36415 COLL VENOUS BLD VENIPUNCTURE: CPT

## 2023-11-22 PROCEDURE — 96523 IRRIG DRUG DELIVERY DEVICE: CPT

## 2023-11-22 PROCEDURE — 2580000003 HC RX 258: Performed by: INTERNAL MEDICINE

## 2023-11-22 RX ORDER — SODIUM CHLORIDE 0.9 % (FLUSH) 0.9 %
5-40 SYRINGE (ML) INJECTION PRN
Status: CANCELLED | OUTPATIENT
Start: 2023-11-22

## 2023-11-22 RX ORDER — HEPARIN SODIUM (PORCINE) LOCK FLUSH IV SOLN 100 UNIT/ML 100 UNIT/ML
500 SOLUTION INTRAVENOUS PRN
Status: CANCELLED | OUTPATIENT
Start: 2023-11-22

## 2023-11-22 RX ORDER — SODIUM CHLORIDE 0.9 % (FLUSH) 0.9 %
5-40 SYRINGE (ML) INJECTION PRN
Status: DISCONTINUED | OUTPATIENT
Start: 2023-11-22 | End: 2023-11-23 | Stop reason: HOSPADM

## 2023-11-22 RX ORDER — HEPARIN 100 UNIT/ML
500 SYRINGE INTRAVENOUS PRN
OUTPATIENT
Start: 2023-11-22

## 2023-11-22 RX ORDER — SODIUM CHLORIDE 0.9 % (FLUSH) 0.9 %
5-40 SYRINGE (ML) INJECTION PRN
OUTPATIENT
Start: 2023-11-22

## 2023-11-22 RX ORDER — HEPARIN 100 UNIT/ML
500 SYRINGE INTRAVENOUS PRN
Status: DISCONTINUED | OUTPATIENT
Start: 2023-11-22 | End: 2023-11-23 | Stop reason: HOSPADM

## 2023-11-22 RX ADMIN — SODIUM CHLORIDE, PRESERVATIVE FREE 20 ML: 5 INJECTION INTRAVENOUS at 09:18

## 2023-11-22 RX ADMIN — HEPARIN 500 UNITS: 100 SYRINGE at 09:18

## 2024-01-03 ENCOUNTER — TELEPHONE (OUTPATIENT)
Dept: HEMATOLOGY | Age: 69
End: 2024-01-03

## 2024-01-03 DIAGNOSIS — C65.1 MALIGNANT NEOPLASM OF RIGHT RENAL PELVIS (HCC): Primary | ICD-10-CM

## 2024-01-04 ENCOUNTER — HOSPITAL ENCOUNTER (OUTPATIENT)
Dept: INFUSION THERAPY | Age: 69
Discharge: HOME | End: 2024-01-04
Payer: MEDICARE

## 2024-01-04 DIAGNOSIS — C65.1 MALIGNANT NEOPLASM OF RIGHT RENAL PELVIS (HCC): ICD-10-CM

## 2024-01-04 DIAGNOSIS — Z45.2 ENCOUNTER FOR CENTRAL LINE CARE: ICD-10-CM

## 2024-01-04 DIAGNOSIS — I87.8 POOR VENOUS ACCESS: Primary | ICD-10-CM

## 2024-01-04 LAB
BASOPHILS # BLD: 0.06 K/UL (ref 0.01–0.08)
BASOPHILS NFR BLD: 0.7 % (ref 0.1–1.2)
EOSINOPHIL # BLD: 0.3 K/UL (ref 0.04–0.54)
EOSINOPHIL NFR BLD: 3.4 % (ref 0.7–7)
ERYTHROCYTE [DISTWIDTH] IN BLOOD BY AUTOMATED COUNT: 12.1 % (ref 11.6–14.4)
HCT VFR BLD AUTO: 38.4 % (ref 40.1–51)
HGB BLD-MCNC: 13.6 G/DL (ref 13.7–17.5)
LYMPHOCYTES # BLD: 0.85 K/UL (ref 1.18–3.74)
LYMPHOCYTES NFR BLD: 9.7 % (ref 19.3–53.1)
MCH RBC QN AUTO: 30.6 PG (ref 25.7–32.2)
MCHC RBC AUTO-ENTMCNC: 35.4 G/DL (ref 32.3–36.5)
MCV RBC AUTO: 86.3 FL (ref 79–92.2)
MONOCYTES # BLD: 0.71 K/UL (ref 0.24–0.82)
MONOCYTES NFR BLD: 8.1 % (ref 4.7–12.5)
NEUTROPHILS # BLD: 6.83 K/UL (ref 1.56–6.13)
NEUTS SEG NFR BLD: 77.9 % (ref 34–71.1)
PLATELET # BLD AUTO: 171 K/UL (ref 163–337)
PMV BLD AUTO: 9.9 FL (ref 7.4–10.4)
RBC # BLD AUTO: 4.45 M/UL (ref 4.63–6.08)
WBC # BLD AUTO: 8.77 K/UL (ref 4.23–9.07)

## 2024-01-04 PROCEDURE — 2580000003 HC RX 258: Performed by: INTERNAL MEDICINE

## 2024-01-04 PROCEDURE — 36415 COLL VENOUS BLD VENIPUNCTURE: CPT

## 2024-01-04 PROCEDURE — 85025 COMPLETE CBC W/AUTO DIFF WBC: CPT

## 2024-01-04 PROCEDURE — 6360000002 HC RX W HCPCS: Performed by: INTERNAL MEDICINE

## 2024-01-04 PROCEDURE — 96523 IRRIG DRUG DELIVERY DEVICE: CPT

## 2024-01-04 RX ORDER — SODIUM CHLORIDE 0.9 % (FLUSH) 0.9 %
5-40 SYRINGE (ML) INJECTION PRN
OUTPATIENT
Start: 2024-01-04

## 2024-01-04 RX ORDER — HEPARIN 100 UNIT/ML
500 SYRINGE INTRAVENOUS PRN
OUTPATIENT
Start: 2024-01-04

## 2024-01-04 RX ORDER — HEPARIN 100 UNIT/ML
500 SYRINGE INTRAVENOUS PRN
Status: DISCONTINUED | OUTPATIENT
Start: 2024-01-04 | End: 2024-01-05 | Stop reason: HOSPADM

## 2024-01-04 RX ORDER — SODIUM CHLORIDE 0.9 % (FLUSH) 0.9 %
5-40 SYRINGE (ML) INJECTION PRN
Status: DISCONTINUED | OUTPATIENT
Start: 2024-01-04 | End: 2024-01-05 | Stop reason: HOSPADM

## 2024-01-04 RX ADMIN — HEPARIN 500 UNITS: 100 SYRINGE at 09:33

## 2024-01-04 RX ADMIN — SODIUM CHLORIDE, PRESERVATIVE FREE 10 ML: 5 INJECTION INTRAVENOUS at 09:32

## 2024-02-29 ENCOUNTER — HOSPITAL ENCOUNTER (OUTPATIENT)
Dept: INFUSION THERAPY | Age: 69
Discharge: HOME | End: 2024-02-29
Payer: MEDICARE

## 2024-02-29 DIAGNOSIS — I87.8 POOR VENOUS ACCESS: Primary | ICD-10-CM

## 2024-02-29 DIAGNOSIS — Z45.2 ENCOUNTER FOR CENTRAL LINE CARE: ICD-10-CM

## 2024-02-29 PROCEDURE — 96523 IRRIG DRUG DELIVERY DEVICE: CPT

## 2024-02-29 PROCEDURE — 6360000002 HC RX W HCPCS: Performed by: INTERNAL MEDICINE

## 2024-02-29 PROCEDURE — 2580000003 HC RX 258: Performed by: INTERNAL MEDICINE

## 2024-02-29 RX ORDER — SODIUM CHLORIDE 0.9 % (FLUSH) 0.9 %
5-40 SYRINGE (ML) INJECTION PRN
OUTPATIENT
Start: 2024-02-29

## 2024-02-29 RX ORDER — HEPARIN 100 UNIT/ML
500 SYRINGE INTRAVENOUS PRN
OUTPATIENT
Start: 2024-02-29

## 2024-02-29 RX ORDER — HEPARIN 100 UNIT/ML
500 SYRINGE INTRAVENOUS PRN
Status: DISCONTINUED | OUTPATIENT
Start: 2024-02-29 | End: 2024-03-01 | Stop reason: HOSPADM

## 2024-02-29 RX ORDER — SODIUM CHLORIDE 0.9 % (FLUSH) 0.9 %
5-40 SYRINGE (ML) INJECTION PRN
Status: DISCONTINUED | OUTPATIENT
Start: 2024-02-29 | End: 2024-03-01 | Stop reason: HOSPADM

## 2024-02-29 RX ADMIN — SODIUM CHLORIDE, PRESERVATIVE FREE 10 ML: 5 INJECTION INTRAVENOUS at 08:52

## 2024-02-29 RX ADMIN — HEPARIN 500 UNITS: 100 SYRINGE at 08:53

## 2024-05-22 ENCOUNTER — HOSPITAL ENCOUNTER (OUTPATIENT)
Dept: CT IMAGING | Age: 69
Discharge: HOME | End: 2024-05-22
Attending: INTERNAL MEDICINE
Payer: MEDICARE

## 2024-05-22 ENCOUNTER — TELEPHONE (OUTPATIENT)
Dept: HEMATOLOGY | Age: 69
End: 2024-05-22

## 2024-05-22 DIAGNOSIS — C65.1 MALIGNANT NEOPLASM OF RIGHT RENAL PELVIS (HCC): ICD-10-CM

## 2024-05-22 LAB — CREAT SERPL-MCNC: 1.8 MG/DL (ref 0.3–1.3)

## 2024-05-22 PROCEDURE — 6360000004 HC RX CONTRAST MEDICATION: Performed by: INTERNAL MEDICINE

## 2024-05-22 PROCEDURE — 82565 ASSAY OF CREATININE: CPT

## 2024-05-22 PROCEDURE — 74177 CT ABD & PELVIS W/CONTRAST: CPT

## 2024-05-22 PROCEDURE — 71260 CT THORAX DX C+: CPT

## 2024-05-22 RX ADMIN — IOPAMIDOL 60 ML: 755 INJECTION, SOLUTION INTRAVENOUS at 08:53

## 2024-05-22 NOTE — TELEPHONE ENCOUNTER
I called and reminded of their appt on 05/29/24. Patient is aware to come at time of appt and not lab appt time. Patient confirmed appt date and time.

## 2024-05-28 DIAGNOSIS — C68.9 UROTHELIAL CANCER (HCC): Primary | ICD-10-CM

## 2024-05-29 ENCOUNTER — HOSPITAL ENCOUNTER (OUTPATIENT)
Dept: INFUSION THERAPY | Age: 69
Discharge: HOME | End: 2024-05-29
Payer: MEDICARE

## 2024-05-29 ENCOUNTER — OFFICE VISIT (OUTPATIENT)
Dept: HEMATOLOGY | Age: 69
End: 2024-05-29
Payer: MEDICARE

## 2024-05-29 VITALS
HEIGHT: 68 IN | TEMPERATURE: 98 F | HEART RATE: 74 BPM | SYSTOLIC BLOOD PRESSURE: 152 MMHG | OXYGEN SATURATION: 97 % | DIASTOLIC BLOOD PRESSURE: 80 MMHG | BODY MASS INDEX: 29.21 KG/M2 | WEIGHT: 192.7 LBS

## 2024-05-29 DIAGNOSIS — C65.1 MALIGNANT NEOPLASM OF RIGHT RENAL PELVIS (HCC): ICD-10-CM

## 2024-05-29 DIAGNOSIS — C68.9 UROTHELIAL CANCER (HCC): Primary | ICD-10-CM

## 2024-05-29 DIAGNOSIS — I10 HYPERTENSION, UNSPECIFIED TYPE: ICD-10-CM

## 2024-05-29 DIAGNOSIS — Z45.2 ENCOUNTER FOR CENTRAL LINE CARE: ICD-10-CM

## 2024-05-29 DIAGNOSIS — N28.9 RENAL IMPAIRMENT: ICD-10-CM

## 2024-05-29 DIAGNOSIS — C68.9 UROTHELIAL CANCER (HCC): ICD-10-CM

## 2024-05-29 DIAGNOSIS — I87.8 POOR VENOUS ACCESS: Primary | ICD-10-CM

## 2024-05-29 DIAGNOSIS — Z71.89 CARE PLAN DISCUSSED WITH PATIENT: ICD-10-CM

## 2024-05-29 DIAGNOSIS — D64.9 NORMOCYTIC ANEMIA: ICD-10-CM

## 2024-05-29 LAB
ALBUMIN SERPL-MCNC: 4.5 G/DL (ref 3.5–5.2)
ALP SERPL-CCNC: 87 U/L (ref 40–130)
ALT SERPL-CCNC: 15 U/L (ref 21–72)
ANION GAP SERPL CALCULATED.3IONS-SCNC: 10 MMOL/L (ref 7–19)
AST SERPL-CCNC: 39 U/L (ref 17–59)
BASOPHILS # BLD: 0.05 K/UL (ref 0.01–0.08)
BASOPHILS NFR BLD: 0.7 % (ref 0.1–1.2)
BILIRUB SERPL-MCNC: 0.7 MG/DL (ref 0.2–1.3)
BUN SERPL-MCNC: 19 MG/DL (ref 9–20)
CALCIUM SERPL-MCNC: 9.2 MG/DL (ref 8.4–10.2)
CHLORIDE SERPL-SCNC: 97 MMOL/L (ref 98–111)
CO2 SERPL-SCNC: 25 MMOL/L (ref 22–29)
CREAT SERPL-MCNC: 1.6 MG/DL (ref 0.6–1.2)
EOSINOPHIL # BLD: 0.43 K/UL (ref 0.04–0.54)
EOSINOPHIL NFR BLD: 6.2 % (ref 0.7–7)
ERYTHROCYTE [DISTWIDTH] IN BLOOD BY AUTOMATED COUNT: 12.3 % (ref 11.6–14.4)
GLOBULIN: 2.8 G/DL
GLUCOSE SERPL-MCNC: 154 MG/DL (ref 74–106)
HCT VFR BLD AUTO: 36.4 % (ref 40.1–51)
HGB BLD-MCNC: 13.1 G/DL (ref 13.7–17.5)
LYMPHOCYTES # BLD: 0.89 K/UL (ref 1.18–3.74)
LYMPHOCYTES NFR BLD: 12.8 % (ref 19.3–53.1)
MCH RBC QN AUTO: 31.5 PG (ref 25.7–32.2)
MCHC RBC AUTO-ENTMCNC: 36 G/DL (ref 32.3–36.5)
MCV RBC AUTO: 87.5 FL (ref 79–92.2)
MONOCYTES # BLD: 0.64 K/UL (ref 0.24–0.82)
MONOCYTES NFR BLD: 9.2 % (ref 4.7–12.5)
NEUTROPHILS # BLD: 4.94 K/UL (ref 1.56–6.13)
NEUTS SEG NFR BLD: 70.8 % (ref 34–71.1)
PLATELET # BLD AUTO: 168 K/UL (ref 163–337)
PMV BLD AUTO: 10.1 FL (ref 7.4–10.4)
POTASSIUM SERPL-SCNC: 4.2 MMOL/L (ref 3.5–5.1)
PROT SERPL-MCNC: 7.2 G/DL (ref 6.3–8.2)
RBC # BLD AUTO: 4.16 M/UL (ref 4.63–6.08)
SODIUM SERPL-SCNC: 132 MMOL/L (ref 137–145)
WBC # BLD AUTO: 6.97 K/UL (ref 4.23–9.07)

## 2024-05-29 PROCEDURE — G8427 DOCREV CUR MEDS BY ELIG CLIN: HCPCS | Performed by: INTERNAL MEDICINE

## 2024-05-29 PROCEDURE — 3079F DIAST BP 80-89 MM HG: CPT | Performed by: INTERNAL MEDICINE

## 2024-05-29 PROCEDURE — 3017F COLORECTAL CA SCREEN DOC REV: CPT | Performed by: INTERNAL MEDICINE

## 2024-05-29 PROCEDURE — 1036F TOBACCO NON-USER: CPT | Performed by: INTERNAL MEDICINE

## 2024-05-29 PROCEDURE — 6360000002 HC RX W HCPCS

## 2024-05-29 PROCEDURE — 36415 COLL VENOUS BLD VENIPUNCTURE: CPT

## 2024-05-29 PROCEDURE — G8417 CALC BMI ABV UP PARAM F/U: HCPCS | Performed by: INTERNAL MEDICINE

## 2024-05-29 PROCEDURE — 3077F SYST BP >= 140 MM HG: CPT | Performed by: INTERNAL MEDICINE

## 2024-05-29 PROCEDURE — 80053 COMPREHEN METABOLIC PANEL: CPT

## 2024-05-29 PROCEDURE — 99213 OFFICE O/P EST LOW 20 MIN: CPT | Performed by: INTERNAL MEDICINE

## 2024-05-29 PROCEDURE — G2211 COMPLEX E/M VISIT ADD ON: HCPCS | Performed by: INTERNAL MEDICINE

## 2024-05-29 PROCEDURE — 2580000003 HC RX 258

## 2024-05-29 PROCEDURE — 99212 OFFICE O/P EST SF 10 MIN: CPT

## 2024-05-29 PROCEDURE — 96523 IRRIG DRUG DELIVERY DEVICE: CPT

## 2024-05-29 PROCEDURE — 1123F ACP DISCUSS/DSCN MKR DOCD: CPT | Performed by: INTERNAL MEDICINE

## 2024-05-29 RX ORDER — SODIUM CHLORIDE 0.9 % (FLUSH) 0.9 %
5-40 SYRINGE (ML) INJECTION PRN
OUTPATIENT
Start: 2024-05-29

## 2024-05-29 RX ORDER — HEPARIN 100 UNIT/ML
500 SYRINGE INTRAVENOUS PRN
OUTPATIENT
Start: 2024-05-29

## 2024-05-29 RX ORDER — SODIUM CHLORIDE 0.9 % (FLUSH) 0.9 %
5-40 SYRINGE (ML) INJECTION PRN
Status: DISCONTINUED | OUTPATIENT
Start: 2024-05-29 | End: 2024-05-30 | Stop reason: HOSPADM

## 2024-05-29 RX ORDER — HEPARIN 100 UNIT/ML
500 SYRINGE INTRAVENOUS PRN
Status: DISCONTINUED | OUTPATIENT
Start: 2024-05-29 | End: 2024-05-30 | Stop reason: HOSPADM

## 2024-05-29 RX ADMIN — HEPARIN 500 UNITS: 100 SYRINGE at 10:00

## 2024-05-29 RX ADMIN — SODIUM CHLORIDE, PRESERVATIVE FREE 10 ML: 5 INJECTION INTRAVENOUS at 10:00

## 2024-08-29 ENCOUNTER — HOSPITAL ENCOUNTER (OUTPATIENT)
Dept: INFUSION THERAPY | Age: 69
Discharge: HOME | End: 2024-08-29
Payer: MEDICARE

## 2024-08-29 DIAGNOSIS — I87.8 POOR VENOUS ACCESS: Primary | ICD-10-CM

## 2024-08-29 DIAGNOSIS — Z45.2 ENCOUNTER FOR CENTRAL LINE CARE: ICD-10-CM

## 2024-08-29 PROCEDURE — 6360000002 HC RX W HCPCS

## 2024-08-29 PROCEDURE — 2580000003 HC RX 258

## 2024-08-29 PROCEDURE — 96523 IRRIG DRUG DELIVERY DEVICE: CPT

## 2024-08-29 RX ORDER — HEPARIN 100 UNIT/ML
500 SYRINGE INTRAVENOUS PRN
OUTPATIENT
Start: 2024-08-29

## 2024-08-29 RX ORDER — HEPARIN 100 UNIT/ML
500 SYRINGE INTRAVENOUS PRN
Status: DISCONTINUED | OUTPATIENT
Start: 2024-08-29 | End: 2024-08-30 | Stop reason: HOSPADM

## 2024-08-29 RX ORDER — SODIUM CHLORIDE 0.9 % (FLUSH) 0.9 %
5-40 SYRINGE (ML) INJECTION PRN
Status: DISCONTINUED | OUTPATIENT
Start: 2024-08-29 | End: 2024-08-30 | Stop reason: HOSPADM

## 2024-08-29 RX ORDER — SODIUM CHLORIDE 0.9 % (FLUSH) 0.9 %
5-40 SYRINGE (ML) INJECTION PRN
OUTPATIENT
Start: 2024-08-29

## 2024-08-29 RX ADMIN — HEPARIN 500 UNITS: 100 SYRINGE at 08:07

## 2024-08-29 RX ADMIN — SODIUM CHLORIDE, PRESERVATIVE FREE 10 ML: 5 INJECTION INTRAVENOUS at 08:07

## 2024-12-02 ENCOUNTER — HOSPITAL ENCOUNTER (OUTPATIENT)
Dept: CT IMAGING | Age: 69
Discharge: HOME | End: 2024-12-02
Payer: MEDICARE

## 2024-12-02 DIAGNOSIS — C68.9 UROTHELIAL CANCER (HCC): ICD-10-CM

## 2024-12-02 LAB — CREAT SERPL-MCNC: 1.7 MG/DL (ref 0.3–1.3)

## 2024-12-02 PROCEDURE — 74177 CT ABD & PELVIS W/CONTRAST: CPT

## 2024-12-02 PROCEDURE — 71260 CT THORAX DX C+: CPT

## 2024-12-02 PROCEDURE — 6360000004 HC RX CONTRAST MEDICATION: Performed by: INTERNAL MEDICINE

## 2024-12-02 PROCEDURE — 82565 ASSAY OF CREATININE: CPT

## 2024-12-02 RX ORDER — IOPAMIDOL 755 MG/ML
50 INJECTION, SOLUTION INTRAVASCULAR
Status: COMPLETED | OUTPATIENT
Start: 2024-12-02 | End: 2024-12-02

## 2024-12-02 RX ADMIN — IOPAMIDOL 50 ML: 755 INJECTION, SOLUTION INTRAVENOUS at 08:33

## 2024-12-04 ENCOUNTER — TELEPHONE (OUTPATIENT)
Dept: HEMATOLOGY | Age: 69
End: 2024-12-04

## 2024-12-04 NOTE — TELEPHONE ENCOUNTER
Called Patient and reminded patient of their appointment on 12/06/2024 and patient phone was not taking calls at this time. Will try other numbers in chart.  Called patient to remind of appointment on 12/06/2024 and was unable to leave a message or talk to patient due the phone kept ringing and no one ever answered or no option to leave voicemail.  Called patient to remind of appointment on 12/06/2024 and was unable to leave a message or talk to patient due the phone kept ringing and no one ever answered or no option to leave voicemail.

## 2024-12-05 DIAGNOSIS — C65.1 MALIGNANT NEOPLASM OF RIGHT RENAL PELVIS (HCC): ICD-10-CM

## 2024-12-05 DIAGNOSIS — C68.9 UROTHELIAL CANCER (HCC): Primary | ICD-10-CM

## 2024-12-05 NOTE — PROGRESS NOTES
MEDICAL ONCOLOGY PROGRESS NOTE                                                             Rickey Sandhu   1955 12/6/2024     Chief Complaint   Patient presents with    Follow-up     Urothelial cancer (HCC)     INTERVAL HISTORY/HISTORY OF PRESENT ILLNESS:  Reason for MD visit-surveillance follow-up high-grade urothelial carcinoma  The patient is a very pleasant 69 years old male who has a diagnosis of recurrent high-grade urothelial carcinoma.  He is a status post treatment with carboplatin, Gemzar and Keytruda followed by maintenance Keytruda.  His last Keytruda treatment was delivered on 3/5/2021.   Patient present for routine surveillance follow up visit. Recent surveillance scans discussed with patient and wife. He denies any new complaints. His weight has been stable. Denies any abdominal pain.  Denies any hematuria.    Diagnosis   High-grade urothelial carcinoma of the left kidney stage IV, 2010.   Recurrence July 2013   Second recurrence October 2015    Treatment summary  1/5/2010-right radical nephrectomy   Relapse February 2013-retroperitoneal soft tissue mass.   Gemcitabine/cisplatin completed July 2013 with good partial response, followed by RT 5040 cGy retroperitoneal residual disease, completed October 2013.    October 2015- chest wall recurrence, treated with RT   May 2020-carboplatin/Gemzar x4 cycles and Keytruda followed by Keytruda maintenance.  9/18/20-03/05/21 Maintenance Keytruda  5/7/21- Keytruda on hold due to blister rash secondary to Keytruda    Cancer history-High-grade Urothelial carcinoma left renal pelvis  Mr Sandhu was seen in initial oncology consultation on 2/2/2017 as a referral by Dr. Fernandes's office to establish continuity of care of his history of urothelial carcinoma.  7/1/20098902-revuio-rwsvshszy right renal cyst measuring 2.9 x 3.4 x 2.8 cm.   12/4/20090339-N[place]-pvwqpc biopsy of a right kidney mass was consistent with poorly differentiated adenocarcinoma.   2/24/2010-the

## 2024-12-06 ENCOUNTER — OFFICE VISIT (OUTPATIENT)
Dept: HEMATOLOGY | Age: 69
End: 2024-12-06
Payer: MEDICARE

## 2024-12-06 ENCOUNTER — HOSPITAL ENCOUNTER (OUTPATIENT)
Dept: INFUSION THERAPY | Age: 69
Discharge: HOME | End: 2024-12-06
Payer: MEDICARE

## 2024-12-06 VITALS
HEIGHT: 67 IN | HEART RATE: 72 BPM | SYSTOLIC BLOOD PRESSURE: 148 MMHG | TEMPERATURE: 98.6 F | BODY MASS INDEX: 31.03 KG/M2 | DIASTOLIC BLOOD PRESSURE: 72 MMHG | WEIGHT: 197.7 LBS | OXYGEN SATURATION: 98 %

## 2024-12-06 DIAGNOSIS — N28.9 RENAL IMPAIRMENT: ICD-10-CM

## 2024-12-06 DIAGNOSIS — L27.0 DRUG-INDUCED SKIN RASH: ICD-10-CM

## 2024-12-06 DIAGNOSIS — C68.9 UROTHELIAL CANCER (HCC): ICD-10-CM

## 2024-12-06 DIAGNOSIS — C65.1 MALIGNANT NEOPLASM OF RIGHT RENAL PELVIS (HCC): ICD-10-CM

## 2024-12-06 DIAGNOSIS — E87.1 HYPONATREMIA: ICD-10-CM

## 2024-12-06 DIAGNOSIS — Z71.89 CARE PLAN DISCUSSED WITH PATIENT: ICD-10-CM

## 2024-12-06 DIAGNOSIS — R53.82 CHRONIC FATIGUE: ICD-10-CM

## 2024-12-06 DIAGNOSIS — C68.9 UROTHELIAL CANCER (HCC): Primary | ICD-10-CM

## 2024-12-06 DIAGNOSIS — Z45.2 ENCOUNTER FOR CENTRAL LINE CARE: ICD-10-CM

## 2024-12-06 DIAGNOSIS — I87.8 POOR VENOUS ACCESS: Primary | ICD-10-CM

## 2024-12-06 LAB
ALBUMIN SERPL-MCNC: 4.2 G/DL (ref 3.5–5.2)
ALP SERPL-CCNC: 91 U/L (ref 40–129)
ALT SERPL-CCNC: 17 U/L (ref 5–41)
ANION GAP SERPL CALCULATED.3IONS-SCNC: 11 MMOL/L (ref 7–19)
AST SERPL-CCNC: 27 U/L (ref 5–40)
BASOPHILS # BLD: 0.02 K/UL (ref 0.01–0.08)
BASOPHILS NFR BLD: 0.2 % (ref 0.1–1.2)
BILIRUB SERPL-MCNC: 0.6 MG/DL (ref 0–1.2)
BUN SERPL-MCNC: 17 MG/DL (ref 8–23)
CALCIUM SERPL-MCNC: 8.7 MG/DL (ref 8.8–10.2)
CHLORIDE SERPL-SCNC: 94 MMOL/L (ref 98–107)
CO2 SERPL-SCNC: 23 MMOL/L (ref 22–29)
CORTIS AM PEAK SERPL-MCNC: 14.9 UG/DL (ref 4.8–19.5)
CREAT SERPL-MCNC: 1.3 MG/DL (ref 0.7–1.2)
EOSINOPHIL # BLD: 0.06 K/UL (ref 0.04–0.54)
EOSINOPHIL NFR BLD: 0.6 % (ref 0.7–7)
ERYTHROCYTE [DISTWIDTH] IN BLOOD BY AUTOMATED COUNT: 12.2 % (ref 11.6–14.4)
GLUCOSE SERPL-MCNC: 246 MG/DL (ref 70–99)
HCT VFR BLD AUTO: 37.9 % (ref 40.1–51)
HGB BLD-MCNC: 13.8 G/DL (ref 13.7–17.5)
LYMPHOCYTES # BLD: 0.61 K/UL (ref 1.18–3.74)
LYMPHOCYTES NFR BLD: 5.7 % (ref 19.3–53.1)
MCH RBC QN AUTO: 31.2 PG (ref 25.7–32.2)
MCHC RBC AUTO-ENTMCNC: 36.4 G/DL (ref 32.3–36.5)
MCV RBC AUTO: 85.7 FL (ref 79–92.2)
MONOCYTES # BLD: 0.92 K/UL (ref 0.24–0.82)
MONOCYTES NFR BLD: 8.6 % (ref 4.7–12.5)
NEUTROPHILS # BLD: 9.06 K/UL (ref 1.56–6.13)
NEUTS SEG NFR BLD: 84.7 % (ref 34–71.1)
PLATELET # BLD AUTO: 146 K/UL (ref 163–337)
PMV BLD AUTO: 10.6 FL (ref 7.4–10.4)
POTASSIUM SERPL-SCNC: 4.5 MMOL/L (ref 3.5–5.1)
PROT SERPL-MCNC: 7.4 G/DL (ref 6.4–8.3)
RBC # BLD AUTO: 4.42 M/UL (ref 4.63–6.08)
SODIUM SERPL-SCNC: 128 MMOL/L (ref 136–145)
T4 FREE SERPL-MCNC: 1.54 NG/DL (ref 0.93–1.7)
TSH SERPL DL<=0.005 MIU/L-ACNC: 4.22 UIU/ML (ref 0.27–4.2)
WBC # BLD AUTO: 10.69 K/UL (ref 4.23–9.07)

## 2024-12-06 PROCEDURE — 2580000003 HC RX 258

## 2024-12-06 PROCEDURE — 6360000002 HC RX W HCPCS

## 2024-12-06 PROCEDURE — 80053 COMPREHEN METABOLIC PANEL: CPT

## 2024-12-06 PROCEDURE — 85025 COMPLETE CBC W/AUTO DIFF WBC: CPT

## 2024-12-06 PROCEDURE — 36415 COLL VENOUS BLD VENIPUNCTURE: CPT

## 2024-12-06 PROCEDURE — 99213 OFFICE O/P EST LOW 20 MIN: CPT

## 2024-12-06 PROCEDURE — 96523 IRRIG DRUG DELIVERY DEVICE: CPT

## 2024-12-06 RX ORDER — SODIUM CHLORIDE 0.9 % (FLUSH) 0.9 %
5-40 SYRINGE (ML) INJECTION PRN
Status: DISCONTINUED | OUTPATIENT
Start: 2024-12-06 | End: 2024-12-07 | Stop reason: HOSPADM

## 2024-12-06 RX ORDER — HEPARIN 100 UNIT/ML
500 SYRINGE INTRAVENOUS PRN
OUTPATIENT
Start: 2024-12-06

## 2024-12-06 RX ORDER — SODIUM CHLORIDE 0.9 % (FLUSH) 0.9 %
5-40 SYRINGE (ML) INJECTION PRN
OUTPATIENT
Start: 2024-12-06

## 2024-12-06 RX ORDER — HEPARIN 100 UNIT/ML
500 SYRINGE INTRAVENOUS PRN
Status: DISCONTINUED | OUTPATIENT
Start: 2024-12-06 | End: 2024-12-07 | Stop reason: HOSPADM

## 2024-12-06 RX ADMIN — SODIUM CHLORIDE, PRESERVATIVE FREE 10 ML: 5 INJECTION INTRAVENOUS at 09:46

## 2024-12-06 RX ADMIN — HEPARIN 500 UNITS: 100 SYRINGE at 09:46

## 2025-03-06 ENCOUNTER — HOSPITAL ENCOUNTER (OUTPATIENT)
Dept: INFUSION THERAPY | Age: 70
Discharge: HOME | End: 2025-03-06
Payer: MEDICARE

## 2025-03-06 DIAGNOSIS — I87.8 POOR VENOUS ACCESS: Primary | ICD-10-CM

## 2025-03-06 DIAGNOSIS — Z45.2 ENCOUNTER FOR CENTRAL LINE CARE: ICD-10-CM

## 2025-03-06 PROCEDURE — 6360000002 HC RX W HCPCS

## 2025-03-06 PROCEDURE — 2500000003 HC RX 250 WO HCPCS

## 2025-03-06 PROCEDURE — 96523 IRRIG DRUG DELIVERY DEVICE: CPT

## 2025-03-06 RX ORDER — SODIUM CHLORIDE 0.9 % (FLUSH) 0.9 %
5-40 SYRINGE (ML) INJECTION PRN
OUTPATIENT
Start: 2025-03-06

## 2025-03-06 RX ORDER — HEPARIN 100 UNIT/ML
500 SYRINGE INTRAVENOUS PRN
Status: DISCONTINUED | OUTPATIENT
Start: 2025-03-06 | End: 2025-03-07 | Stop reason: HOSPADM

## 2025-03-06 RX ORDER — SODIUM CHLORIDE 0.9 % (FLUSH) 0.9 %
5-40 SYRINGE (ML) INJECTION PRN
Status: DISCONTINUED | OUTPATIENT
Start: 2025-03-06 | End: 2025-03-07 | Stop reason: HOSPADM

## 2025-03-06 RX ORDER — HEPARIN 100 UNIT/ML
500 SYRINGE INTRAVENOUS PRN
OUTPATIENT
Start: 2025-03-06

## 2025-03-06 RX ADMIN — HEPARIN 500 UNITS: 100 SYRINGE at 09:40

## 2025-03-06 RX ADMIN — SODIUM CHLORIDE, PRESERVATIVE FREE 10 ML: 5 INJECTION INTRAVENOUS at 09:40

## 2025-03-21 RX ORDER — LATANOPROST 50 UG/ML
SOLUTION/ DROPS OPHTHALMIC
COMMUNITY
Start: 2025-02-14

## 2025-03-21 RX ORDER — ONDANSETRON 4 MG/1
4 TABLET, ORALLY DISINTEGRATING ORAL EVERY 8 HOURS PRN
COMMUNITY
End: 2025-03-24

## 2025-03-23 NOTE — PROGRESS NOTES
Subjective:     Patient ID: Rickey Sandhu is a 69 y.o. male  PCP: Hemanth Gonzalez MD  Referring Provider: No ref. provider found    HPI  Patient presents to the office today with the following complaints: New Patient      Patient seen in the office today. He has already scheduled his routine colonoscopy for 5/12/2025. He wanted this appointment today because he has questions prior to the procedure; mainly about his prep. He has a history of having right kidney removed due to cancer and had radiation and chemo. He is currently not having any problems. Denies diarrhea, bleeding, or constipation.     Last EGD -4/2020- UPPER GI ENDOSCOPY; W/ TRANSENDOSCOPIC ULTRASOUND, INTRA/TRANSMURAL FINE NEEDLE ASPIR/BX     Last Colonoscopy -2010- Dr Dias     Family hx colon cancer: niece and cousin  Family hx of colon polyps      Assessment:     1. Colon cancer screening    2. History of kidney cancer    3. Family hx of colon cancer    4. Family history of colonic polyps              Plan:   Lab work today  Schedule Colonoscopy on 5/12/2025  Instruct on bowel prep.   Nothing to eat or drink after midnight the day of the exam.  Unable to drive for 24 hours after the procedure.   No aspirin or nonsteroidal anti-inflammatories for 5 days before procedure.  I have discussed the benefits, alternatives, and risks (including bleeding, perforation and death)  for pursuing Endoscopy (EGD/Colonscopy/EUS/ERCP) with the patient and they are willing to continue. We also discussed the need for anesthesia, IV access, proper dietary changes, medication changes if necessary, and need for bowel prep (if ordered) prior to their Endoscopic procedure.  They are aware they must have someone accompany them to their scheduled procedure to drive them home - they agree to the above and are willing to continue.    Orders  No orders of the defined types were placed in this encounter.    Medications  No orders of the defined types were placed in this

## 2025-03-24 ENCOUNTER — OFFICE VISIT (OUTPATIENT)
Dept: GASTROENTEROLOGY | Age: 70
End: 2025-03-24
Payer: MEDICARE

## 2025-03-24 ENCOUNTER — RESULTS FOLLOW-UP (OUTPATIENT)
Dept: GASTROENTEROLOGY | Age: 70
End: 2025-03-24

## 2025-03-24 VITALS
HEIGHT: 68 IN | DIASTOLIC BLOOD PRESSURE: 80 MMHG | OXYGEN SATURATION: 98 % | HEART RATE: 78 BPM | BODY MASS INDEX: 30.16 KG/M2 | SYSTOLIC BLOOD PRESSURE: 150 MMHG | WEIGHT: 199 LBS

## 2025-03-24 DIAGNOSIS — Z12.11 COLON CANCER SCREENING: ICD-10-CM

## 2025-03-24 DIAGNOSIS — Z85.528 HISTORY OF KIDNEY CANCER: ICD-10-CM

## 2025-03-24 DIAGNOSIS — Z83.719 FAMILY HISTORY OF COLONIC POLYPS: ICD-10-CM

## 2025-03-24 DIAGNOSIS — Z80.0 FAMILY HX OF COLON CANCER: ICD-10-CM

## 2025-03-24 DIAGNOSIS — Z12.11 COLON CANCER SCREENING: Primary | ICD-10-CM

## 2025-03-24 LAB
ALBUMIN SERPL-MCNC: 4.5 G/DL (ref 3.5–5.2)
ALP SERPL-CCNC: 91 U/L (ref 40–129)
ALT SERPL-CCNC: 19 U/L (ref 10–50)
ANION GAP SERPL CALCULATED.3IONS-SCNC: 10 MMOL/L (ref 8–16)
AST SERPL-CCNC: 28 U/L (ref 10–50)
BASOPHILS # BLD: 0.1 K/UL (ref 0–0.2)
BASOPHILS NFR BLD: 0.8 % (ref 0–1)
BILIRUB SERPL-MCNC: 0.3 MG/DL (ref 0.2–1.2)
BUN SERPL-MCNC: 18 MG/DL (ref 8–23)
CALCIUM SERPL-MCNC: 9.9 MG/DL (ref 8.8–10.2)
CHLORIDE SERPL-SCNC: 97 MMOL/L (ref 98–107)
CO2 SERPL-SCNC: 26 MMOL/L (ref 22–29)
CREAT SERPL-MCNC: 1.5 MG/DL (ref 0.7–1.2)
EOSINOPHIL # BLD: 0.5 K/UL (ref 0–0.6)
EOSINOPHIL NFR BLD: 6.5 % (ref 0–5)
ERYTHROCYTE [DISTWIDTH] IN BLOOD BY AUTOMATED COUNT: 12.4 % (ref 11.5–14.5)
GLUCOSE SERPL-MCNC: 197 MG/DL (ref 70–99)
HCT VFR BLD AUTO: 42 % (ref 42–52)
HGB BLD-MCNC: 14.5 G/DL (ref 14–18)
IMM GRANULOCYTES # BLD: 0 K/UL
LYMPHOCYTES # BLD: 1 K/UL (ref 1.1–4.5)
LYMPHOCYTES NFR BLD: 14.1 % (ref 20–40)
MCH RBC QN AUTO: 30.8 PG (ref 27–31)
MCHC RBC AUTO-ENTMCNC: 34.5 G/DL (ref 33–37)
MCV RBC AUTO: 89.2 FL (ref 80–94)
MONOCYTES # BLD: 0.7 K/UL (ref 0–0.9)
MONOCYTES NFR BLD: 9.9 % (ref 0–10)
NEUTROPHILS # BLD: 4.9 K/UL (ref 1.5–7.5)
NEUTS SEG NFR BLD: 68.4 % (ref 50–65)
PLATELET # BLD AUTO: 169 K/UL (ref 130–400)
PMV BLD AUTO: 10.9 FL (ref 9.4–12.4)
POTASSIUM SERPL-SCNC: 4.6 MMOL/L (ref 3.5–5.1)
PROT SERPL-MCNC: 7.8 G/DL (ref 6.4–8.3)
RBC # BLD AUTO: 4.71 M/UL (ref 4.7–6.1)
SODIUM SERPL-SCNC: 133 MMOL/L (ref 136–145)
WBC # BLD AUTO: 7.1 K/UL (ref 4.8–10.8)

## 2025-03-24 PROCEDURE — 1036F TOBACCO NON-USER: CPT

## 2025-03-24 PROCEDURE — G8427 DOCREV CUR MEDS BY ELIG CLIN: HCPCS

## 2025-03-24 PROCEDURE — G8417 CALC BMI ABV UP PARAM F/U: HCPCS

## 2025-03-24 PROCEDURE — 99203 OFFICE O/P NEW LOW 30 MIN: CPT

## 2025-03-24 PROCEDURE — 1123F ACP DISCUSS/DSCN MKR DOCD: CPT

## 2025-03-24 PROCEDURE — 3017F COLORECTAL CA SCREEN DOC REV: CPT

## 2025-03-24 PROCEDURE — 1160F RVW MEDS BY RX/DR IN RCRD: CPT

## 2025-03-24 PROCEDURE — 1159F MED LIST DOCD IN RCRD: CPT

## 2025-03-24 RX ORDER — HYDROCORTISONE 25 MG/G
CREAM TOPICAL PRN
COMMUNITY

## 2025-03-24 RX ORDER — INSULIN LISPRO 100 [IU]/ML
INJECTION, SOLUTION INTRAVENOUS; SUBCUTANEOUS
COMMUNITY
Start: 2025-02-25

## 2025-03-24 RX ORDER — TRIAMCINOLONE ACETONIDE 1 MG/G
CREAM TOPICAL PRN
COMMUNITY

## 2025-03-24 ASSESSMENT — ENCOUNTER SYMPTOMS
ABDOMINAL DISTENTION: 0
ALLERGIC/IMMUNOLOGIC NEGATIVE: 1
CHOKING: 0
ANAL BLEEDING: 0
COUGH: 0
RESPIRATORY NEGATIVE: 1
BLOOD IN STOOL: 0
GASTROINTESTINAL NEGATIVE: 1
DIARRHEA: 0
ABDOMINAL PAIN: 0
CONSTIPATION: 0
TROUBLE SWALLOWING: 0
EYES NEGATIVE: 1
SHORTNESS OF BREATH: 0

## 2025-03-24 NOTE — RESULT ENCOUNTER NOTE
Labs are much better than they were 3 months ago except the kidney function is down a little more but stable

## 2025-05-09 ENCOUNTER — ANESTHESIA EVENT (OUTPATIENT)
Dept: OPERATING ROOM | Age: 70
End: 2025-05-09

## 2025-05-12 ENCOUNTER — HOSPITAL ENCOUNTER (OUTPATIENT)
Age: 70
Setting detail: SPECIMEN
Discharge: HOME | End: 2025-05-12
Payer: MEDICARE

## 2025-05-12 ENCOUNTER — HOSPITAL ENCOUNTER (OUTPATIENT)
Age: 70
Setting detail: OUTPATIENT SURGERY
Discharge: HOME | End: 2025-05-12
Attending: INTERNAL MEDICINE | Admitting: INTERNAL MEDICINE

## 2025-05-12 ENCOUNTER — ANESTHESIA (OUTPATIENT)
Dept: OPERATING ROOM | Age: 70
End: 2025-05-12

## 2025-05-12 ENCOUNTER — APPOINTMENT (OUTPATIENT)
Dept: OPERATING ROOM | Age: 70
End: 2025-05-12
Attending: INTERNAL MEDICINE

## 2025-05-12 VITALS
SYSTOLIC BLOOD PRESSURE: 121 MMHG | HEART RATE: 59 BPM | TEMPERATURE: 97.4 F | OXYGEN SATURATION: 97 % | WEIGHT: 194 LBS | RESPIRATION RATE: 16 BRPM | DIASTOLIC BLOOD PRESSURE: 64 MMHG | HEIGHT: 68 IN | BODY MASS INDEX: 29.4 KG/M2

## 2025-05-12 PROCEDURE — 88305 TISSUE EXAM BY PATHOLOGIST: CPT

## 2025-05-12 PROCEDURE — 45380 COLONOSCOPY AND BIOPSY: CPT

## 2025-05-12 PROCEDURE — 88305 TISSUE EXAM BY PATHOLOGIST: CPT | Performed by: PATHOLOGY

## 2025-05-12 RX ORDER — SODIUM CHLORIDE, SODIUM LACTATE, POTASSIUM CHLORIDE, CALCIUM CHLORIDE 600; 310; 30; 20 MG/100ML; MG/100ML; MG/100ML; MG/100ML
INJECTION, SOLUTION INTRAVENOUS CONTINUOUS
Status: DISCONTINUED | OUTPATIENT
Start: 2025-05-12 | End: 2025-05-12 | Stop reason: HOSPADM

## 2025-05-12 RX ORDER — PROPOFOL 10 MG/ML
INJECTION, EMULSION INTRAVENOUS
Status: DISCONTINUED | OUTPATIENT
Start: 2025-05-12 | End: 2025-05-12 | Stop reason: SDUPTHER

## 2025-05-12 RX ORDER — LIDOCAINE HYDROCHLORIDE 10 MG/ML
INJECTION, SOLUTION EPIDURAL; INFILTRATION; INTRACAUDAL; PERINEURAL
Status: DISCONTINUED | OUTPATIENT
Start: 2025-05-12 | End: 2025-05-12 | Stop reason: SDUPTHER

## 2025-05-12 RX ADMIN — PROPOFOL 200 MG: 10 INJECTION, EMULSION INTRAVENOUS at 07:48

## 2025-05-12 RX ADMIN — SODIUM CHLORIDE, SODIUM LACTATE, POTASSIUM CHLORIDE, CALCIUM CHLORIDE: 600; 310; 30; 20 INJECTION, SOLUTION INTRAVENOUS at 06:49

## 2025-05-12 RX ADMIN — LIDOCAINE HYDROCHLORIDE 50 MG: 10 INJECTION, SOLUTION EPIDURAL; INFILTRATION; INTRACAUDAL; PERINEURAL at 07:48

## 2025-05-12 ASSESSMENT — PAIN - FUNCTIONAL ASSESSMENT: PAIN_FUNCTIONAL_ASSESSMENT: NONE - DENIES PAIN

## 2025-05-12 NOTE — ANESTHESIA PRE PROCEDURE
Department of Anesthesiology  Preprocedure Note       Name:  Rickey Sandhu   Age:  70 y.o.  :  1955                                          MRN:  [id number]         Date:  2025      Surgeon: Surgeon(s):  Viral Fitzpatrick MD    Procedure: Procedure(s):  COLORECTAL CANCER SCREENING, NOT HIGH RISK    Medications prior to admission:   Prior to Admission medications    Medication Sig Start Date End Date Taking? Authorizing Provider   HUMALOG 100 UNIT/ML SOLN injection vial INJECT UP TO 75 UNITS INTO THE SKIN VIA INSULIN PUMP DAILY 25  Yes Miriam Anne MD   levothyroxine (SYNTHROID) 150 MCG tablet Take 1 tablet by mouth Daily 3/15/23  Yes Miriam Anne MD   Multiple Vitamins-Minerals (PRESERVISION AREDS 2 PO) Take 2 mg by mouth in the morning and at bedtime   Yes Miriam Anne MD   simvastatin (ZOCOR) 20 MG tablet Take 1 tablet by mouth nightly 10/11/21  Yes Miriam Anne MD   amLODIPine (NORVASC) 5 MG tablet Take 1 tablet by mouth in the morning and 1 tablet in the evening. 20  Yes Marisol Rodriguez MD   triamcinolone (KENALOG) 0.1 % cream Apply topically as needed    Miriam Anne MD   hydrocortisone 2.5 % cream Apply topically as needed    Miriam Anne MD   latanoprost (XALATAN) 0.005 % ophthalmic solution  25   Miriam Anne MD   hydrOXYzine HCl (ATARAX) 25 MG tablet TAKE ONE (1) TABLET BY MOUTH EVERY 6 HOURS AS NEEDED FOR ITCHING 10/31/23   Marisol Rodriguez MD   Insulin Lispro (HUMALOG SC) Inject 75 Units into the skin pump    ProviderMiriam MD       Current medications:    Current Facility-Administered Medications   Medication Dose Route Frequency Provider Last Rate Last Admin   • lactated ringers infusion   IntraVENous Continuous Viral Fitzpatrick MD           Allergies:  No Known Allergies    Problem List:    Patient Active Problem List   Diagnosis Code   • Diabetes mellitus (HCC) E11.9   • Retinopathy H35.00   •

## 2025-05-12 NOTE — OP NOTE
Patient: Rickey Sandhu : 1955  Med Rec#: [id number] Acc#: [id number]   Primary Care Provider Hemanth Gonzalez MD    Date of Procedure:  2025    Endoscopist: Viral Fitzpatrick MD    Referring Provider: Hemanth Gonzalez MD    Operation Performed: Colonoscopy with forceps polypectomy    Indications: Screening- family hx of colon cancer/polyps    Anesthesia:  Sedation was administered by anesthesia who monitored the patient during the procedure.    I met with Rickey Sandhu prior to procedure. We discussed the procedure itself, and I have discussed the risks of endoscopy (including-- but not limited to-- pain, discomfort, bleeding potentially requiring second endoscopic procedure and/or blood transfusion, organ perforation requiring operative repair, damage to organs near the colon, infection, aspiration, cardiopulmonary/allergic reaction), benefits, indications to endoscopy. Additionally, we discussed options other than colonoscopy. The patient expressed understanding. All questions answered. The patient decided to proceed with the procedure.  Signed informed consent was placed on the chart.    Blood Loss: minimal    Withdrawal time: > 6 min  Bowel Prep: Fair    Complications: no immediate complications    DESCRIPTION OF PROCEDURE:     A time out was performed. After written informed consent was obtained, the patient was placed in the left lateral position.     The perianal area was inspected, and a digital rectal exam was performed. A rectal exam was performed: normal tone, no palpable lesions. At this point, a forward viewing Olympus colonoscope was inserted into the anus and carefully advanced to the cecum.  The cecum was identified by the ileocecal valve and the appendiceal orifice. The colonoscope was then slowly withdrawn with careful inspection of the mucosa in a linear and circumferential fashion. The scope was retroflexed in the rectum. Suction was utilized during the procedure to remove as

## 2025-05-12 NOTE — DISCHARGE INSTRUCTIONS
Recommendations:  1. Repeat colonoscopy: pending pathology - 3 years, due to quality of prep and polyp removed.  2. Await biopsy results.      POST-OP ORDERS: ENDOSCOPY & COLONOSCOPY:    1. Rest today.    2. DO NOT eat or drink until wide awake; eat your usual diet today in moderate amount only.    3. DO NOT drive today.    4. Call physician if you have severe pain, vomiting, fever, rectal bleeding or black bowel movements.    5.  If a biopsy was taken or a polyp removed, you should expect to hear results in about 21 days.  If you have heard nothing from your physician by then, call the office for results.    6.  Discharge home when patient awake, vitals signs stable and tolerating liquids.

## 2025-05-12 NOTE — H&P
Patient Name: Rickey Sandhu  : 1955  MRN: [id number]  DATE: 25    Allergies: No Known Allergies     ENDOSCOPY  History and Physical    Procedure:    [] Diagnostic Colonoscopy       [x] Screening Colonoscopy  [] EGD      [] ERCP      [] EUS       [] Other    [x] Previous office notes/History and Physical reviewed from the patients chart. Please see EMR for further details of HPI. I have examined the patient's status immediately prior to the procedure and:      Indications/HPI:       [x] Screening              [] History of Polyps      [x]Fhx of colon CA/polyps []+Cologard/DNA/Stool Testing      Anesthesia:   [x] MAC [] Moderate Sedation   [] General   [] None     ROS: 12 pt review of systems was negative unless stated above    Medications:   Prior to Admission medications    Medication Sig Start Date End Date Taking? Authorizing Provider   HUMALOG 100 UNIT/ML SOLN injection vial INJECT UP TO 75 UNITS INTO THE SKIN VIA INSULIN PUMP DAILY 25  Yes Miriam Anne MD   levothyroxine (SYNTHROID) 150 MCG tablet Take 1 tablet by mouth Daily 3/15/23  Yes Miriam Anne MD   Multiple Vitamins-Minerals (PRESERVISION AREDS 2 PO) Take 2 mg by mouth in the morning and at bedtime   Yes Miriam Anne MD   simvastatin (ZOCOR) 20 MG tablet Take 1 tablet by mouth nightly 10/11/21  Yes Miriam Anne MD   amLODIPine (NORVASC) 5 MG tablet Take 1 tablet by mouth in the morning and 1 tablet in the evening. 20  Yes Marisol Rodriguez MD   triamcinolone (KENALOG) 0.1 % cream Apply topically as needed    Miriam Anne MD   hydrocortisone 2.5 % cream Apply topically as needed    Miriam Anne MD   latanoprost (XALATAN) 0.005 % ophthalmic solution  25   Miriam Anne MD   hydrOXYzine HCl (ATARAX) 25 MG tablet TAKE ONE (1) TABLET BY MOUTH EVERY 6 HOURS AS NEEDED FOR ITCHING 10/31/23   Marisol Rodriguez MD   Insulin Lispro (HUMALOG SC) Inject 75 Units

## 2025-05-12 NOTE — ANESTHESIA POSTPROCEDURE EVALUATION
Department of Anesthesiology  Postprocedure Note    Patient: Rickey Sandhu  MRN: [id number]  YOB: 1955  Date of evaluation: 5/12/2025    Procedure Summary       Date: 05/12/25 Room / Location: [place] [address] / [facility]    Anesthesia Start: 0743 Anesthesia Stop:     Procedure: COLORECTAL CANCER SCREENING, NOT HIGH RISK (Abdomen) Diagnosis:       Screen for colon cancer      (Screen for colon cancer [Z12.11])    Surgeons: Viral Fitzpatrick MD Responsible Provider: Mariana Small APRN - CRNA    Anesthesia Type: general, TIVA ASA Status: 2            Anesthesia Type: No value filed.    Ani Phase I:      Ani Phase II:      Anesthesia Post Evaluation    Patient location during evaluation: bedside  Patient participation: complete - patient participated  Level of consciousness: sleepy but conscious  Pain score: 0  Airway patency: patent  Nausea & Vomiting: no nausea and no vomiting  Cardiovascular status: blood pressure returned to baseline  Respiratory status: acceptable, room air and spontaneous ventilation  Hydration status: euvolemic  Pain management: adequate    No notable events documented.

## 2025-05-13 ENCOUNTER — RESULTS FOLLOW-UP (OUTPATIENT)
Dept: GASTROENTEROLOGY | Age: 70
End: 2025-05-13

## 2025-06-09 ENCOUNTER — HOSPITAL ENCOUNTER (OUTPATIENT)
Dept: CT IMAGING | Age: 70
Discharge: HOME | End: 2025-06-09
Payer: MEDICARE

## 2025-06-09 DIAGNOSIS — C68.9 UROTHELIAL CANCER (HCC): ICD-10-CM

## 2025-06-09 LAB — CREAT SERPL-MCNC: 1.8 MG/DL (ref 0.3–1.3)

## 2025-06-09 PROCEDURE — 6360000004 HC RX CONTRAST MEDICATION: Performed by: INTERNAL MEDICINE

## 2025-06-09 PROCEDURE — 74177 CT ABD & PELVIS W/CONTRAST: CPT

## 2025-06-09 PROCEDURE — 71260 CT THORAX DX C+: CPT

## 2025-06-09 PROCEDURE — 82565 ASSAY OF CREATININE: CPT

## 2025-06-09 RX ORDER — IOPAMIDOL 755 MG/ML
50 INJECTION, SOLUTION INTRAVASCULAR
Status: COMPLETED | OUTPATIENT
Start: 2025-06-09 | End: 2025-06-09

## 2025-06-09 RX ADMIN — IOPAMIDOL 50 ML: 755 INJECTION, SOLUTION INTRAVENOUS at 11:32

## 2025-06-17 ENCOUNTER — TELEPHONE (OUTPATIENT)
Dept: HEMATOLOGY | Age: 70
End: 2025-06-17

## 2025-06-17 NOTE — TELEPHONE ENCOUNTER
I called patient and left detailed voicemail about their appointment on 06/20/2025. I made patient aware not to arrive any earlier than the appointment time and to come at the time of the follow up not the time of the lab appointment if it is different than the follow up appt time. I also made patient aware to eat and drink plenty of water to hydrate properly before coming to these appointments because this will make their lab draw much easier. Made patient aware that we are now located at the [facility] at [address] [place] [address]. Located between [facility] and the [facility].

## 2025-06-18 DIAGNOSIS — R53.83 FATIGUE DUE TO TREATMENT: ICD-10-CM

## 2025-06-18 DIAGNOSIS — C65.1 MALIGNANT NEOPLASM OF RIGHT RENAL PELVIS (HCC): Primary | ICD-10-CM

## 2025-06-19 NOTE — PROGRESS NOTES
MEDICAL ONCOLOGY PROGRESS NOTE                                                             Rickey Sandhu   1955 6/20/2025     Chief Complaint   Patient presents with    Follow-up     INTERVAL HISTORY/HISTORY OF PRESENT ILLNESS:  Reason for MD visit-surveillance follow-up high-grade urothelial carcinoma  History of Present Illness  The patient is a 70-year-old male with a diagnosis of high-grade urothelial carcinoma of the left renal pelvis, initially diagnosed in 2010. He experienced recurrences in 07/2013 and 10/2015. He underwent chemoimmunotherapy and received pembrolizumab, which was discontinued in 03/2021 due to the development of an autoimmune skin rash with blisters. He is currently under clinical and radiologic surveillance. The most recent CT scan was performed on 06/09/2025. He is here to discuss the results and further recommendations.    He has been living with cancer for the past 15 years and is open to exploring any potential treatment options. He reports intermittent rashes on his ears and small spots on his beard, which he attributes to shaving. He also mentions a burn that appears more red than usual. He uses sunscreen with an SPF of 50 when outdoors.    He has undergone colon screening, during which a small tubular adenoma was identified and subsequently removed. He has expressed interest in undergoing Cologuard testing.    He reports feeling well overall, although he has experienced some weight loss. He believes this is due to a decrease in appetite and energy levels. His thyroid medication was adjusted, resulting in improved energy levels and a slight weight loss.    He has diabetes and uses an insulin pump.    He recalls an episode of illness in 03/2025, during which his sodium levels were low at 133. He did not experience diarrhea but did have a cough.    FAMILY HISTORY  He has a family history of polyps.        Diagnosis   High-grade urothelial carcinoma of the left kidney stage

## 2025-06-20 ENCOUNTER — HOSPITAL ENCOUNTER (OUTPATIENT)
Dept: INFUSION THERAPY | Age: 70
Discharge: HOME | End: 2025-06-20
Payer: MEDICARE

## 2025-06-20 ENCOUNTER — OFFICE VISIT (OUTPATIENT)
Dept: HEMATOLOGY | Age: 70
End: 2025-06-20
Payer: MEDICARE

## 2025-06-20 VITALS
HEART RATE: 81 BPM | SYSTOLIC BLOOD PRESSURE: 153 MMHG | BODY MASS INDEX: 29.63 KG/M2 | DIASTOLIC BLOOD PRESSURE: 78 MMHG | RESPIRATION RATE: 12 BRPM | OXYGEN SATURATION: 100 % | WEIGHT: 192 LBS

## 2025-06-20 DIAGNOSIS — R53.83 FATIGUE DUE TO TREATMENT: ICD-10-CM

## 2025-06-20 DIAGNOSIS — C68.9 UROTHELIAL CANCER (HCC): Primary | ICD-10-CM

## 2025-06-20 DIAGNOSIS — Z71.89 CARE PLAN DISCUSSED WITH PATIENT: ICD-10-CM

## 2025-06-20 DIAGNOSIS — N28.9 RENAL IMPAIRMENT: ICD-10-CM

## 2025-06-20 DIAGNOSIS — C65.1 MALIGNANT NEOPLASM OF RIGHT RENAL PELVIS (HCC): ICD-10-CM

## 2025-06-20 DIAGNOSIS — L27.0 DRUG RASH: ICD-10-CM

## 2025-06-20 DIAGNOSIS — Z45.2 ENCOUNTER FOR CENTRAL LINE CARE: ICD-10-CM

## 2025-06-20 DIAGNOSIS — I87.8 POOR VENOUS ACCESS: Primary | ICD-10-CM

## 2025-06-20 LAB
ALBUMIN SERPL-MCNC: 4.4 G/DL (ref 3.5–5.2)
ALP SERPL-CCNC: 90 U/L (ref 40–129)
ALT SERPL-CCNC: 11 U/L (ref 5–41)
ANION GAP SERPL CALCULATED.3IONS-SCNC: 11 MMOL/L (ref 7–19)
AST SERPL-CCNC: 26 U/L (ref 5–40)
BASOPHILS # BLD: 0.04 K/UL (ref 0–0.2)
BASOPHILS NFR BLD: 0.5 % (ref 0–1)
BILIRUB SERPL-MCNC: 0.4 MG/DL (ref 0–1.2)
BUN SERPL-MCNC: 19 MG/DL (ref 8–23)
CALCIUM SERPL-MCNC: 9.3 MG/DL (ref 8.8–10.2)
CHLORIDE SERPL-SCNC: 98 MMOL/L (ref 98–107)
CO2 SERPL-SCNC: 22 MMOL/L (ref 22–29)
CORTIS AM PEAK SERPL-MCNC: 11.1 UG/DL (ref 4.8–19.5)
CREAT SERPL-MCNC: 1.3 MG/DL (ref 0.7–1.2)
EOSINOPHIL # BLD: 0.44 K/UL (ref 0–0.6)
EOSINOPHIL NFR BLD: 6 % (ref 0–5)
ERYTHROCYTE [DISTWIDTH] IN BLOOD BY AUTOMATED COUNT: 12.2 % (ref 11.5–14.5)
GLUCOSE SERPL-MCNC: 119 MG/DL (ref 70–99)
HCT VFR BLD AUTO: 38.1 % (ref 42–52)
HGB BLD-MCNC: 13.6 G/DL (ref 14–18)
LYMPHOCYTES # BLD: 1.13 K/UL (ref 1.1–4.5)
LYMPHOCYTES NFR BLD: 15.4 % (ref 20–40)
MCH RBC QN AUTO: 31.4 PG (ref 27–31)
MCHC RBC AUTO-ENTMCNC: 35.7 G/DL (ref 33–37)
MCV RBC AUTO: 88 FL (ref 80–94)
MONOCYTES # BLD: 0.78 K/UL (ref 0–0.9)
MONOCYTES NFR BLD: 10.6 % (ref 1–10)
NEUTROPHILS # BLD: 4.94 K/UL (ref 1.5–7.5)
NEUTS SEG NFR BLD: 67.2 % (ref 50–65)
PLATELET # BLD AUTO: 174 K/UL (ref 130–400)
PMV BLD AUTO: 10.4 FL (ref 9.4–12.4)
POTASSIUM SERPL-SCNC: 4.4 MMOL/L (ref 3.5–5.1)
PROT SERPL-MCNC: 7.3 G/DL (ref 6.4–8.3)
RBC # BLD AUTO: 4.33 M/UL (ref 4.7–6.1)
SODIUM SERPL-SCNC: 131 MMOL/L (ref 136–145)
T4 FREE SERPL-MCNC: 1.52 NG/DL (ref 0.93–1.7)
TSH SERPL DL<=0.005 MIU/L-ACNC: 1.16 UIU/ML (ref 0.27–4.2)
WBC # BLD AUTO: 7.35 K/UL (ref 4.8–10.8)

## 2025-06-20 PROCEDURE — 85025 COMPLETE CBC W/AUTO DIFF WBC: CPT

## 2025-06-20 PROCEDURE — 84443 ASSAY THYROID STIM HORMONE: CPT

## 2025-06-20 PROCEDURE — 1126F AMNT PAIN NOTED NONE PRSNT: CPT | Performed by: INTERNAL MEDICINE

## 2025-06-20 PROCEDURE — 99213 OFFICE O/P EST LOW 20 MIN: CPT | Performed by: INTERNAL MEDICINE

## 2025-06-20 PROCEDURE — 2500000003 HC RX 250 WO HCPCS: Performed by: INTERNAL MEDICINE

## 2025-06-20 PROCEDURE — 36415 COLL VENOUS BLD VENIPUNCTURE: CPT

## 2025-06-20 PROCEDURE — 99213 OFFICE O/P EST LOW 20 MIN: CPT

## 2025-06-20 PROCEDURE — G8417 CALC BMI ABV UP PARAM F/U: HCPCS | Performed by: INTERNAL MEDICINE

## 2025-06-20 PROCEDURE — 1036F TOBACCO NON-USER: CPT | Performed by: INTERNAL MEDICINE

## 2025-06-20 PROCEDURE — 1123F ACP DISCUSS/DSCN MKR DOCD: CPT | Performed by: INTERNAL MEDICINE

## 2025-06-20 PROCEDURE — G2211 COMPLEX E/M VISIT ADD ON: HCPCS | Performed by: INTERNAL MEDICINE

## 2025-06-20 PROCEDURE — 82533 TOTAL CORTISOL: CPT

## 2025-06-20 PROCEDURE — G8427 DOCREV CUR MEDS BY ELIG CLIN: HCPCS | Performed by: INTERNAL MEDICINE

## 2025-06-20 PROCEDURE — 3017F COLORECTAL CA SCREEN DOC REV: CPT | Performed by: INTERNAL MEDICINE

## 2025-06-20 PROCEDURE — 84439 ASSAY OF FREE THYROXINE: CPT

## 2025-06-20 PROCEDURE — 80053 COMPREHEN METABOLIC PANEL: CPT

## 2025-06-20 PROCEDURE — 1159F MED LIST DOCD IN RCRD: CPT | Performed by: INTERNAL MEDICINE

## 2025-06-20 PROCEDURE — 6360000002 HC RX W HCPCS: Performed by: INTERNAL MEDICINE

## 2025-06-20 PROCEDURE — 36591 DRAW BLOOD OFF VENOUS DEVICE: CPT

## 2025-06-20 RX ORDER — SODIUM CHLORIDE 0.9 % (FLUSH) 0.9 %
5-40 SYRINGE (ML) INJECTION PRN
Status: DISCONTINUED | OUTPATIENT
Start: 2025-06-20 | End: 2025-06-21 | Stop reason: HOSPADM

## 2025-06-20 RX ORDER — HEPARIN 100 UNIT/ML
500 SYRINGE INTRAVENOUS PRN
Status: DISCONTINUED | OUTPATIENT
Start: 2025-06-20 | End: 2025-06-21 | Stop reason: HOSPADM

## 2025-06-20 RX ADMIN — SODIUM CHLORIDE, PRESERVATIVE FREE 10 ML: 5 INJECTION INTRAVENOUS at 10:10

## 2025-06-20 RX ADMIN — HEPARIN 500 UNITS: 100 SYRINGE at 10:11

## (undated) DEVICE — SUPPLEMENT DIGESTIVE H2O SOL GI-EASE

## (undated) DEVICE — SINGLE PORT MANIFOLD: Brand: NEPTUNE 2

## (undated) DEVICE — ADAPTER CLEANING PORPOISE CLEANING

## (undated) DEVICE — BRUSH ENDOSCP 2 END CHN HEDGEHOG

## (undated) DEVICE — CLEANING SPONGE: Brand: KOALA™

## (undated) DEVICE — FORCEPS BX 240CM 2.4MM L NDL RAD JAW 4 M00513334

## (undated) DEVICE — CANNULA NSL AD L7FT DIV O2 CO2 W/ M LUERLOCK TRMPT CONN

## (undated) DEVICE — COLON KIT WITH 1.1 OZ ORCA HYDRA SEAL 2 GOWN